# Patient Record
Sex: MALE | Race: WHITE | NOT HISPANIC OR LATINO
[De-identification: names, ages, dates, MRNs, and addresses within clinical notes are randomized per-mention and may not be internally consistent; named-entity substitution may affect disease eponyms.]

---

## 2017-01-23 ENCOUNTER — NON-APPOINTMENT (OUTPATIENT)
Age: 67
End: 2017-01-23

## 2017-01-23 ENCOUNTER — APPOINTMENT (OUTPATIENT)
Dept: CARDIOLOGY | Facility: CLINIC | Age: 67
End: 2017-01-23

## 2017-01-23 VITALS
SYSTOLIC BLOOD PRESSURE: 164 MMHG | WEIGHT: 225 LBS | BODY MASS INDEX: 31.5 KG/M2 | HEART RATE: 71 BPM | DIASTOLIC BLOOD PRESSURE: 93 MMHG | OXYGEN SATURATION: 97 % | HEIGHT: 71 IN

## 2017-01-24 ENCOUNTER — TRANSCRIPTION ENCOUNTER (OUTPATIENT)
Age: 67
End: 2017-01-24

## 2017-02-03 ENCOUNTER — APPOINTMENT (OUTPATIENT)
Dept: CARDIOLOGY | Facility: CLINIC | Age: 67
End: 2017-02-03

## 2017-02-03 VITALS
BODY MASS INDEX: 31.5 KG/M2 | DIASTOLIC BLOOD PRESSURE: 83 MMHG | OXYGEN SATURATION: 97 % | HEIGHT: 71 IN | HEART RATE: 77 BPM | WEIGHT: 225 LBS | SYSTOLIC BLOOD PRESSURE: 128 MMHG

## 2017-02-03 DIAGNOSIS — I35.1 NONRHEUMATIC AORTIC (VALVE) INSUFFICIENCY: ICD-10-CM

## 2017-02-03 RX ORDER — CALCIUM CARBONATE 260MG(650)
500 TABLET,CHEWABLE ORAL DAILY
Refills: 0 | Status: DISCONTINUED | COMMUNITY
End: 2017-02-03

## 2017-02-07 ENCOUNTER — FORM ENCOUNTER (OUTPATIENT)
Age: 67
End: 2017-02-07

## 2017-02-08 ENCOUNTER — OUTPATIENT (OUTPATIENT)
Dept: OUTPATIENT SERVICES | Facility: HOSPITAL | Age: 67
LOS: 1 days | End: 2017-02-08
Payer: MEDICARE

## 2017-02-08 ENCOUNTER — APPOINTMENT (OUTPATIENT)
Dept: CT IMAGING | Facility: CLINIC | Age: 67
End: 2017-02-08

## 2017-02-08 DIAGNOSIS — Z00.00 ENCOUNTER FOR GENERAL ADULT MEDICAL EXAMINATION WITHOUT ABNORMAL FINDINGS: ICD-10-CM

## 2017-02-08 DIAGNOSIS — Z98.89 OTHER SPECIFIED POSTPROCEDURAL STATES: Chronic | ICD-10-CM

## 2017-02-08 PROCEDURE — 82565 ASSAY OF CREATININE: CPT

## 2017-02-08 PROCEDURE — 71275 CT ANGIOGRAPHY CHEST: CPT

## 2017-02-24 ENCOUNTER — MEDICATION RENEWAL (OUTPATIENT)
Age: 67
End: 2017-02-24

## 2017-03-08 ENCOUNTER — APPOINTMENT (OUTPATIENT)
Dept: NEPHROLOGY | Facility: CLINIC | Age: 67
End: 2017-03-08

## 2017-03-08 VITALS
SYSTOLIC BLOOD PRESSURE: 150 MMHG | DIASTOLIC BLOOD PRESSURE: 86 MMHG | BODY MASS INDEX: 30.1 KG/M2 | HEIGHT: 71 IN | HEART RATE: 70 BPM | WEIGHT: 215 LBS

## 2017-03-08 RX ORDER — CEPHALEXIN 500 MG/1
500 CAPSULE ORAL
Qty: 12 | Refills: 0 | Status: DISCONTINUED | COMMUNITY
Start: 2017-01-25

## 2017-03-08 RX ORDER — OXYCODONE AND ACETAMINOPHEN 7.5; 325 MG/1; MG/1
7.5-325 TABLET ORAL
Qty: 25 | Refills: 0 | Status: DISCONTINUED | COMMUNITY
Start: 2017-01-25

## 2017-03-08 RX ORDER — HYDROCODONE BITARTRATE AND ACETAMINOPHEN 5; 325 MG/1; MG/1
5-325 TABLET ORAL
Qty: 20 | Refills: 0 | Status: DISCONTINUED | COMMUNITY
Start: 2017-01-16

## 2017-03-10 LAB
APPEARANCE: CLEAR
BACTERIA: NEGATIVE
BILIRUBIN URINE: NEGATIVE
BLOOD URINE: NEGATIVE
COLOR: YELLOW
CREAT SPEC-SCNC: 35 MG/DL
GLUCOSE QUALITATIVE U: 1000 MG/DL
KETONES URINE: NEGATIVE
LEUKOCYTE ESTERASE URINE: NEGATIVE
MICROALBUMIN 24H UR DL<=1MG/L-MCNC: 1.7 MG/DL
MICROALBUMIN/CREAT 24H UR-RTO: 49 UG/MG
MICROSCOPIC-UA: NORMAL
NITRITE URINE: NEGATIVE
PH URINE: 6.5
PROTEIN URINE: NEGATIVE MG/DL
RED BLOOD CELLS URINE: 1 /HPF
SPECIFIC GRAVITY URINE: 1.03
SQUAMOUS EPITHELIAL CELLS: 0 /HPF
UROBILINOGEN URINE: NORMAL MG/DL
WHITE BLOOD CELLS URINE: 1 /HPF

## 2017-03-15 DIAGNOSIS — N28.1 CYST OF KIDNEY, ACQUIRED: ICD-10-CM

## 2017-06-12 ENCOUNTER — NON-APPOINTMENT (OUTPATIENT)
Age: 67
End: 2017-06-12

## 2017-06-12 ENCOUNTER — APPOINTMENT (OUTPATIENT)
Dept: CARDIOLOGY | Facility: CLINIC | Age: 67
End: 2017-06-12

## 2017-06-12 VITALS
OXYGEN SATURATION: 94 % | HEART RATE: 75 BPM | HEIGHT: 71 IN | SYSTOLIC BLOOD PRESSURE: 142 MMHG | DIASTOLIC BLOOD PRESSURE: 82 MMHG | WEIGHT: 226 LBS | BODY MASS INDEX: 31.64 KG/M2

## 2017-06-16 LAB
24R-OH-CALCIDIOL SERPL-MCNC: 58.9 PG/ML
ALBUMIN SERPL ELPH-MCNC: 4.6 G/DL
ALP BLD-CCNC: 62 U/L
ALT SERPL-CCNC: 29 U/L
ANION GAP SERPL CALC-SCNC: 18 MMOL/L
AST SERPL-CCNC: 22 U/L
BASOPHILS # BLD AUTO: 0.05 K/UL
BASOPHILS NFR BLD AUTO: 0.8 %
BILIRUB SERPL-MCNC: 0.6 MG/DL
BUN SERPL-MCNC: 16 MG/DL
CALCIUM SERPL-MCNC: 9.7 MG/DL
CHLORIDE SERPL-SCNC: 102 MMOL/L
CHOLEST SERPL-MCNC: 184 MG/DL
CHOLEST/HDLC SERPL: 3.5 RATIO
CO2 SERPL-SCNC: 21 MMOL/L
CREAT SERPL-MCNC: 0.81 MG/DL
EOSINOPHIL # BLD AUTO: 0.2 K/UL
EOSINOPHIL NFR BLD AUTO: 3 %
GLUCOSE SERPL-MCNC: 133 MG/DL
HBA1C MFR BLD HPLC: 7.1 %
HCT VFR BLD CALC: 47.1 %
HDLC SERPL-MCNC: 52 MG/DL
HGB BLD-MCNC: 15.3 G/DL
IMM GRANULOCYTES NFR BLD AUTO: 0.3 %
LDLC SERPL CALC-MCNC: 115 MG/DL
LYMPHOCYTES # BLD AUTO: 2.36 K/UL
LYMPHOCYTES NFR BLD AUTO: 35.9 %
MAN DIFF?: NORMAL
MCHC RBC-ENTMCNC: 30.8 PG
MCHC RBC-ENTMCNC: 32.5 GM/DL
MCV RBC AUTO: 95 FL
MONOCYTES # BLD AUTO: 0.69 K/UL
MONOCYTES NFR BLD AUTO: 10.5 %
NEUTROPHILS # BLD AUTO: 3.25 K/UL
NEUTROPHILS NFR BLD AUTO: 49.5 %
PLATELET # BLD AUTO: 305 K/UL
POTASSIUM SERPL-SCNC: 4.6 MMOL/L
PROT SERPL-MCNC: 7.6 G/DL
RBC # BLD: 4.96 M/UL
RBC # FLD: 13.9 %
SODIUM SERPL-SCNC: 141 MMOL/L
TRIGL SERPL-MCNC: 85 MG/DL
TSH SERPL-ACNC: 1.55 UIU/ML
WBC # FLD AUTO: 6.57 K/UL

## 2017-06-19 ENCOUNTER — MEDICATION RENEWAL (OUTPATIENT)
Age: 67
End: 2017-06-19

## 2017-06-19 RX ORDER — CARVEDILOL 6.25 MG/1
6.25 TABLET, FILM COATED ORAL
Qty: 180 | Refills: 3 | Status: DISCONTINUED | COMMUNITY
End: 2017-06-19

## 2017-07-03 ENCOUNTER — MEDICATION RENEWAL (OUTPATIENT)
Age: 67
End: 2017-07-03

## 2017-07-03 DIAGNOSIS — R07.89 OTHER CHEST PAIN: ICD-10-CM

## 2017-07-07 ENCOUNTER — APPOINTMENT (OUTPATIENT)
Dept: CARDIOLOGY | Facility: CLINIC | Age: 67
End: 2017-07-07

## 2017-07-13 ENCOUNTER — APPOINTMENT (OUTPATIENT)
Dept: ELECTROPHYSIOLOGY | Facility: CLINIC | Age: 67
End: 2017-07-13

## 2017-07-13 VITALS — HEART RATE: 80 BPM | OXYGEN SATURATION: 96 % | DIASTOLIC BLOOD PRESSURE: 90 MMHG | SYSTOLIC BLOOD PRESSURE: 127 MMHG

## 2017-07-17 ENCOUNTER — NON-APPOINTMENT (OUTPATIENT)
Age: 67
End: 2017-07-17

## 2017-07-17 ENCOUNTER — APPOINTMENT (OUTPATIENT)
Dept: CARDIOLOGY | Facility: CLINIC | Age: 67
End: 2017-07-17

## 2017-07-17 VITALS
SYSTOLIC BLOOD PRESSURE: 131 MMHG | OXYGEN SATURATION: 95 % | DIASTOLIC BLOOD PRESSURE: 81 MMHG | BODY MASS INDEX: 43.05 KG/M2 | HEIGHT: 61 IN | HEART RATE: 81 BPM | WEIGHT: 228 LBS

## 2017-08-22 ENCOUNTER — OUTPATIENT (OUTPATIENT)
Dept: OUTPATIENT SERVICES | Facility: HOSPITAL | Age: 67
LOS: 1 days | Discharge: HOME | End: 2017-08-22
Payer: MEDICARE

## 2017-08-22 VITALS
DIASTOLIC BLOOD PRESSURE: 79 MMHG | OXYGEN SATURATION: 98 % | HEART RATE: 79 BPM | HEIGHT: 71 IN | TEMPERATURE: 98 F | SYSTOLIC BLOOD PRESSURE: 134 MMHG | WEIGHT: 237 LBS | RESPIRATION RATE: 20 BRPM

## 2017-08-22 DIAGNOSIS — Z98.890 OTHER SPECIFIED POSTPROCEDURAL STATES: Chronic | ICD-10-CM

## 2017-08-22 DIAGNOSIS — I47.1 SUPRAVENTRICULAR TACHYCARDIA: ICD-10-CM

## 2017-08-22 DIAGNOSIS — Z98.89 OTHER SPECIFIED POSTPROCEDURAL STATES: Chronic | ICD-10-CM

## 2017-08-22 DIAGNOSIS — Z01.818 ENCOUNTER FOR OTHER PREPROCEDURAL EXAMINATION: ICD-10-CM

## 2017-08-22 LAB
ANION GAP SERPL CALC-SCNC: 8 MMOL/L — SIGNIFICANT CHANGE UP (ref 5–17)
BASOPHILS # BLD AUTO: 0.1 K/UL — SIGNIFICANT CHANGE UP (ref 0–0.2)
BASOPHILS NFR BLD AUTO: 1.7 % — SIGNIFICANT CHANGE UP (ref 0–2)
BUN SERPL-MCNC: 13 MG/DL — SIGNIFICANT CHANGE UP (ref 7–23)
CALCIUM SERPL-MCNC: 9.4 MG/DL — SIGNIFICANT CHANGE UP (ref 8.5–10.1)
CHLORIDE SERPL-SCNC: 107 MMOL/L — SIGNIFICANT CHANGE UP (ref 96–108)
CO2 SERPL-SCNC: 27 MMOL/L — SIGNIFICANT CHANGE UP (ref 22–31)
CREAT SERPL-MCNC: 0.83 MG/DL — SIGNIFICANT CHANGE UP (ref 0.5–1.3)
EOSINOPHIL # BLD AUTO: 0.3 K/UL — SIGNIFICANT CHANGE UP (ref 0–0.5)
EOSINOPHIL NFR BLD AUTO: 4.7 % — SIGNIFICANT CHANGE UP (ref 0–6)
GLUCOSE SERPL-MCNC: 135 MG/DL — HIGH (ref 70–99)
HCT VFR BLD CALC: 46.9 % — SIGNIFICANT CHANGE UP (ref 39–50)
HGB BLD-MCNC: 16.3 G/DL — SIGNIFICANT CHANGE UP (ref 13–17)
LYMPHOCYTES # BLD AUTO: 2.3 K/UL — SIGNIFICANT CHANGE UP (ref 1–3.3)
LYMPHOCYTES # BLD AUTO: 31.8 % — SIGNIFICANT CHANGE UP (ref 13–44)
MCHC RBC-ENTMCNC: 32.5 PG — SIGNIFICANT CHANGE UP (ref 27–34)
MCHC RBC-ENTMCNC: 34.8 GM/DL — SIGNIFICANT CHANGE UP (ref 32–36)
MCV RBC AUTO: 93.4 FL — SIGNIFICANT CHANGE UP (ref 80–100)
MONOCYTES # BLD AUTO: 0.7 K/UL — SIGNIFICANT CHANGE UP (ref 0–0.9)
MONOCYTES NFR BLD AUTO: 9.5 % — SIGNIFICANT CHANGE UP (ref 2–14)
NEUTROPHILS # BLD AUTO: 3.8 K/UL — SIGNIFICANT CHANGE UP (ref 1.8–7.4)
NEUTROPHILS NFR BLD AUTO: 52.3 % — SIGNIFICANT CHANGE UP (ref 43–77)
PLATELET # BLD AUTO: 273 K/UL — SIGNIFICANT CHANGE UP (ref 150–400)
POTASSIUM SERPL-MCNC: 5.1 MMOL/L — SIGNIFICANT CHANGE UP (ref 3.5–5.3)
POTASSIUM SERPL-SCNC: 5.1 MMOL/L — SIGNIFICANT CHANGE UP (ref 3.5–5.3)
RBC # BLD: 5.02 M/UL — SIGNIFICANT CHANGE UP (ref 4.2–5.8)
RBC # FLD: 11.6 % — SIGNIFICANT CHANGE UP (ref 10.3–14.5)
SODIUM SERPL-SCNC: 142 MMOL/L — SIGNIFICANT CHANGE UP (ref 135–145)
WBC # BLD: 7.3 K/UL — SIGNIFICANT CHANGE UP (ref 3.8–10.5)
WBC # FLD AUTO: 7.3 K/UL — SIGNIFICANT CHANGE UP (ref 3.8–10.5)

## 2017-08-22 PROCEDURE — 93010 ELECTROCARDIOGRAM REPORT: CPT

## 2017-08-22 NOTE — H&P PST ADULT - ASSESSMENT
66 y/o male with SVT. Complain of lightheadedness and syncope. Scheduled for EPS with possible ablation with Dr. Lugo.    Plan  1. NPO at midnight.  2. Follow preop medication instructions from EP MD/NP  3. Use sponges as directed

## 2017-08-22 NOTE — H&P PST ADULT - PSH
H/O thyroidectomy  partial  History of knee replacement, total  2000, 2003  History of tonsillectomy  23 y/o  S/P wrist surgery  ORIF - 02/2017

## 2017-08-22 NOTE — H&P PST ADULT - PMH
BRBPR (bright red blood per rectum)    Colonic polyp    Diabetes mellitus    GI bleed    Hemorrhoids    HTN (hypertension)    Hypercholesteremia    OA (osteoarthritis)    Obesity    SVT (supraventricular tachycardia)

## 2017-08-22 NOTE — H&P PST ADULT - HISTORY OF PRESENT ILLNESS
66 y/o male with SVT. Complain of lightheadedness and syncope. Denies chest pain. Here today for PST for EPS with possible ablation.

## 2017-08-22 NOTE — H&P PST ADULT - TEMPERATURE IN CELSIUS (DEGREES C)
Appointments scheduled.  
Called pt left message to call office to schedule appointment.  
Called pt left message to call office to schedule appointments.  
Needs labs, mmg, dexa and physical scheduled for June.  
36.6

## 2017-08-22 NOTE — H&P PST ADULT - NSANTHTOTALSCORECAL_ENT_A_CORE
Spoke with Abdirizak Yin (cycle rep who rescheduled CT per patient request). Explained that ADALGISA Cedeño did not place new order for patient to have CT done. That his insurance did not find it medically necessary. An US was ordered instead and patient has since has this testing done. Cycle rep has spoken to patient and informed him of current situation. Understanding verbalized. CT canceled.    3

## 2017-08-22 NOTE — H&P PST ADULT - NSANTHOSAYNRD_GEN_A_CORE
No. SHELIA screening performed.  STOP BANG Legend: 0-2 = LOW Risk; 3-4 = INTERMEDIATE Risk; 5-8 = HIGH Risk

## 2017-08-24 ENCOUNTER — MEDICATION RENEWAL (OUTPATIENT)
Age: 67
End: 2017-08-24

## 2017-08-24 NOTE — ASU PATIENT PROFILE, ADULT - VISION (WITH CORRECTIVE LENSES IF THE PATIENT USUALLY WEARS THEM):
wears contacts/Partially impaired: cannot see medication labels or newsprint, but can see obstacles in path, and the surrounding layout; can count fingers at arm's length

## 2017-08-24 NOTE — ASU PATIENT PROFILE, ADULT - MEDICATIONS TO HOLD
Per NP instructions, pt to hold metoprolol 2 days prior to procedure; pt to hold diabetic medication in a.m. of procedure

## 2017-08-25 ENCOUNTER — MEDICATION RENEWAL (OUTPATIENT)
Age: 67
End: 2017-08-25

## 2017-08-28 ENCOUNTER — OUTPATIENT (OUTPATIENT)
Dept: OUTPATIENT SERVICES | Facility: HOSPITAL | Age: 67
LOS: 1 days | Discharge: HOME | End: 2017-08-28
Payer: MEDICARE

## 2017-08-28 VITALS
RESPIRATION RATE: 18 BRPM | SYSTOLIC BLOOD PRESSURE: 111 MMHG | HEART RATE: 70 BPM | DIASTOLIC BLOOD PRESSURE: 69 MMHG | WEIGHT: 229.94 LBS | HEIGHT: 71 IN | TEMPERATURE: 97 F | OXYGEN SATURATION: 98 %

## 2017-08-28 DIAGNOSIS — I47.1 SUPRAVENTRICULAR TACHYCARDIA: ICD-10-CM

## 2017-08-28 DIAGNOSIS — I77.819 AORTIC ECTASIA, UNSPECIFIED SITE: ICD-10-CM

## 2017-08-28 DIAGNOSIS — Z87.891 PERSONAL HISTORY OF NICOTINE DEPENDENCE: ICD-10-CM

## 2017-08-28 DIAGNOSIS — Z98.890 OTHER SPECIFIED POSTPROCEDURAL STATES: Chronic | ICD-10-CM

## 2017-08-28 DIAGNOSIS — N40.0 BENIGN PROSTATIC HYPERPLASIA WITHOUT LOWER URINARY TRACT SYMPTOMS: ICD-10-CM

## 2017-08-28 DIAGNOSIS — Z98.89 OTHER SPECIFIED POSTPROCEDURAL STATES: Chronic | ICD-10-CM

## 2017-08-28 DIAGNOSIS — E78.5 HYPERLIPIDEMIA, UNSPECIFIED: ICD-10-CM

## 2017-08-28 DIAGNOSIS — N28.1 CYST OF KIDNEY, ACQUIRED: ICD-10-CM

## 2017-08-28 DIAGNOSIS — I35.1 NONRHEUMATIC AORTIC (VALVE) INSUFFICIENCY: ICD-10-CM

## 2017-08-28 DIAGNOSIS — Z79.82 LONG TERM (CURRENT) USE OF ASPIRIN: ICD-10-CM

## 2017-08-28 DIAGNOSIS — E11.9 TYPE 2 DIABETES MELLITUS WITHOUT COMPLICATIONS: ICD-10-CM

## 2017-08-28 DIAGNOSIS — I10 ESSENTIAL (PRIMARY) HYPERTENSION: ICD-10-CM

## 2017-08-28 PROCEDURE — 93620 COMP EP EVL R AT VEN PAC&REC: CPT | Mod: 26

## 2017-08-28 PROCEDURE — 93613 INTRACARDIAC EPHYS 3D MAPG: CPT | Mod: 59

## 2017-08-28 RX ORDER — METOPROLOL TARTRATE 50 MG
50 TABLET ORAL DAILY
Qty: 0 | Refills: 0 | Status: DISCONTINUED | OUTPATIENT
Start: 2017-08-28 | End: 2017-08-28

## 2017-08-28 RX ORDER — ASPIRIN/CALCIUM CARB/MAGNESIUM 324 MG
81 TABLET ORAL DAILY
Qty: 0 | Refills: 0 | Status: DISCONTINUED | OUTPATIENT
Start: 2017-08-28 | End: 2017-08-29

## 2017-08-28 RX ORDER — ASPIRIN/CALCIUM CARB/MAGNESIUM 324 MG
81 TABLET ORAL DAILY
Qty: 0 | Refills: 0 | Status: DISCONTINUED | OUTPATIENT
Start: 2017-08-28 | End: 2017-08-28

## 2017-08-28 RX ORDER — METOPROLOL TARTRATE 50 MG
50 TABLET ORAL
Qty: 0 | Refills: 0 | Status: DISCONTINUED | OUTPATIENT
Start: 2017-08-28 | End: 2017-08-29

## 2017-08-28 RX ORDER — LISINOPRIL 2.5 MG/1
5 TABLET ORAL DAILY
Qty: 0 | Refills: 0 | Status: DISCONTINUED | OUTPATIENT
Start: 2017-08-28 | End: 2017-08-29

## 2017-08-28 RX ORDER — ISOPROTERENOL HYDROCHLORIDE 1 MG/5ML
1 INJECTION, SOLUTION INTRACARDIAC; INTRAMUSCULAR; INTRAVENOUS; SUBCUTANEOUS
Qty: 1 | Refills: 0 | Status: DISCONTINUED | OUTPATIENT
Start: 2017-08-28 | End: 2017-08-29

## 2017-08-28 RX ADMIN — Medication 50 MILLIGRAM(S): at 18:43

## 2017-08-28 RX ADMIN — LISINOPRIL 5 MILLIGRAM(S): 2.5 TABLET ORAL at 17:45

## 2017-08-28 RX ADMIN — Medication 81 MILLIGRAM(S): at 17:45

## 2017-08-29 ENCOUNTER — TRANSCRIPTION ENCOUNTER (OUTPATIENT)
Age: 67
End: 2017-08-29

## 2017-08-29 VITALS — SYSTOLIC BLOOD PRESSURE: 120 MMHG | DIASTOLIC BLOOD PRESSURE: 67 MMHG | HEART RATE: 71 BPM

## 2017-08-29 RX ORDER — RAMIPRIL 5 MG
1 CAPSULE ORAL
Qty: 0 | Refills: 0 | COMMUNITY

## 2017-08-29 RX ORDER — METOPROLOL TARTRATE 50 MG
1 TABLET ORAL
Qty: 0 | Refills: 0 | DISCHARGE
Start: 2017-08-29

## 2017-08-29 RX ORDER — LISINOPRIL 2.5 MG/1
1 TABLET ORAL
Qty: 0 | Refills: 0 | DISCHARGE
Start: 2017-08-29

## 2017-08-29 RX ORDER — METOPROLOL TARTRATE 50 MG
2 TABLET ORAL
Qty: 0 | Refills: 0 | DISCHARGE
Start: 2017-08-29

## 2017-08-29 RX ORDER — METOPROLOL TARTRATE 50 MG
1 TABLET ORAL
Qty: 0 | Refills: 0 | COMMUNITY

## 2017-08-29 RX ADMIN — LISINOPRIL 5 MILLIGRAM(S): 2.5 TABLET ORAL at 09:31

## 2017-08-29 RX ADMIN — Medication 81 MILLIGRAM(S): at 09:32

## 2017-08-29 RX ADMIN — Medication 50 MILLIGRAM(S): at 06:12

## 2017-08-29 NOTE — DISCHARGE NOTE ADULT - MEDICATION SUMMARY - MEDICATIONS TO STOP TAKING
I will STOP taking the medications listed below when I get home from the hospital:    ramipril 2.5 mg oral capsule  -- 1 cap(s) by mouth once a day (at bedtime)

## 2017-08-29 NOTE — DISCHARGE NOTE ADULT - HOSPITAL COURSE
Subjective: 67yMale with h/o SVT, who was non-inducible    EKG:  TELE: SR at 73 BPM    MEDICATIONS  (STANDING):  isoproterenol Infusion 1 MICROgram(s)/Min (15 mL/Hr) IV Continuous <Continuous>  lisinopril 5 milliGRAM(s) Oral daily  aspirin  chewable 81 milliGRAM(s) Oral daily  metoprolol succinate ER 50 milliGRAM(s) Oral two times a day    MEDICATIONS  (PRN):      Allergies    No Known Allergies    Intolerances        Vital Signs Last 24 Hrs  T(C): 36.1 (29 Aug 2017 06:20), Max: 36.6 (28 Aug 2017 17:45)  T(F): 96.9 (29 Aug 2017 06:20), Max: 97.8 (28 Aug 2017 17:45)  HR: 71 (29 Aug 2017 07:00) (62 - 87)  BP: 120/67 (29 Aug 2017 07:00) (94/46 - 144/88)  BP(mean): 100 (29 Aug 2017 06:11) (58 - 100)  RR: 18 (28 Aug 2017 16:30) (16 - 18)  SpO2: 94% (28 Aug 2017 20:30) (65% - 99%)    PHYSICAL EXAMINATION:    GENERAL APPEARANCE:  Pt. is not currently dyspneic, in no distress. Pt. is alert, oriented, and pleasant.  HEENT:  Pupils are normal and react normally. No icterus. Mucous membranes well colored.  NECK:  Supple. No lymphadenopathy. Jugular venous pressure not elevated. Carotids equal.   HEART:   The cardiac impulse has a normal quality. There are no murmurs, rubs or gallops noted  CHEST:  Chest is clear to auscultation. Normal respiratory effort.  ABDOMEN:  Soft and nontender.   EXTREMITIES:  There is no edema, pulses 2 plus.  No hematoma  SKIN:  No rash or significant lesions are noted.    LABS:                    RADIOLOGY & ADDITIONAL TESTS:    ASSESSMENT & PLAN: No Inducible arrhythmia  Medical therapy recommended  Pt to f/u with Dr. Lugo in 2 weeks

## 2017-08-29 NOTE — DISCHARGE NOTE ADULT - MEDICATION SUMMARY - MEDICATIONS TO TAKE
I will START or STAY ON the medications listed below when I get home from the hospital:    Low Dose ASA  -- 81 milligram(s) by mouth   -- Indication: For blood thinner    lisinopril 5 mg oral tablet  -- 1 tab(s) by mouth once a day  -- Indication: For blood pressure    Jentadueto 2.5 mg-1000 mg oral tablet  -- 1 tab(s) by mouth 2 times a day  -- Indication: For diabetic    Jardiance 25 mg oral tablet  -- 1 tab(s) by mouth once a day (in the morning)  -- Indication: For diabetic    metoprolol succinate 50 mg oral tablet, extended release  -- 1 tab(s) by mouth 2 times a day  -- Indication: For heart rate

## 2017-08-29 NOTE — DISCHARGE NOTE ADULT - CARE PLAN
Principal Discharge DX:	SVT (supraventricular tachycardia)  Goal:	Pt will be free from Tachycardia  Instructions for follow-up, activity and diet:	Diabtetic diet

## 2017-08-29 NOTE — DISCHARGE NOTE ADULT - PATIENT PORTAL LINK FT
“You can access the [facility] Patient Portal, offered by [facility], by registering with the following website: [email or web address]”

## 2017-08-30 ENCOUNTER — MEDICATION RENEWAL (OUTPATIENT)
Age: 67
End: 2017-08-30

## 2017-08-30 RX ORDER — METOPROLOL SUCCINATE 50 MG/1
50 TABLET, EXTENDED RELEASE ORAL
Qty: 90 | Refills: 1 | Status: COMPLETED | COMMUNITY
Start: 2017-06-19 | End: 2017-08-30

## 2017-09-21 ENCOUNTER — NON-APPOINTMENT (OUTPATIENT)
Age: 67
End: 2017-09-21

## 2017-09-21 ENCOUNTER — APPOINTMENT (OUTPATIENT)
Dept: ELECTROPHYSIOLOGY | Facility: CLINIC | Age: 67
End: 2017-09-21
Payer: MEDICARE

## 2017-09-21 VITALS
BODY MASS INDEX: 31.92 KG/M2 | WEIGHT: 228 LBS | HEIGHT: 71 IN | DIASTOLIC BLOOD PRESSURE: 94 MMHG | HEART RATE: 75 BPM | SYSTOLIC BLOOD PRESSURE: 137 MMHG

## 2017-09-21 PROCEDURE — 93000 ELECTROCARDIOGRAM COMPLETE: CPT

## 2017-09-21 PROCEDURE — 99215 OFFICE O/P EST HI 40 MIN: CPT

## 2017-09-21 RX ORDER — RAMIPRIL 2.5 MG/1
2.5 CAPSULE ORAL
Qty: 90 | Refills: 1 | Status: DISCONTINUED | COMMUNITY
End: 2017-09-21

## 2017-09-21 RX ORDER — METOPROLOL TARTRATE 50 MG/1
50 TABLET, FILM COATED ORAL TWICE DAILY
Qty: 60 | Refills: 5 | Status: COMPLETED | COMMUNITY
Start: 2017-08-30 | End: 2017-09-21

## 2017-09-21 RX ORDER — METOPROLOL TARTRATE 25 MG/1
25 TABLET, FILM COATED ORAL
Qty: 90 | Refills: 1 | Status: COMPLETED | COMMUNITY
End: 2017-09-21

## 2017-09-25 ENCOUNTER — APPOINTMENT (OUTPATIENT)
Dept: CARDIOLOGY | Facility: CLINIC | Age: 67
End: 2017-09-25
Payer: MEDICARE

## 2017-09-25 ENCOUNTER — NON-APPOINTMENT (OUTPATIENT)
Age: 67
End: 2017-09-25

## 2017-09-25 VITALS — HEART RATE: 66 BPM | DIASTOLIC BLOOD PRESSURE: 86 MMHG | OXYGEN SATURATION: 95 % | SYSTOLIC BLOOD PRESSURE: 126 MMHG

## 2017-09-25 VITALS — HEIGHT: 71 IN | BODY MASS INDEX: 32.76 KG/M2 | WEIGHT: 234 LBS

## 2017-09-25 PROCEDURE — 93000 ELECTROCARDIOGRAM COMPLETE: CPT

## 2017-09-25 PROCEDURE — 99214 OFFICE O/P EST MOD 30 MIN: CPT | Mod: 25

## 2017-12-06 ENCOUNTER — APPOINTMENT (OUTPATIENT)
Dept: ELECTROPHYSIOLOGY | Facility: CLINIC | Age: 67
End: 2017-12-06
Payer: MEDICARE

## 2017-12-06 VITALS — OXYGEN SATURATION: 98 % | SYSTOLIC BLOOD PRESSURE: 131 MMHG | HEART RATE: 64 BPM | DIASTOLIC BLOOD PRESSURE: 92 MMHG

## 2017-12-06 PROCEDURE — 93000 ELECTROCARDIOGRAM COMPLETE: CPT

## 2017-12-06 PROCEDURE — 99215 OFFICE O/P EST HI 40 MIN: CPT

## 2018-02-20 ENCOUNTER — MEDICATION RENEWAL (OUTPATIENT)
Age: 68
End: 2018-02-20

## 2018-07-30 ENCOUNTER — RX RENEWAL (OUTPATIENT)
Age: 68
End: 2018-07-30

## 2018-07-30 PROBLEM — E78.00 PURE HYPERCHOLESTEROLEMIA, UNSPECIFIED: Chronic | Status: ACTIVE | Noted: 2017-08-22

## 2018-07-30 PROBLEM — I10 ESSENTIAL (PRIMARY) HYPERTENSION: Chronic | Status: ACTIVE | Noted: 2017-08-22

## 2018-08-06 ENCOUNTER — APPOINTMENT (OUTPATIENT)
Dept: CARDIOLOGY | Facility: CLINIC | Age: 68
End: 2018-08-06
Payer: MEDICARE

## 2018-08-06 ENCOUNTER — NON-APPOINTMENT (OUTPATIENT)
Age: 68
End: 2018-08-06

## 2018-08-06 VITALS
DIASTOLIC BLOOD PRESSURE: 82 MMHG | HEIGHT: 71 IN | HEART RATE: 72 BPM | SYSTOLIC BLOOD PRESSURE: 130 MMHG | WEIGHT: 206 LBS | OXYGEN SATURATION: 97 % | BODY MASS INDEX: 28.84 KG/M2

## 2018-08-06 PROCEDURE — 93000 ELECTROCARDIOGRAM COMPLETE: CPT

## 2018-08-06 PROCEDURE — 99215 OFFICE O/P EST HI 40 MIN: CPT | Mod: 25

## 2018-08-06 RX ORDER — METHYLPREDNISOLONE 4 MG/1
4 TABLET ORAL
Refills: 0 | Status: DISCONTINUED | COMMUNITY
End: 2018-08-06

## 2018-08-06 RX ORDER — SILDENAFIL CITRATE 100 MG/1
100 TABLET, FILM COATED ORAL
Qty: 6 | Refills: 5 | Status: DISCONTINUED | COMMUNITY
Start: 2017-02-03 | End: 2018-08-06

## 2018-08-06 RX ORDER — METOPROLOL SUCCINATE 50 MG/1
50 TABLET, EXTENDED RELEASE ORAL TWICE DAILY
Refills: 0 | Status: DISCONTINUED | COMMUNITY
End: 2018-08-06

## 2018-08-07 ENCOUNTER — APPOINTMENT (OUTPATIENT)
Dept: CARDIOLOGY | Facility: CLINIC | Age: 68
End: 2018-08-07
Payer: MEDICARE

## 2018-08-07 PROCEDURE — 93306 TTE W/DOPPLER COMPLETE: CPT

## 2018-08-08 LAB
25(OH)D3 SERPL-MCNC: 75.6 NG/ML
ALBUMIN SERPL ELPH-MCNC: 5.2 G/DL
ALP BLD-CCNC: 50 U/L
ALT SERPL-CCNC: 18 U/L
ANION GAP SERPL CALC-SCNC: 14 MMOL/L
AST SERPL-CCNC: 16 U/L
BASOPHILS # BLD AUTO: 0.05 K/UL
BASOPHILS NFR BLD AUTO: 0.7 %
BILIRUB SERPL-MCNC: 0.5 MG/DL
BUN SERPL-MCNC: 19 MG/DL
CALCIUM SERPL-MCNC: 9.7 MG/DL
CHLORIDE SERPL-SCNC: 99 MMOL/L
CHOLEST SERPL-MCNC: 193 MG/DL
CHOLEST/HDLC SERPL: 3.4 RATIO
CO2 SERPL-SCNC: 27 MMOL/L
CREAT SERPL-MCNC: 0.72 MG/DL
EOSINOPHIL # BLD AUTO: 0.22 K/UL
EOSINOPHIL NFR BLD AUTO: 3.1 %
ERYTHROCYTE [SEDIMENTATION RATE] IN BLOOD BY WESTERGREN METHOD: 16 MM/HR
FOLATE SERPL-MCNC: 10.6 NG/ML
GLUCOSE SERPL-MCNC: 117 MG/DL
HBA1C MFR BLD HPLC: 6 %
HCT VFR BLD CALC: 45.3 %
HDLC SERPL-MCNC: 57 MG/DL
HGB BLD-MCNC: 15.1 G/DL
IMM GRANULOCYTES NFR BLD AUTO: 0.3 %
LDLC SERPL CALC-MCNC: 124 MG/DL
LYMPHOCYTES # BLD AUTO: 2.14 K/UL
LYMPHOCYTES NFR BLD AUTO: 30.3 %
MAN DIFF?: NORMAL
MCHC RBC-ENTMCNC: 32.8 PG
MCHC RBC-ENTMCNC: 33.3 GM/DL
MCV RBC AUTO: 98.3 FL
MONOCYTES # BLD AUTO: 0.64 K/UL
MONOCYTES NFR BLD AUTO: 9.1 %
NEUTROPHILS # BLD AUTO: 4 K/UL
NEUTROPHILS NFR BLD AUTO: 56.5 %
PLATELET # BLD AUTO: 324 K/UL
POTASSIUM SERPL-SCNC: 5.1 MMOL/L
PROT SERPL-MCNC: 7.4 G/DL
PSA SERPL-MCNC: 2.85 NG/ML
RBC # BLD: 4.61 M/UL
RBC # FLD: 13.6 %
SODIUM SERPL-SCNC: 140 MMOL/L
TRIGL SERPL-MCNC: 61 MG/DL
TSH SERPL-ACNC: 1.73 UIU/ML
VIT B12 SERPL-MCNC: 778 PG/ML
WBC # FLD AUTO: 7.07 K/UL

## 2018-08-10 ENCOUNTER — APPOINTMENT (OUTPATIENT)
Dept: CARDIOLOGY | Facility: CLINIC | Age: 68
End: 2018-08-10

## 2018-08-13 RX ORDER — SILDENAFIL 100 MG/1
100 TABLET, FILM COATED ORAL
Qty: 30 | Refills: 0 | Status: DISCONTINUED | COMMUNITY
Start: 2018-08-09 | End: 2018-08-13

## 2018-08-28 ENCOUNTER — RX RENEWAL (OUTPATIENT)
Age: 68
End: 2018-08-28

## 2018-12-17 ENCOUNTER — APPOINTMENT (OUTPATIENT)
Dept: ELECTROPHYSIOLOGY | Facility: CLINIC | Age: 68
End: 2018-12-17
Payer: MEDICARE

## 2018-12-17 VITALS
SYSTOLIC BLOOD PRESSURE: 137 MMHG | OXYGEN SATURATION: 100 % | HEART RATE: 64 BPM | TEMPERATURE: 98.1 F | DIASTOLIC BLOOD PRESSURE: 88 MMHG

## 2018-12-17 DIAGNOSIS — E11.9 TYPE 2 DIABETES MELLITUS W/OUT COMPLICATIONS: ICD-10-CM

## 2018-12-17 PROCEDURE — 93000 ELECTROCARDIOGRAM COMPLETE: CPT

## 2018-12-17 PROCEDURE — 99215 OFFICE O/P EST HI 40 MIN: CPT

## 2018-12-17 RX ORDER — LINAGLIPTIN AND METFORMIN HYDROCHLORIDE 2.5; 1 MG/1; MG/1
2.5-1 TABLET, FILM COATED ORAL TWICE DAILY
Refills: 0 | Status: COMPLETED | COMMUNITY
End: 2018-12-17

## 2018-12-17 RX ORDER — EMPAGLIFLOZIN 25 MG/1
25 TABLET, FILM COATED ORAL DAILY
Refills: 0 | Status: COMPLETED | COMMUNITY
End: 2018-12-17

## 2018-12-17 RX ORDER — ELECTROLYTES/DEXTROSE
200 SOLUTION, ORAL ORAL DAILY
Refills: 0 | Status: COMPLETED | COMMUNITY
End: 2018-12-17

## 2018-12-17 RX ORDER — OMEGA-3/DHA/EPA/FISH OIL 300-1000MG
CAPSULE ORAL
Refills: 0 | Status: COMPLETED | COMMUNITY
End: 2018-12-17

## 2018-12-17 NOTE — CARDIOLOGY SUMMARY
[___] : [unfilled] [LVEF ___%] : LVEF [unfilled]% [None] : no pulmonary hypertension [Enlarged] : enlarged LA size [Mild] : mild mitral regurgitation

## 2018-12-17 NOTE — ASSESSMENT
[FreeTextEntry1] : This is a 68 -year-old gentleman with episodes of tachycardia now asymptomatic.\par Will continue medical therapy.   Discussed option of left sided catheter ablation.

## 2018-12-17 NOTE — PHYSICAL EXAM
[General Appearance - Well Developed] : well developed [Normal Appearance] : normal appearance [Well Groomed] : well groomed [General Appearance - Well Nourished] : well nourished [No Deformities] : no deformities [General Appearance - In No Acute Distress] : no acute distress [Normal Conjunctiva] : the conjunctiva exhibited no abnormalities [Eyelids - No Xanthelasma] : the eyelids demonstrated no xanthelasmas [Normal Oral Mucosa] : normal oral mucosa [No Oral Pallor] : no oral pallor [No Oral Cyanosis] : no oral cyanosis [Normal Jugular Venous A Waves Present] : normal jugular venous A waves present [Normal Jugular Venous V Waves Present] : normal jugular venous V waves present [No Jugular Venous Fitzgerald A Waves] : no jugular venous fitzgerald A waves [Respiration, Rhythm And Depth] : normal respiratory rhythm and effort [Exaggerated Use Of Accessory Muscles For Inspiration] : no accessory muscle use [Auscultation Breath Sounds / Voice Sounds] : lungs were clear to auscultation bilaterally [Abdomen Soft] : soft [Abdomen Tenderness] : non-tender [Abdomen Mass (___ Cm)] : no abdominal mass palpated [Abnormal Walk] : normal gait [Gait - Sufficient For Exercise Testing] : the gait was sufficient for exercise testing [Nail Clubbing] : no clubbing of the fingernails [Cyanosis, Localized] : no localized cyanosis [Petechial Hemorrhages (___cm)] : no petechial hemorrhages [Skin Color & Pigmentation] : normal skin color and pigmentation [] : no rash [No Venous Stasis] : no venous stasis [Skin Lesions] : no skin lesions [No Skin Ulcers] : no skin ulcer [No Xanthoma] : no  xanthoma was observed [Oriented To Time, Place, And Person] : oriented to person, place, and time [Affect] : the affect was normal [Mood] : the mood was normal [No Anxiety] : not feeling anxious [Normal Rate] : normal [Normal S1] : normal S1 [Normal S2] : normal S2 [S3] : no S3 [S4] : no S4 [No Murmur] : no murmurs heard [Right Carotid Bruit] : no bruit heard over the right carotid [Left Carotid Bruit] : no bruit heard over the left carotid [Right Femoral Bruit] : no bruit heard over the right femoral artery [Left Femoral Bruit] : no bruit heard over the left femoral artery [2+] : left 2+ [No Abnormalities] : the abdominal aorta was not enlarged and no bruit was heard [No Pitting Edema] : no pitting edema present

## 2018-12-17 NOTE — HISTORY OF PRESENT ILLNESS
[FreeTextEntry1] : This is a 68-year-old man, who presents for evaluation after recurrent episodes of syncope. He underwent a telemetry monitoring that demonstrated episodes of supraventricular tachycardia. YOKASTA MCCLELLAND  denies chest pain, chest pressure, shortness of breath, lightheadedness, dizziness, palpitations, orthopnea, PND, or edema.  He underwent an EP study that demonstrated midline conduction without dual AV nodes or AP. He had atrial tachycardia induced that appears to be left sided by pwave morphology.  He was admitted earlier this year with episode of near syncope. He had left his metoprolol out of his medication box for nearly a week.

## 2019-04-19 ENCOUNTER — TRANSCRIPTION ENCOUNTER (OUTPATIENT)
Age: 69
End: 2019-04-19

## 2019-08-02 ENCOUNTER — RX RENEWAL (OUTPATIENT)
Age: 69
End: 2019-08-02

## 2019-08-26 ENCOUNTER — MED ADMIN CHARGE (OUTPATIENT)
Age: 69
End: 2019-08-26

## 2019-08-26 ENCOUNTER — RX RENEWAL (OUTPATIENT)
Age: 69
End: 2019-08-26

## 2019-09-05 ENCOUNTER — RX RENEWAL (OUTPATIENT)
Age: 69
End: 2019-09-05

## 2019-09-09 ENCOUNTER — APPOINTMENT (OUTPATIENT)
Dept: INTERNAL MEDICINE | Facility: CLINIC | Age: 69
End: 2019-09-09
Payer: MEDICARE

## 2019-09-09 VITALS
OXYGEN SATURATION: 97 % | SYSTOLIC BLOOD PRESSURE: 161 MMHG | BODY MASS INDEX: 30.94 KG/M2 | RESPIRATION RATE: 14 BRPM | HEART RATE: 66 BPM | TEMPERATURE: 98 F | HEIGHT: 71 IN | WEIGHT: 221 LBS | DIASTOLIC BLOOD PRESSURE: 90 MMHG

## 2019-09-09 VITALS — DIASTOLIC BLOOD PRESSURE: 82 MMHG | SYSTOLIC BLOOD PRESSURE: 148 MMHG

## 2019-09-09 PROCEDURE — 99214 OFFICE O/P EST MOD 30 MIN: CPT

## 2019-09-09 NOTE — REVIEW OF SYSTEMS
[Back Pain] : back pain [Negative] : Cardiovascular [Shortness Of Breath] : no shortness of breath [Wheezing] : no wheezing [Cough] : no cough [Abdominal Pain] : no abdominal pain [Nausea] : no nausea [FreeTextEntry5] : sees Dr Link cardio [FreeTextEntry9] : getting epidural steroid injections

## 2019-09-09 NOTE — PHYSICAL EXAM
[No Acute Distress] : no acute distress [Normal Sclera/Conjunctiva] : normal sclera/conjunctiva [No JVD] : no jugular venous distention [Normal Outer Ear/Nose] : the outer ears and nose were normal in appearance [No Lymphadenopathy] : no lymphadenopathy [No Respiratory Distress] : no respiratory distress  [Clear to Auscultation] : lungs were clear to auscultation bilaterally [No Accessory Muscle Use] : no accessory muscle use [Normal Rate] : normal rate  [Regular Rhythm] : with a regular rhythm [No Extremity Clubbing/Cyanosis] : no extremity clubbing/cyanosis [No Edema] : there was no peripheral edema [Soft] : abdomen soft [Non Tender] : non-tender [Non-distended] : non-distended [No Masses] : no abdominal mass palpated [Normal Posterior Cervical Nodes] : no posterior cervical lymphadenopathy [Normal Anterior Cervical Nodes] : no anterior cervical lymphadenopathy

## 2019-09-09 NOTE — HISTORY OF PRESENT ILLNESS
[FreeTextEntry8] : Pt comes in need of vascular surgeon as vascular calcifications were seen on aorto when he had scans for sciatica.  Pt had been off DM meds and chol meds due to having lost weight but he put weight back on and last hgba1c was 8.6

## 2019-09-30 ENCOUNTER — APPOINTMENT (OUTPATIENT)
Dept: CARDIOLOGY | Facility: CLINIC | Age: 69
End: 2019-09-30
Payer: MEDICARE

## 2019-09-30 ENCOUNTER — RX RENEWAL (OUTPATIENT)
Age: 69
End: 2019-09-30

## 2019-09-30 ENCOUNTER — MEDICATION RENEWAL (OUTPATIENT)
Age: 69
End: 2019-09-30

## 2019-09-30 PROCEDURE — 93000 ELECTROCARDIOGRAM COMPLETE: CPT

## 2019-09-30 PROCEDURE — 99214 OFFICE O/P EST MOD 30 MIN: CPT | Mod: 25

## 2019-09-30 NOTE — PHYSICAL EXAM
[General Appearance - Well Developed] : well developed [Normal Appearance] : normal appearance [Well Groomed] : well groomed [General Appearance - Well Nourished] : well nourished [No Deformities] : no deformities [General Appearance - In No Acute Distress] : no acute distress [Eyelids - No Xanthelasma] : the eyelids demonstrated no xanthelasmas [Normal Conjunctiva] : the conjunctiva exhibited no abnormalities [Normal Oral Mucosa] : normal oral mucosa [No Oral Pallor] : no oral pallor [No Oral Cyanosis] : no oral cyanosis [Normal Jugular Venous V Waves Present] : normal jugular venous V waves present [Normal Jugular Venous A Waves Present] : normal jugular venous A waves present [No Jugular Venous Fitzgerald A Waves] : no jugular venous fitzgerald A waves [Respiration, Rhythm And Depth] : normal respiratory rhythm and effort [Exaggerated Use Of Accessory Muscles For Inspiration] : no accessory muscle use [Auscultation Breath Sounds / Voice Sounds] : lungs were clear to auscultation bilaterally [Abdomen Tenderness] : non-tender [Abdomen Soft] : soft [Abdomen Mass (___ Cm)] : no abdominal mass palpated [Abnormal Walk] : normal gait [Nail Clubbing] : no clubbing of the fingernails [Gait - Sufficient For Exercise Testing] : the gait was sufficient for exercise testing [Cyanosis, Localized] : no localized cyanosis [Petechial Hemorrhages (___cm)] : no petechial hemorrhages [Skin Color & Pigmentation] : normal skin color and pigmentation [No Venous Stasis] : no venous stasis [] : no rash [No Skin Ulcers] : no skin ulcer [Skin Lesions] : no skin lesions [No Xanthoma] : no  xanthoma was observed [Affect] : the affect was normal [Oriented To Time, Place, And Person] : oriented to person, place, and time [Mood] : the mood was normal [No Anxiety] : not feeling anxious [Normal Rate] : normal [Normal S1] : normal S1 [Normal S2] : normal S2 [S3] : no S3 [S4] : no S4 [No Murmur] : no murmurs heard [Right Carotid Bruit] : no bruit heard over the right carotid [Left Carotid Bruit] : no bruit heard over the left carotid [Right Femoral Bruit] : no bruit heard over the right femoral artery [Left Femoral Bruit] : no bruit heard over the left femoral artery [2+] : left 2+ [No Abnormalities] : the abdominal aorta was not enlarged and no bruit was heard [No Pitting Edema] : no pitting edema present

## 2019-09-30 NOTE — HISTORY OF PRESENT ILLNESS
[FreeTextEntry1] : This is a 69-year-old man, who presents for evaluation after recurrent episodes of syncope. He underwent a telemetry monitoring that demonstrated episodes of supraventricular tachycardia. YOKASTA STAS  denies chest pain, chest pressure, shortness of breath. He however reports an episode of palpitaitons with lightheadedness. He reports compliance with his meds and reduction of alcohol intake.\par \par .  He underwent an EP study that demonstrated midline conduction without dual AV nodes or AP. He had atrial tachycardia induced that appears to be left sided by pwave morphology.

## 2019-09-30 NOTE — ASSESSMENT
[FreeTextEntry1] : This is a 69 -year-old gentleman with episodes of tachycardia now with episode of palpitations and near syncope. Will discuss with EP for possible ablation.

## 2019-11-15 ENCOUNTER — MEDICATION RENEWAL (OUTPATIENT)
Age: 69
End: 2019-11-15

## 2019-11-16 ENCOUNTER — RX CHANGE (OUTPATIENT)
Age: 69
End: 2019-11-16

## 2019-11-16 ENCOUNTER — TRANSCRIPTION ENCOUNTER (OUTPATIENT)
Age: 69
End: 2019-11-16

## 2019-12-18 ENCOUNTER — NON-APPOINTMENT (OUTPATIENT)
Age: 69
End: 2019-12-18

## 2019-12-18 ENCOUNTER — APPOINTMENT (OUTPATIENT)
Dept: ELECTROPHYSIOLOGY | Facility: CLINIC | Age: 69
End: 2019-12-18
Payer: MEDICARE

## 2019-12-18 VITALS
DIASTOLIC BLOOD PRESSURE: 80 MMHG | HEART RATE: 63 BPM | OXYGEN SATURATION: 99 % | SYSTOLIC BLOOD PRESSURE: 118 MMHG | HEIGHT: 71 IN | WEIGHT: 212 LBS | BODY MASS INDEX: 29.68 KG/M2

## 2019-12-18 PROCEDURE — 99215 OFFICE O/P EST HI 40 MIN: CPT

## 2019-12-18 PROCEDURE — 93000 ELECTROCARDIOGRAM COMPLETE: CPT

## 2019-12-18 RX ORDER — TAMSULOSIN HYDROCHLORIDE 0.4 MG/1
0.4 CAPSULE ORAL
Qty: 30 | Refills: 3 | Status: COMPLETED | COMMUNITY
Start: 2019-09-09 | End: 2019-12-18

## 2019-12-18 RX ORDER — OXYCODONE AND ACETAMINOPHEN 5; 325 MG/1; MG/1
5-325 TABLET ORAL
Qty: 20 | Refills: 0 | Status: COMPLETED | COMMUNITY
Start: 2019-09-09 | End: 2019-12-18

## 2019-12-18 RX ORDER — METOPROLOL SUCCINATE 100 MG/1
100 TABLET, EXTENDED RELEASE ORAL
Qty: 135 | Refills: 3 | Status: COMPLETED | COMMUNITY
Start: 2019-08-02 | End: 2019-12-18

## 2019-12-18 RX ORDER — PRAVASTATIN SODIUM 20 MG/1
20 TABLET ORAL DAILY
Refills: 0 | Status: COMPLETED | COMMUNITY
Start: 2019-09-09 | End: 2019-12-18

## 2019-12-18 RX ORDER — FINASTERIDE 5 MG/1
5 TABLET, FILM COATED ORAL DAILY
Qty: 90 | Refills: 0 | Status: COMPLETED | COMMUNITY
Start: 2019-09-09 | End: 2019-12-18

## 2019-12-26 ENCOUNTER — MEDICATION RENEWAL (OUTPATIENT)
Age: 69
End: 2019-12-26

## 2019-12-30 NOTE — HISTORY OF PRESENT ILLNESS
[FreeTextEntry1] : This is a 69-year-old man, who presents for evaluation after recurrent episodes of syncope. He underwent a telemetry monitoring that demonstrated episodes of supraventricular tachycardia. YOKASTA STAS  denies chest pain, chest pressure, shortness of breath, lightheadedness, dizziness, palpitations, orthopnea, PND, or edema.  He underwent an EP study that demonstrated midline conduction without dual AV nodes or AP. He had atrial tachycardia induced that appears to be left sided by p wave morphology.  \par \par He has had episodes of palpitations.

## 2019-12-30 NOTE — CARDIOLOGY SUMMARY
[___] : [unfilled] [LVEF ___%] : LVEF [unfilled]% [None] : no pulmonary hypertension [Mild] : mild mitral regurgitation [Enlarged] : enlarged LA size

## 2019-12-30 NOTE — ASSESSMENT
[FreeTextEntry1] : This is a 69 -year-old gentleman with episodes of tachycardia\par Will continue medical therapy.   Discussed option of left sided catheter ablation and Implantzble loop recorder to evaluate arrhythmia burden and determine etiology of prior syncope episodes.

## 2019-12-30 NOTE — PHYSICAL EXAM
[General Appearance - Well Developed] : well developed [Normal Appearance] : normal appearance [No Deformities] : no deformities [General Appearance - Well Nourished] : well nourished [Well Groomed] : well groomed [Normal Conjunctiva] : the conjunctiva exhibited no abnormalities [General Appearance - In No Acute Distress] : no acute distress [Eyelids - No Xanthelasma] : the eyelids demonstrated no xanthelasmas [Normal Oral Mucosa] : normal oral mucosa [No Oral Pallor] : no oral pallor [No Oral Cyanosis] : no oral cyanosis [Normal Jugular Venous A Waves Present] : normal jugular venous A waves present [Normal Jugular Venous V Waves Present] : normal jugular venous V waves present [No Jugular Venous Fitzgerald A Waves] : no jugular venous fitzgerald A waves [Respiration, Rhythm And Depth] : normal respiratory rhythm and effort [Exaggerated Use Of Accessory Muscles For Inspiration] : no accessory muscle use [Auscultation Breath Sounds / Voice Sounds] : lungs were clear to auscultation bilaterally [Abdomen Soft] : soft [Abdomen Tenderness] : non-tender [Abnormal Walk] : normal gait [Gait - Sufficient For Exercise Testing] : the gait was sufficient for exercise testing [Abdomen Mass (___ Cm)] : no abdominal mass palpated [Nail Clubbing] : no clubbing of the fingernails [Cyanosis, Localized] : no localized cyanosis [Petechial Hemorrhages (___cm)] : no petechial hemorrhages [Skin Color & Pigmentation] : normal skin color and pigmentation [] : no rash [No Venous Stasis] : no venous stasis [Skin Lesions] : no skin lesions [No Skin Ulcers] : no skin ulcer [No Xanthoma] : no  xanthoma was observed [Mood] : the mood was normal [Affect] : the affect was normal [Oriented To Time, Place, And Person] : oriented to person, place, and time [No Anxiety] : not feeling anxious [Normal Rate] : normal [Normal S1] : normal S1 [Normal S2] : normal S2 [S3] : no S3 [S4] : no S4 [No Murmur] : no murmurs heard [Right Carotid Bruit] : no bruit heard over the right carotid [Left Carotid Bruit] : no bruit heard over the left carotid [Left Femoral Bruit] : no bruit heard over the left femoral artery [Right Femoral Bruit] : no bruit heard over the right femoral artery [2+] : left 2+ [No Pitting Edema] : no pitting edema present [No Abnormalities] : the abdominal aorta was not enlarged and no bruit was heard

## 2019-12-31 ENCOUNTER — OUTPATIENT (OUTPATIENT)
Dept: OUTPATIENT SERVICES | Facility: HOSPITAL | Age: 69
LOS: 1 days | Discharge: HOME | End: 2019-12-31
Payer: MEDICARE

## 2019-12-31 VITALS
TEMPERATURE: 98 F | HEART RATE: 54 BPM | OXYGEN SATURATION: 95 % | DIASTOLIC BLOOD PRESSURE: 77 MMHG | RESPIRATION RATE: 18 BRPM | SYSTOLIC BLOOD PRESSURE: 146 MMHG

## 2019-12-31 VITALS
HEIGHT: 71 IN | OXYGEN SATURATION: 95 % | WEIGHT: 212.53 LBS | SYSTOLIC BLOOD PRESSURE: 132 MMHG | TEMPERATURE: 98 F | HEART RATE: 57 BPM | DIASTOLIC BLOOD PRESSURE: 84 MMHG | RESPIRATION RATE: 20 BRPM

## 2019-12-31 DIAGNOSIS — R55 SYNCOPE AND COLLAPSE: ICD-10-CM

## 2019-12-31 DIAGNOSIS — I47.1 SUPRAVENTRICULAR TACHYCARDIA: ICD-10-CM

## 2019-12-31 DIAGNOSIS — Z98.89 OTHER SPECIFIED POSTPROCEDURAL STATES: Chronic | ICD-10-CM

## 2019-12-31 DIAGNOSIS — Z98.890 OTHER SPECIFIED POSTPROCEDURAL STATES: Chronic | ICD-10-CM

## 2019-12-31 PROCEDURE — 33286 RMVL SUBQ CAR RHYTHM MNTR: CPT

## 2019-12-31 PROCEDURE — C1764: CPT

## 2019-12-31 PROCEDURE — 33285 INSJ SUBQ CAR RHYTHM MNTR: CPT

## 2019-12-31 RX ORDER — EMPAGLIFLOZIN 10 MG/1
1 TABLET, FILM COATED ORAL
Qty: 0 | Refills: 0 | DISCHARGE

## 2019-12-31 RX ORDER — LINAGLIPTIN AND METFORMIN HYDROCHLORIDE 2.5; 85 MG/1; MG/1
1 TABLET, FILM COATED ORAL
Qty: 0 | Refills: 0 | DISCHARGE

## 2019-12-31 NOTE — BEDSIDE PROCEDURE TIME OUT CHECKLIST - NSPOSTCOMMENTSFT_GEN_ALL_CORE
transferred to EP lAB 1 @ Prepped and draped per protocol. Brief and time- out accomplished with all team members present. 1024. Transferred to EP lAB 1 @1024.  Prepped and draped per protocol. Brief and time- out accomplished with all team members present. 7968. 1036: Procedure completed. Pt tolerates w/o complications. Pain free.

## 2019-12-31 NOTE — PACU DISCHARGE NOTE - COMMENTS
Discharged home with family. Pain free. Discharged with written and verbal instructions.  Dressing dry and intact.

## 2020-01-01 DIAGNOSIS — I47.1 SUPRAVENTRICULAR TACHYCARDIA: ICD-10-CM

## 2020-01-01 DIAGNOSIS — R55 SYNCOPE AND COLLAPSE: ICD-10-CM

## 2020-01-01 DIAGNOSIS — E11.9 TYPE 2 DIABETES MELLITUS WITHOUT COMPLICATIONS: ICD-10-CM

## 2020-01-01 DIAGNOSIS — I10 ESSENTIAL (PRIMARY) HYPERTENSION: ICD-10-CM

## 2020-01-01 DIAGNOSIS — Z82.49 FAMILY HISTORY OF ISCHEMIC HEART DISEASE AND OTHER DISEASES OF THE CIRCULATORY SYSTEM: ICD-10-CM

## 2020-01-06 ENCOUNTER — NON-APPOINTMENT (OUTPATIENT)
Age: 70
End: 2020-01-06

## 2020-01-06 ENCOUNTER — APPOINTMENT (OUTPATIENT)
Dept: CARDIOLOGY | Facility: CLINIC | Age: 70
End: 2020-01-06
Payer: MEDICARE

## 2020-01-06 VITALS
OXYGEN SATURATION: 95 % | DIASTOLIC BLOOD PRESSURE: 83 MMHG | SYSTOLIC BLOOD PRESSURE: 143 MMHG | HEART RATE: 56 BPM | HEIGHT: 71 IN | BODY MASS INDEX: 30.8 KG/M2 | WEIGHT: 220 LBS

## 2020-01-06 PROCEDURE — 93000 ELECTROCARDIOGRAM COMPLETE: CPT

## 2020-01-06 PROCEDURE — 99214 OFFICE O/P EST MOD 30 MIN: CPT

## 2020-01-06 NOTE — ASSESSMENT
[FreeTextEntry1] : Dilated AO : repeat echo cardiogram to assess for interval change. \par Discussed possible orthostatic hypotension due to Finasteride, advised to change position slowly, increase fluid intake. \par OV in 6 months.

## 2020-01-06 NOTE — PHYSICAL EXAM
[General Appearance - Well Developed] : well developed [Normal Appearance] : normal appearance [Well Groomed] : well groomed [General Appearance - Well Nourished] : well nourished [No Deformities] : no deformities [General Appearance - In No Acute Distress] : no acute distress [Normal Conjunctiva] : the conjunctiva exhibited no abnormalities [Eyelids - No Xanthelasma] : the eyelids demonstrated no xanthelasmas [Normal Oral Mucosa] : normal oral mucosa [No Oral Pallor] : no oral pallor [No Oral Cyanosis] : no oral cyanosis [Normal Jugular Venous A Waves Present] : normal jugular venous A waves present [Normal Jugular Venous V Waves Present] : normal jugular venous V waves present [No Jugular Venous Fitzgerald A Waves] : no jugular venous fitzgerald A waves [Respiration, Rhythm And Depth] : normal respiratory rhythm and effort [Exaggerated Use Of Accessory Muscles For Inspiration] : no accessory muscle use [Auscultation Breath Sounds / Voice Sounds] : lungs were clear to auscultation bilaterally [Abdomen Tenderness] : non-tender [Abdomen Soft] : soft [Abdomen Mass (___ Cm)] : no abdominal mass palpated [Abnormal Walk] : normal gait [Gait - Sufficient For Exercise Testing] : the gait was sufficient for exercise testing [Nail Clubbing] : no clubbing of the fingernails [Cyanosis, Localized] : no localized cyanosis [Petechial Hemorrhages (___cm)] : no petechial hemorrhages [Skin Color & Pigmentation] : normal skin color and pigmentation [] : no rash [No Venous Stasis] : no venous stasis [Skin Lesions] : no skin lesions [No Skin Ulcers] : no skin ulcer [No Xanthoma] : no  xanthoma was observed [Oriented To Time, Place, And Person] : oriented to person, place, and time [Affect] : the affect was normal [Mood] : the mood was normal [Normal Rate] : normal [No Anxiety] : not feeling anxious [Normal S1] : normal S1 [Normal S2] : normal S2 [No Murmur] : no murmurs heard [2+] : left 2+ [No Abnormalities] : the abdominal aorta was not enlarged and no bruit was heard [No Pitting Edema] : no pitting edema present [S3] : no S3 [S4] : no S4 [Right Carotid Bruit] : no bruit heard over the right carotid [Right Femoral Bruit] : no bruit heard over the right femoral artery [Left Carotid Bruit] : no bruit heard over the left carotid [Left Femoral Bruit] : no bruit heard over the left femoral artery

## 2020-01-15 RX ORDER — FINASTERIDE 5 MG/1
5 TABLET, FILM COATED ORAL DAILY
Refills: 1 | Status: ACTIVE | COMMUNITY

## 2020-01-16 ENCOUNTER — APPOINTMENT (OUTPATIENT)
Dept: ELECTROPHYSIOLOGY | Facility: CLINIC | Age: 70
End: 2020-01-16
Payer: MEDICARE

## 2020-01-16 VITALS
WEIGHT: 218 LBS | SYSTOLIC BLOOD PRESSURE: 148 MMHG | HEIGHT: 71 IN | BODY MASS INDEX: 30.52 KG/M2 | DIASTOLIC BLOOD PRESSURE: 91 MMHG | HEART RATE: 61 BPM | OXYGEN SATURATION: 97 %

## 2020-01-16 PROCEDURE — 93285 PRGRMG DEV EVAL SCRMS IP: CPT

## 2020-01-16 PROCEDURE — 99024 POSTOP FOLLOW-UP VISIT: CPT

## 2020-02-18 ENCOUNTER — APPOINTMENT (OUTPATIENT)
Dept: CARDIOLOGY | Facility: CLINIC | Age: 70
End: 2020-02-18

## 2020-02-18 ENCOUNTER — APPOINTMENT (OUTPATIENT)
Dept: ELECTROPHYSIOLOGY | Facility: CLINIC | Age: 70
End: 2020-02-18
Payer: MEDICARE

## 2020-02-18 PROCEDURE — 93298 REM INTERROG DEV EVAL SCRMS: CPT

## 2020-02-18 PROCEDURE — G2066: CPT

## 2020-03-04 ENCOUNTER — OUTPATIENT (OUTPATIENT)
Dept: OUTPATIENT SERVICES | Facility: HOSPITAL | Age: 70
LOS: 1 days | End: 2020-03-04
Payer: MEDICARE

## 2020-03-04 ENCOUNTER — APPOINTMENT (OUTPATIENT)
Dept: ELECTROPHYSIOLOGY | Facility: CLINIC | Age: 70
End: 2020-03-04
Payer: MEDICARE

## 2020-03-04 ENCOUNTER — NON-APPOINTMENT (OUTPATIENT)
Age: 70
End: 2020-03-04

## 2020-03-04 VITALS
SYSTOLIC BLOOD PRESSURE: 134 MMHG | WEIGHT: 212 LBS | TEMPERATURE: 98.1 F | HEART RATE: 67 BPM | DIASTOLIC BLOOD PRESSURE: 88 MMHG | OXYGEN SATURATION: 94 % | BODY MASS INDEX: 29.68 KG/M2 | HEIGHT: 71 IN

## 2020-03-04 DIAGNOSIS — Z98.89 OTHER SPECIFIED POSTPROCEDURAL STATES: Chronic | ICD-10-CM

## 2020-03-04 DIAGNOSIS — Z98.890 OTHER SPECIFIED POSTPROCEDURAL STATES: Chronic | ICD-10-CM

## 2020-03-04 DIAGNOSIS — R00.2 PALPITATIONS: ICD-10-CM

## 2020-03-04 PROCEDURE — 83735 ASSAY OF MAGNESIUM: CPT

## 2020-03-04 PROCEDURE — 93291 INTERROG DEV EVAL SCRMS IP: CPT

## 2020-03-04 PROCEDURE — 36415 COLL VENOUS BLD VENIPUNCTURE: CPT

## 2020-03-04 PROCEDURE — 99213 OFFICE O/P EST LOW 20 MIN: CPT

## 2020-03-04 PROCEDURE — 80048 BASIC METABOLIC PNL TOTAL CA: CPT

## 2020-03-04 PROCEDURE — 93000 ELECTROCARDIOGRAM COMPLETE: CPT

## 2020-03-04 NOTE — PHYSICAL EXAM
[General Appearance - Well Developed] : well developed [General Appearance - Well Nourished] : well nourished [Normal Appearance] : normal appearance [Well Groomed] : well groomed [No Deformities] : no deformities [General Appearance - In No Acute Distress] : no acute distress [Normal Conjunctiva] : the conjunctiva exhibited no abnormalities [Eyelids - No Xanthelasma] : the eyelids demonstrated no xanthelasmas [Normal Oral Mucosa] : normal oral mucosa [No Oral Pallor] : no oral pallor [No Oral Cyanosis] : no oral cyanosis [Normal Jugular Venous A Waves Present] : normal jugular venous A waves present [Normal Jugular Venous V Waves Present] : normal jugular venous V waves present [No Jugular Venous Fitzgerald A Waves] : no jugular venous fitzgerald A waves [Respiration, Rhythm And Depth] : normal respiratory rhythm and effort [Exaggerated Use Of Accessory Muscles For Inspiration] : no accessory muscle use [Auscultation Breath Sounds / Voice Sounds] : lungs were clear to auscultation bilaterally [Heart Rate And Rhythm] : heart rate and rhythm were normal [Heart Sounds] : normal S1 and S2 [Murmurs] : no murmurs present [Abdomen Soft] : soft [Abdomen Tenderness] : non-tender [Abdomen Mass (___ Cm)] : no abdominal mass palpated [Abnormal Walk] : normal gait [Gait - Sufficient For Exercise Testing] : the gait was sufficient for exercise testing [Nail Clubbing] : no clubbing of the fingernails [Cyanosis, Localized] : no localized cyanosis [Petechial Hemorrhages (___cm)] : no petechial hemorrhages [Skin Color & Pigmentation] : normal skin color and pigmentation [] : no rash [No Venous Stasis] : no venous stasis [Skin Lesions] : no skin lesions [No Skin Ulcers] : no skin ulcer [No Xanthoma] : no  xanthoma was observed [Oriented To Time, Place, And Person] : oriented to person, place, and time [Affect] : the affect was normal [Mood] : the mood was normal [No Anxiety] : not feeling anxious

## 2020-03-04 NOTE — REASON FOR VISIT
[FreeTextEntry1] : 70 y/o male with h/o syncope presents for further evaluation of palpations. He has a ILR that was reviewed and once again demonstrated episodes of SVT.  He has been continently taking his medications but stated that he had a finger infection in February and was given Bactrim. Following finishing the prescription he has been having episodes of diarrhea.  He denies any CP, lightheadedness or PND.  he states his palpations occur randomly

## 2020-03-04 NOTE — DISCUSSION/SUMMARY
[FreeTextEntry1] : Pt will continue to avoid or minimize caffeine and alcohol intake.  It was recommended that he stay hydrated.  BMP and Mg level was ordered.  Both WNR\par \par Pt Toprol  mgs bid was increased to 125 MG bid.\par Plan discussed with Dr. Lugo \par Pt will continue to monitor his symptoms and seek a f/u with Dr. Lugo if sxs continue

## 2020-03-04 NOTE — REASON FOR VISIT
[FreeTextEntry1] : 68 y/o male with h/o syncope presents for further evaluation of palpations. He has a ILR that was reviewed and once again demonstrated episodes of SVT.  He has been continently taking his medications but stated that he had a finger infection in February and was given Bactrim. Following finishing the prescription he has been having episodes of diarrhea.  He denies any CP, lightheadedness or PND.  he states his palpations occur randomly

## 2020-03-04 NOTE — PHYSICAL EXAM
[General Appearance - Well Developed] : well developed [Normal Appearance] : normal appearance [Well Groomed] : well groomed [General Appearance - Well Nourished] : well nourished [No Deformities] : no deformities [General Appearance - In No Acute Distress] : no acute distress [Normal Conjunctiva] : the conjunctiva exhibited no abnormalities [Eyelids - No Xanthelasma] : the eyelids demonstrated no xanthelasmas [Normal Oral Mucosa] : normal oral mucosa [No Oral Pallor] : no oral pallor [No Oral Cyanosis] : no oral cyanosis [Normal Jugular Venous A Waves Present] : normal jugular venous A waves present [Normal Jugular Venous V Waves Present] : normal jugular venous V waves present [No Jugular Venous Fitzgerald A Waves] : no jugular venous fitzgerald A waves [Respiration, Rhythm And Depth] : normal respiratory rhythm and effort [Exaggerated Use Of Accessory Muscles For Inspiration] : no accessory muscle use [Auscultation Breath Sounds / Voice Sounds] : lungs were clear to auscultation bilaterally [Heart Rate And Rhythm] : heart rate and rhythm were normal [Heart Sounds] : normal S1 and S2 [Murmurs] : no murmurs present [Abdomen Soft] : soft [Abdomen Tenderness] : non-tender [Abdomen Mass (___ Cm)] : no abdominal mass palpated [Abnormal Walk] : normal gait [Gait - Sufficient For Exercise Testing] : the gait was sufficient for exercise testing [Nail Clubbing] : no clubbing of the fingernails [Cyanosis, Localized] : no localized cyanosis [Petechial Hemorrhages (___cm)] : no petechial hemorrhages [Skin Color & Pigmentation] : normal skin color and pigmentation [] : no ischemic changes [No Venous Stasis] : no venous stasis [No Skin Ulcers] : no skin ulcer [Skin Lesions] : no skin lesions [No Xanthoma] : no  xanthoma was observed [Oriented To Time, Place, And Person] : oriented to person, place, and time [Affect] : the affect was normal [Mood] : the mood was normal [No Anxiety] : not feeling anxious

## 2020-03-05 DIAGNOSIS — R00.2 PALPITATIONS: ICD-10-CM

## 2020-03-20 ENCOUNTER — APPOINTMENT (OUTPATIENT)
Dept: ELECTROPHYSIOLOGY | Facility: CLINIC | Age: 70
End: 2020-03-20
Payer: MEDICARE

## 2020-03-20 PROCEDURE — 93298 REM INTERROG DEV EVAL SCRMS: CPT

## 2020-03-20 PROCEDURE — G2066: CPT

## 2020-04-06 ENCOUNTER — RX RENEWAL (OUTPATIENT)
Age: 70
End: 2020-04-06

## 2020-04-22 ENCOUNTER — APPOINTMENT (OUTPATIENT)
Dept: ELECTROPHYSIOLOGY | Facility: CLINIC | Age: 70
End: 2020-04-22
Payer: MEDICARE

## 2020-04-22 PROCEDURE — G2066: CPT

## 2020-04-22 PROCEDURE — 93298 REM INTERROG DEV EVAL SCRMS: CPT

## 2020-05-11 ENCOUNTER — RX RENEWAL (OUTPATIENT)
Age: 70
End: 2020-05-11

## 2020-05-13 ENCOUNTER — APPOINTMENT (OUTPATIENT)
Dept: INTERNAL MEDICINE | Facility: CLINIC | Age: 70
End: 2020-05-13
Payer: MEDICARE

## 2020-05-13 DIAGNOSIS — U07.1 COVID-19: ICD-10-CM

## 2020-05-13 PROCEDURE — 99213 OFFICE O/P EST LOW 20 MIN: CPT | Mod: 95

## 2020-05-13 NOTE — PHYSICAL EXAM
[Normal Sclera/Conjunctiva] : normal sclera/conjunctiva [No Acute Distress] : no acute distress [No JVD] : no jugular venous distention [Normal Outer Ear/Nose] : the outer ears and nose were normal in appearance [No Respiratory Distress] : no respiratory distress  [Normal Affect] : the affect was normal [Normal Insight/Judgement] : insight and judgment were intact

## 2020-05-13 NOTE — REVIEW OF SYSTEMS
[Fever] : no fever [Chills] : no chills [Chest Pain] : no chest pain [Palpitations] : no palpitations [Lower Ext Edema] : no lower extremity edema [Shortness Of Breath] : no shortness of breath [Cough] : no cough [Wheezing] : no wheezing [FreeTextEntry5] : recent cardio eval [Anxiety] : no anxiety [Depression] : no depression

## 2020-05-13 NOTE — HISTORY OF PRESENT ILLNESS
[Home] : at home, [unfilled] , at the time of the visit. [Medical Office: ([facility])___] : at the medical office located in  [Self] : self [Patient] : the patient [de-identified] : Pt asks for telehealth visit during pandemic  Pt needs med renewal  Feel well Has recovered from Covid 19 and has antibody

## 2020-05-26 ENCOUNTER — APPOINTMENT (OUTPATIENT)
Dept: ELECTROPHYSIOLOGY | Facility: CLINIC | Age: 70
End: 2020-05-26
Payer: MEDICARE

## 2020-05-26 PROCEDURE — G2066: CPT

## 2020-05-26 PROCEDURE — 93298 REM INTERROG DEV EVAL SCRMS: CPT

## 2020-06-09 ENCOUNTER — RX RENEWAL (OUTPATIENT)
Age: 70
End: 2020-06-09

## 2020-06-12 ENCOUNTER — RX RENEWAL (OUTPATIENT)
Age: 70
End: 2020-06-12

## 2020-06-18 PROBLEM — U07.1 COVID-19: Status: RESOLVED | Noted: 2020-05-13 | Resolved: 2020-06-18

## 2020-06-26 ENCOUNTER — APPOINTMENT (OUTPATIENT)
Dept: ELECTROPHYSIOLOGY | Facility: CLINIC | Age: 70
End: 2020-06-26
Payer: MEDICARE

## 2020-06-26 PROCEDURE — 93298 REM INTERROG DEV EVAL SCRMS: CPT

## 2020-06-26 PROCEDURE — G2066: CPT

## 2020-07-07 ENCOUNTER — APPOINTMENT (OUTPATIENT)
Dept: CARDIOLOGY | Facility: CLINIC | Age: 70
End: 2020-07-07
Payer: MEDICARE

## 2020-07-07 PROCEDURE — 93306 TTE W/DOPPLER COMPLETE: CPT

## 2020-07-08 ENCOUNTER — NON-APPOINTMENT (OUTPATIENT)
Age: 70
End: 2020-07-08

## 2020-07-08 ENCOUNTER — APPOINTMENT (OUTPATIENT)
Dept: ELECTROPHYSIOLOGY | Facility: CLINIC | Age: 70
End: 2020-07-08
Payer: MEDICARE

## 2020-07-08 VITALS
SYSTOLIC BLOOD PRESSURE: 167 MMHG | DIASTOLIC BLOOD PRESSURE: 99 MMHG | TEMPERATURE: 98.2 F | BODY MASS INDEX: 32.62 KG/M2 | WEIGHT: 233 LBS | OXYGEN SATURATION: 100 % | HEART RATE: 60 BPM | HEIGHT: 71 IN

## 2020-07-08 PROCEDURE — 93291 INTERROG DEV EVAL SCRMS IP: CPT

## 2020-07-08 PROCEDURE — 99214 OFFICE O/P EST MOD 30 MIN: CPT | Mod: 25

## 2020-07-08 RX ORDER — METOPROLOL SUCCINATE 25 MG/1
25 TABLET, EXTENDED RELEASE ORAL
Qty: 180 | Refills: 3 | Status: DISCONTINUED | COMMUNITY
Start: 1900-01-01 | End: 2020-07-08

## 2020-07-13 ENCOUNTER — APPOINTMENT (OUTPATIENT)
Dept: CARDIOLOGY | Facility: CLINIC | Age: 70
End: 2020-07-13

## 2020-07-14 ENCOUNTER — NON-APPOINTMENT (OUTPATIENT)
Age: 70
End: 2020-07-14

## 2020-07-14 NOTE — HISTORY OF PRESENT ILLNESS
[FreeTextEntry1] : This is a 70 year male with h/o syncope presents for further evaluation of palpations. He has a ILR that was reviewed and once again demonstrated episodes of SVT.  He is sometimes symptomatic with episodes and some episodes occur during sleep. He is compliant with medications. YOKASTA MCCLELLAND  denies chest pain, chest pressure, shortness of breath, lightheadedness, dizziness, syncope, presyncope, orthopnea, PND, or edema. \par \par EP study in 2017 demonstrated an atrial tachycardia with p wave morphology and CS consistent with left sided atrial tachycardia.

## 2020-07-14 NOTE — PHYSICAL EXAM
[General Appearance - Well Developed] : well developed [Normal Appearance] : normal appearance [Well Groomed] : well groomed [General Appearance - Well Nourished] : well nourished [General Appearance - In No Acute Distress] : no acute distress [No Deformities] : no deformities [Normal Conjunctiva] : the conjunctiva exhibited no abnormalities [Eyelids - No Xanthelasma] : the eyelids demonstrated no xanthelasmas [Normal Oral Mucosa] : normal oral mucosa [No Oral Pallor] : no oral pallor [No Oral Cyanosis] : no oral cyanosis [Normal Jugular Venous A Waves Present] : normal jugular venous A waves present [No Jugular Venous Fitzgerald A Waves] : no jugular venous fitzgerald A waves [Normal Jugular Venous V Waves Present] : normal jugular venous V waves present [Respiration, Rhythm And Depth] : normal respiratory rhythm and effort [Auscultation Breath Sounds / Voice Sounds] : lungs were clear to auscultation bilaterally [Exaggerated Use Of Accessory Muscles For Inspiration] : no accessory muscle use [Heart Sounds] : normal S1 and S2 [Heart Rate And Rhythm] : heart rate and rhythm were normal [Abdomen Soft] : soft [Murmurs] : no murmurs present [Abdomen Tenderness] : non-tender [Abdomen Mass (___ Cm)] : no abdominal mass palpated [Abnormal Walk] : normal gait [Nail Clubbing] : no clubbing of the fingernails [Gait - Sufficient For Exercise Testing] : the gait was sufficient for exercise testing [Cyanosis, Localized] : no localized cyanosis [Petechial Hemorrhages (___cm)] : no petechial hemorrhages [Skin Color & Pigmentation] : normal skin color and pigmentation [No Venous Stasis] : no venous stasis [] : no rash [Skin Lesions] : no skin lesions [No Skin Ulcers] : no skin ulcer [No Xanthoma] : no  xanthoma was observed [Oriented To Time, Place, And Person] : oriented to person, place, and time [Affect] : the affect was normal [Mood] : the mood was normal [No Anxiety] : not feeling anxious

## 2020-07-14 NOTE — ASSESSMENT
[FreeTextEntry1] : This is a 70 year old man with recurrent SVT.  We discussed treatment options of continuing on medical therapy, adding an antiarrhythmic drug, or catheter ablation. We discussed the procedures, outcomes and risks of catheter ablation at length. The risks discussed included but were not limited to bleeding, perforation, deep venous thrombosis, cardiac tamponade with a need for intervention, stroke, and complete heart block. After all questions were answered. \par  \par He wishes to continue medical therapy.

## 2020-07-23 ENCOUNTER — APPOINTMENT (OUTPATIENT)
Dept: ELECTROPHYSIOLOGY | Facility: CLINIC | Age: 70
End: 2020-07-23

## 2020-08-03 ENCOUNTER — NON-APPOINTMENT (OUTPATIENT)
Age: 70
End: 2020-08-03

## 2020-08-03 ENCOUNTER — APPOINTMENT (OUTPATIENT)
Dept: CARDIOLOGY | Facility: CLINIC | Age: 70
End: 2020-08-03
Payer: MEDICARE

## 2020-08-03 VITALS
HEART RATE: 61 BPM | OXYGEN SATURATION: 97 % | WEIGHT: 226 LBS | BODY MASS INDEX: 31.64 KG/M2 | SYSTOLIC BLOOD PRESSURE: 164 MMHG | DIASTOLIC BLOOD PRESSURE: 89 MMHG | HEIGHT: 71 IN

## 2020-08-03 PROCEDURE — 99214 OFFICE O/P EST MOD 30 MIN: CPT

## 2020-08-03 PROCEDURE — 93000 ELECTROCARDIOGRAM COMPLETE: CPT

## 2020-08-04 ENCOUNTER — TRANSCRIPTION ENCOUNTER (OUTPATIENT)
Age: 70
End: 2020-08-04

## 2020-08-04 NOTE — HISTORY OF PRESENT ILLNESS
[FreeTextEntry1] : This is a 70 year-old man, who presents for evaluation after recurrent episodes of syncope. He underwent a telemetry monitoring that demonstrated episodes of supraventricular tachycardia. YOKASTA MCCLELLAND  denies chest pain, chest pressure, shortness of breath. \par Pt reports shaq Covid 2 months earlier. He has recently followed up with Dr. Lugo for evaluation of SVT.

## 2020-08-04 NOTE — ASSESSMENT
[FreeTextEntry1] : This is a 70 -year-old gentleman with episodes of tachycardia. ECG was reviewed. Reports from EP were evaluated. Pt will follow up with Dr. Lugo. He will notify us if he experiences any symptoms post Covid. Ablation will be considered.

## 2020-08-04 NOTE — CARDIOLOGY SUMMARY
[___] : [unfilled] [LVEF ___%] : LVEF [unfilled]% [Enlarged] : enlarged LA size [None] : normal LV function [Mild] : mild mitral regurgitation

## 2020-08-04 NOTE — PHYSICAL EXAM
[General Appearance - Well Developed] : well developed [Normal Appearance] : normal appearance [General Appearance - In No Acute Distress] : no acute distress [Well Groomed] : well groomed [No Deformities] : no deformities [General Appearance - Well Nourished] : well nourished [Normal Conjunctiva] : the conjunctiva exhibited no abnormalities [Eyelids - No Xanthelasma] : the eyelids demonstrated no xanthelasmas [Normal Oral Mucosa] : normal oral mucosa [No Oral Cyanosis] : no oral cyanosis [No Oral Pallor] : no oral pallor [Normal Jugular Venous V Waves Present] : normal jugular venous V waves present [Normal Jugular Venous A Waves Present] : normal jugular venous A waves present [No Jugular Venous Fitzgerald A Waves] : no jugular venous fitzgerald A waves [Respiration, Rhythm And Depth] : normal respiratory rhythm and effort [Exaggerated Use Of Accessory Muscles For Inspiration] : no accessory muscle use [Auscultation Breath Sounds / Voice Sounds] : lungs were clear to auscultation bilaterally [Abdomen Soft] : soft [Abdomen Tenderness] : non-tender [Abdomen Mass (___ Cm)] : no abdominal mass palpated [Abnormal Walk] : normal gait [Gait - Sufficient For Exercise Testing] : the gait was sufficient for exercise testing [Nail Clubbing] : no clubbing of the fingernails [Cyanosis, Localized] : no localized cyanosis [Petechial Hemorrhages (___cm)] : no petechial hemorrhages [No Venous Stasis] : no venous stasis [Skin Color & Pigmentation] : normal skin color and pigmentation [] : no rash [No Skin Ulcers] : no skin ulcer [No Xanthoma] : no  xanthoma was observed [Skin Lesions] : no skin lesions [Affect] : the affect was normal [Oriented To Time, Place, And Person] : oriented to person, place, and time [Mood] : the mood was normal [No Anxiety] : not feeling anxious [Normal Rate] : normal [Normal S1] : normal S1 [S3] : no S3 [Normal S2] : normal S2 [S4] : no S4 [No Murmur] : no murmurs heard [Left Carotid Bruit] : no bruit heard over the left carotid [Right Carotid Bruit] : no bruit heard over the right carotid [Right Femoral Bruit] : no bruit heard over the right femoral artery [Left Femoral Bruit] : no bruit heard over the left femoral artery [2+] : left 2+ [No Pitting Edema] : no pitting edema present [No Abnormalities] : the abdominal aorta was not enlarged and no bruit was heard

## 2020-08-30 ENCOUNTER — APPOINTMENT (OUTPATIENT)
Dept: ELECTROPHYSIOLOGY | Facility: CLINIC | Age: 70
End: 2020-08-30

## 2020-09-04 ENCOUNTER — APPOINTMENT (OUTPATIENT)
Dept: ELECTROPHYSIOLOGY | Facility: CLINIC | Age: 70
End: 2020-09-04
Payer: MEDICARE

## 2020-09-04 PROCEDURE — 93298 REM INTERROG DEV EVAL SCRMS: CPT

## 2020-09-04 PROCEDURE — G2066: CPT

## 2020-09-10 ENCOUNTER — APPOINTMENT (OUTPATIENT)
Dept: ELECTROPHYSIOLOGY | Facility: CLINIC | Age: 70
End: 2020-09-10
Payer: MEDICARE

## 2020-09-10 VITALS
HEART RATE: 63 BPM | WEIGHT: 225 LBS | SYSTOLIC BLOOD PRESSURE: 137 MMHG | OXYGEN SATURATION: 99 % | HEIGHT: 71 IN | DIASTOLIC BLOOD PRESSURE: 86 MMHG | BODY MASS INDEX: 31.5 KG/M2

## 2020-09-10 LAB
ALBUMIN SERPL ELPH-MCNC: 4.6 G/DL
ALP BLD-CCNC: 64 U/L
ALT SERPL-CCNC: 35 U/L
ANION GAP SERPL CALC-SCNC: 12 MMOL/L
AST SERPL-CCNC: 20 U/L
BASOPHILS # BLD AUTO: 0.08 K/UL
BASOPHILS NFR BLD AUTO: 1.1 %
BILIRUB SERPL-MCNC: 0.3 MG/DL
BUN SERPL-MCNC: 17 MG/DL
CALCIUM SERPL-MCNC: 9.1 MG/DL
CHLORIDE SERPL-SCNC: 102 MMOL/L
CHOLEST SERPL-MCNC: 212 MG/DL
CHOLEST/HDLC SERPL: 4.4 RATIO
CO2 SERPL-SCNC: 28 MMOL/L
CREAT SERPL-MCNC: 0.83 MG/DL
EOSINOPHIL # BLD AUTO: 0.48 K/UL
EOSINOPHIL NFR BLD AUTO: 6.9 %
ESTIMATED AVERAGE GLUCOSE: 160 MG/DL
GLUCOSE SERPL-MCNC: 192 MG/DL
HBA1C MFR BLD HPLC: 7.2 %
HCT VFR BLD CALC: 44.2 %
HDLC SERPL-MCNC: 48 MG/DL
HGB BLD-MCNC: 13.8 G/DL
IMM GRANULOCYTES NFR BLD AUTO: 0.3 %
LDLC SERPL CALC-MCNC: 147 MG/DL
LYMPHOCYTES # BLD AUTO: 2.8 K/UL
LYMPHOCYTES NFR BLD AUTO: 40.2 %
MAN DIFF?: NORMAL
MCHC RBC-ENTMCNC: 30.9 PG
MCHC RBC-ENTMCNC: 31.2 GM/DL
MCV RBC AUTO: 99.1 FL
MONOCYTES # BLD AUTO: 0.79 K/UL
MONOCYTES NFR BLD AUTO: 11.4 %
NEUTROPHILS # BLD AUTO: 2.79 K/UL
NEUTROPHILS NFR BLD AUTO: 40.1 %
PLATELET # BLD AUTO: 260 K/UL
POTASSIUM SERPL-SCNC: 5.3 MMOL/L
PROT SERPL-MCNC: 6.8 G/DL
RBC # BLD: 4.46 M/UL
RBC # FLD: 13.1 %
SODIUM SERPL-SCNC: 141 MMOL/L
TRIGL SERPL-MCNC: 86 MG/DL
TSH SERPL-ACNC: 2.51 UIU/ML
WBC # FLD AUTO: 6.96 K/UL

## 2020-09-10 PROCEDURE — 99214 OFFICE O/P EST MOD 30 MIN: CPT

## 2020-09-10 NOTE — PHYSICAL EXAM
[General Appearance - Well Developed] : well developed [Normal Appearance] : normal appearance [Well Groomed] : well groomed [General Appearance - Well Nourished] : well nourished [No Deformities] : no deformities [General Appearance - In No Acute Distress] : no acute distress [Eyelids - No Xanthelasma] : the eyelids demonstrated no xanthelasmas [Normal Conjunctiva] : the conjunctiva exhibited no abnormalities [Normal Oral Mucosa] : normal oral mucosa [No Oral Pallor] : no oral pallor [Normal Jugular Venous A Waves Present] : normal jugular venous A waves present [No Oral Cyanosis] : no oral cyanosis [No Jugular Venous Fitzgerald A Waves] : no jugular venous fitzgerald A waves [Normal Jugular Venous V Waves Present] : normal jugular venous V waves present [Respiration, Rhythm And Depth] : normal respiratory rhythm and effort [Auscultation Breath Sounds / Voice Sounds] : lungs were clear to auscultation bilaterally [Exaggerated Use Of Accessory Muscles For Inspiration] : no accessory muscle use [Heart Rate And Rhythm] : heart rate and rhythm were normal [Heart Sounds] : normal S1 and S2 [Murmurs] : no murmurs present [Abdomen Tenderness] : non-tender [Abdomen Soft] : soft [Abnormal Walk] : normal gait [Abdomen Mass (___ Cm)] : no abdominal mass palpated [Nail Clubbing] : no clubbing of the fingernails [Gait - Sufficient For Exercise Testing] : the gait was sufficient for exercise testing [Cyanosis, Localized] : no localized cyanosis [Petechial Hemorrhages (___cm)] : no petechial hemorrhages [] : no ischemic changes [Skin Color & Pigmentation] : normal skin color and pigmentation [Skin Lesions] : no skin lesions [No Venous Stasis] : no venous stasis [No Xanthoma] : no  xanthoma was observed [No Skin Ulcers] : no skin ulcer [Oriented To Time, Place, And Person] : oriented to person, place, and time [No Anxiety] : not feeling anxious [Affect] : the affect was normal [Mood] : the mood was normal

## 2020-09-14 ENCOUNTER — RX RENEWAL (OUTPATIENT)
Age: 70
End: 2020-09-14

## 2020-10-11 ENCOUNTER — APPOINTMENT (OUTPATIENT)
Dept: ELECTROPHYSIOLOGY | Facility: CLINIC | Age: 70
End: 2020-10-11
Payer: MEDICARE

## 2020-10-12 PROCEDURE — G2066: CPT

## 2020-10-12 PROCEDURE — 93298 REM INTERROG DEV EVAL SCRMS: CPT

## 2020-11-05 ENCOUNTER — RX RENEWAL (OUTPATIENT)
Age: 70
End: 2020-11-05

## 2020-11-15 ENCOUNTER — APPOINTMENT (OUTPATIENT)
Dept: ELECTROPHYSIOLOGY | Facility: CLINIC | Age: 70
End: 2020-11-15
Payer: MEDICARE

## 2020-11-16 PROCEDURE — 93298 REM INTERROG DEV EVAL SCRMS: CPT

## 2020-11-16 PROCEDURE — G2066: CPT

## 2020-12-20 ENCOUNTER — APPOINTMENT (OUTPATIENT)
Dept: ELECTROPHYSIOLOGY | Facility: CLINIC | Age: 70
End: 2020-12-20
Payer: MEDICARE

## 2020-12-21 DIAGNOSIS — U07.1 COVID-19: ICD-10-CM

## 2020-12-21 PROCEDURE — G2066: CPT

## 2020-12-21 PROCEDURE — 93298 REM INTERROG DEV EVAL SCRMS: CPT

## 2020-12-30 ENCOUNTER — RX RENEWAL (OUTPATIENT)
Age: 70
End: 2020-12-30

## 2021-01-06 LAB
SARS-COV-2 IGG SERPL IA-ACNC: 0.66 INDEX
SARS-COV-2 IGG SERPL QL IA: NEGATIVE

## 2021-01-25 ENCOUNTER — APPOINTMENT (OUTPATIENT)
Dept: ELECTROPHYSIOLOGY | Facility: CLINIC | Age: 71
End: 2021-01-25
Payer: MEDICARE

## 2021-01-25 PROCEDURE — 93298 REM INTERROG DEV EVAL SCRMS: CPT

## 2021-01-25 PROCEDURE — G2066: CPT

## 2021-02-01 RX ORDER — ALFUZOSIN HYDROCHLORIDE 10 MG/1
10 TABLET, EXTENDED RELEASE ORAL
Qty: 90 | Refills: 0 | Status: ACTIVE | COMMUNITY
Start: 1900-01-01 | End: 1900-01-01

## 2021-02-10 ENCOUNTER — RX RENEWAL (OUTPATIENT)
Age: 71
End: 2021-02-10

## 2021-02-22 ENCOUNTER — NON-APPOINTMENT (OUTPATIENT)
Age: 71
End: 2021-02-22

## 2021-02-22 ENCOUNTER — APPOINTMENT (OUTPATIENT)
Dept: CARDIOLOGY | Facility: CLINIC | Age: 71
End: 2021-02-22
Payer: MEDICARE

## 2021-02-22 VITALS
HEIGHT: 71 IN | DIASTOLIC BLOOD PRESSURE: 72 MMHG | HEART RATE: 74 BPM | BODY MASS INDEX: 32.34 KG/M2 | SYSTOLIC BLOOD PRESSURE: 126 MMHG | OXYGEN SATURATION: 94 % | WEIGHT: 231 LBS

## 2021-02-22 DIAGNOSIS — Z86.39 PERSONAL HISTORY OF OTHER ENDOCRINE, NUTRITIONAL AND METABOLIC DISEASE: ICD-10-CM

## 2021-02-22 PROCEDURE — 99072 ADDL SUPL MATRL&STAF TM PHE: CPT

## 2021-02-22 PROCEDURE — 99214 OFFICE O/P EST MOD 30 MIN: CPT

## 2021-02-22 PROCEDURE — 93000 ELECTROCARDIOGRAM COMPLETE: CPT

## 2021-02-22 NOTE — HISTORY OF PRESENT ILLNESS
[FreeTextEntry1] : This is a 71 year-old man, who presents for evaluation after recurrent episodes of syncope. He underwent a telemetry monitoring that demonstrated episodes of supraventricular tachycardia. He  denies chest pain, chest pressure, shortness of breath. He does report occasional lightheadedness which he associates with his arrhythmia on days which he took his meds late in the day. \par

## 2021-02-22 NOTE — ASSESSMENT
[FreeTextEntry1] : This is a 71 -year-old gentleman with episodes of tachycardia. ECG was reviewed. Reports from EP were evaluated. Pt will follow up with Dr. Lugo. He will notify us if he experiences any symptoms

## 2021-02-25 ENCOUNTER — APPOINTMENT (OUTPATIENT)
Dept: ELECTROPHYSIOLOGY | Facility: CLINIC | Age: 71
End: 2021-02-25
Payer: MEDICARE

## 2021-02-26 ENCOUNTER — NON-APPOINTMENT (OUTPATIENT)
Age: 71
End: 2021-02-26

## 2021-02-26 PROCEDURE — G2066: CPT

## 2021-02-26 PROCEDURE — 93298 REM INTERROG DEV EVAL SCRMS: CPT

## 2021-03-04 ENCOUNTER — APPOINTMENT (OUTPATIENT)
Dept: ELECTROPHYSIOLOGY | Facility: CLINIC | Age: 71
End: 2021-03-04
Payer: MEDICARE

## 2021-03-04 VITALS
RESPIRATION RATE: 16 BRPM | DIASTOLIC BLOOD PRESSURE: 85 MMHG | WEIGHT: 231 LBS | HEIGHT: 71 IN | OXYGEN SATURATION: 96 % | SYSTOLIC BLOOD PRESSURE: 147 MMHG | HEART RATE: 68 BPM | BODY MASS INDEX: 32.34 KG/M2

## 2021-03-04 PROCEDURE — 99214 OFFICE O/P EST MOD 30 MIN: CPT | Mod: 25

## 2021-03-04 PROCEDURE — 99072 ADDL SUPL MATRL&STAF TM PHE: CPT

## 2021-03-04 PROCEDURE — 93285 PRGRMG DEV EVAL SCRMS IP: CPT

## 2021-03-20 NOTE — PHYSICAL EXAM
[General Appearance - Well Developed] : well developed [Normal Appearance] : normal appearance [Well Groomed] : well groomed [General Appearance - Well Nourished] : well nourished [No Deformities] : no deformities [General Appearance - In No Acute Distress] : no acute distress [Normal Conjunctiva] : the conjunctiva exhibited no abnormalities [Eyelids - No Xanthelasma] : the eyelids demonstrated no xanthelasmas [Normal Oral Mucosa] : normal oral mucosa [No Oral Pallor] : no oral pallor [No Oral Cyanosis] : no oral cyanosis [Normal Jugular Venous A Waves Present] : normal jugular venous A waves present [Normal Jugular Venous V Waves Present] : normal jugular venous V waves present [No Jugular Venous Fitzgerald A Waves] : no jugular venous fitzgerald A waves [Respiration, Rhythm And Depth] : normal respiratory rhythm and effort [Exaggerated Use Of Accessory Muscles For Inspiration] : no accessory muscle use [Auscultation Breath Sounds / Voice Sounds] : lungs were clear to auscultation bilaterally [Heart Rate And Rhythm] : heart rate and rhythm were normal [Heart Sounds] : normal S1 and S2 [Murmurs] : no murmurs present [Abdomen Soft] : soft [Abdomen Tenderness] : non-tender [Abdomen Mass (___ Cm)] : no abdominal mass palpated [Abnormal Walk] : normal gait [Gait - Sufficient For Exercise Testing] : the gait was sufficient for exercise testing [Nail Clubbing] : no clubbing of the fingernails [Cyanosis, Localized] : no localized cyanosis [Petechial Hemorrhages (___cm)] : no petechial hemorrhages [Skin Color & Pigmentation] : normal skin color and pigmentation [] : no rash [No Venous Stasis] : no venous stasis [Skin Lesions] : no skin lesions [No Skin Ulcers] : no skin ulcer [No Xanthoma] : no  xanthoma was observed [Oriented To Time, Place, And Person] : oriented to person, place, and time [Affect] : the affect was normal [Mood] : the mood was normal [No Anxiety] : not feeling anxious

## 2021-03-20 NOTE — ASSESSMENT
[FreeTextEntry1] : This is a 71 year old man with recurrent SVT.  We discussed treatment options of continuing on medical therapy, adding an antiarrhythmic drug, or catheter ablation. We discussed the procedures, outcomes and risks of catheter ablation at length. The risks discussed included but were not limited to bleeding, perforation, deep venous thrombosis, cardiac tamponade with a need for intervention, stroke, and complete heart block. After all questions were answered will proceed with ablation.

## 2021-04-01 ENCOUNTER — APPOINTMENT (OUTPATIENT)
Dept: ELECTROPHYSIOLOGY | Facility: CLINIC | Age: 71
End: 2021-04-01
Payer: MEDICARE

## 2021-04-02 ENCOUNTER — NON-APPOINTMENT (OUTPATIENT)
Age: 71
End: 2021-04-02

## 2021-04-02 PROCEDURE — G2066: CPT

## 2021-04-02 PROCEDURE — 93298 REM INTERROG DEV EVAL SCRMS: CPT

## 2021-04-08 ENCOUNTER — NON-APPOINTMENT (OUTPATIENT)
Age: 71
End: 2021-04-08

## 2021-04-08 DIAGNOSIS — Z01.818 ENCOUNTER FOR OTHER PREPROCEDURAL EXAMINATION: ICD-10-CM

## 2021-04-09 ENCOUNTER — APPOINTMENT (OUTPATIENT)
Dept: INTERNAL MEDICINE | Facility: CLINIC | Age: 71
End: 2021-04-09
Payer: MEDICARE

## 2021-04-09 ENCOUNTER — APPOINTMENT (OUTPATIENT)
Dept: DISASTER EMERGENCY | Facility: CLINIC | Age: 71
End: 2021-04-09

## 2021-04-09 ENCOUNTER — OUTPATIENT (OUTPATIENT)
Dept: OUTPATIENT SERVICES | Facility: HOSPITAL | Age: 71
LOS: 1 days | End: 2021-04-09
Payer: MEDICARE

## 2021-04-09 ENCOUNTER — RESULT REVIEW (OUTPATIENT)
Age: 71
End: 2021-04-09

## 2021-04-09 ENCOUNTER — RX RENEWAL (OUTPATIENT)
Age: 71
End: 2021-04-09

## 2021-04-09 VITALS
TEMPERATURE: 97.9 F | WEIGHT: 232 LBS | RESPIRATION RATE: 16 BRPM | OXYGEN SATURATION: 96 % | HEART RATE: 74 BPM | HEIGHT: 71 IN | BODY MASS INDEX: 32.48 KG/M2

## 2021-04-09 VITALS — DIASTOLIC BLOOD PRESSURE: 78 MMHG | SYSTOLIC BLOOD PRESSURE: 140 MMHG

## 2021-04-09 DIAGNOSIS — Z98.89 OTHER SPECIFIED POSTPROCEDURAL STATES: Chronic | ICD-10-CM

## 2021-04-09 DIAGNOSIS — I47.0 RE-ENTRY VENTRICULAR ARRHYTHMIA: ICD-10-CM

## 2021-04-09 DIAGNOSIS — Z98.890 OTHER SPECIFIED POSTPROCEDURAL STATES: Chronic | ICD-10-CM

## 2021-04-09 DIAGNOSIS — Z23 ENCOUNTER FOR IMMUNIZATION: ICD-10-CM

## 2021-04-09 PROCEDURE — 99072 ADDL SUPL MATRL&STAF TM PHE: CPT

## 2021-04-09 PROCEDURE — 90732 PPSV23 VACC 2 YRS+ SUBQ/IM: CPT

## 2021-04-09 PROCEDURE — 99214 OFFICE O/P EST MOD 30 MIN: CPT | Mod: 25

## 2021-04-09 PROCEDURE — 75572 CT HRT W/3D IMAGE: CPT

## 2021-04-09 PROCEDURE — G0009: CPT

## 2021-04-09 PROCEDURE — 75572 CT HRT W/3D IMAGE: CPT | Mod: 26

## 2021-04-09 NOTE — PHYSICAL EXAM
[No Acute Distress] : no acute distress [Normal Sclera/Conjunctiva] : normal sclera/conjunctiva [Normal Outer Ear/Nose] : the outer ears and nose were normal in appearance [No JVD] : no jugular venous distention [No Respiratory Distress] : no respiratory distress  [Soft] : abdomen soft [Non Tender] : non-tender [Non-distended] : non-distended [de-identified] : left inguinal reduceable hernia

## 2021-04-09 NOTE — REVIEW OF SYSTEMS
[Palpitations] : palpitations [Chills] : no chills [Chest Pain] : no chest pain [Shortness Of Breath] : no shortness of breath [Wheezing] : no wheezing [Cough] : no cough [Abdominal Pain] : no abdominal pain [Nausea] : no nausea [Vomiting] : no vomiting [Heartburn] : no heartburn

## 2021-04-09 NOTE — HISTORY OF PRESENT ILLNESS
[de-identified] : Pt comes as urgent appt for bulge in LLQ   Has appt for ablation by Dr Lugo on Monday and concerned about this issue  NO sig pain and no change in BM or vomit

## 2021-04-10 LAB — SARS-COV-2 N GENE NPH QL NAA+PROBE: NOT DETECTED

## 2021-04-12 ENCOUNTER — RX RENEWAL (OUTPATIENT)
Age: 71
End: 2021-04-12

## 2021-04-12 NOTE — H&P PST ADULT - HISTORY OF PRESENT ILLNESS
71 year-old man with recurrent episodes of syncope. He underwent a telemetry monitoring that demonstrated episodes of supraventricular tachycardia. He denies chest pain, chest pressure, shortness of breath. He does report occasional lightheadedness which he associates with his arrhythmia on days which he took his meds late in the day.  EP study in 2017 demonstrated an atrial tachycardia with p wave morphology and CS consistent with left sided atrial tachycardia. Spoke to patient explained that this AT/SVT is left sided and at times may appears as AF (wobbling cl from 360-190ms, no clear P wave) and was started on xarelto 20mg. Pt presents electively today for atrial tachycardia ablations.        Cardiology Summary  Cardiac CT:   VISUALIZED LUNGS: clear:  MEDIASTINUM:  no significant mediastinal adenopathy.  BONES:  grossly unremarkable  AORTA: No thoracic aortic dissection is noted in the visualized thoracic aorta.  PULMONARY ARTERIES: No pulmonary embolus is noted within the visualized central pulmonary arteries.  PERICARDIUM: normal  HEART: Normal in size.  No ASA or VSD.    CORONARY: This study was not tailored to evaluate the coronary arteries.    LEFT ATRIAL APPENDAGE:  Morphology: windsock  Size of ostium: 2.0 x 1.7cm.  Shape of ostium: oval  Complete opacification : Yes, no clot.    LEFT  Superior pulmonary vein: 2.4 x 1.6 cm  Inferior pulmonary vein measures 1.7 x 1.3 cm.      RIGHT  Superior pulmonary vein: 1.8 x 1.5 cm  Inferior pulmonary vein measures 1.8 x 1.4 cm.      Esophagus:  directly posterior to the left atrium.    IMPRESSION:    Pulmonary veins anatomy as described.  EK2020, SR 60 BPM with PRWP prolonged MA   EK18, sinus rhythm 1st degree AV block otherwise normal.   Echo: 18, moderate Ao dilitation, mild concentric LVH, normal LV function, no pulmonary hypertension, enlarged LA size, mild mitral regurgitation LVEF 60%.      71 year-old man with PMH BPH, DM, recurrent episodes of syncope. He underwent a telemetry monitoring that demonstrated episodes of supraventricular tachycardia. He denies chest pain, chest pressure, shortness of breath. He does report occasional lightheadedness which he associates with his arrhythmia on days which he took his meds late in the day.  EP study in 2017 demonstrated an atrial tachycardia with p wave morphology and CS consistent with left sided atrial tachycardia. Spoke to patient explained that this AT/SVT is left sided and at times may appears as AF (wobbling cl from 360-190ms, no clear P wave) and was started on xarelto 20mg. Pt presents electively today for atrial tachycardia ablations.        Cardiology Summary  Cardiac CT:   VISUALIZED LUNGS: clear:  MEDIASTINUM:  no significant mediastinal adenopathy.  BONES:  grossly unremarkable  AORTA: No thoracic aortic dissection is noted in the visualized thoracic aorta.  PULMONARY ARTERIES: No pulmonary embolus is noted within the visualized central pulmonary arteries.  PERICARDIUM: normal  HEART: Normal in size.  No ASA or VSD.    CORONARY: This study was not tailored to evaluate the coronary arteries.    LEFT ATRIAL APPENDAGE:  Morphology: windsock  Size of ostium: 2.0 x 1.7cm.  Shape of ostium: oval  Complete opacification : Yes, no clot.    LEFT  Superior pulmonary vein: 2.4 x 1.6 cm  Inferior pulmonary vein measures 1.7 x 1.3 cm.      RIGHT  Superior pulmonary vein: 1.8 x 1.5 cm  Inferior pulmonary vein measures 1.8 x 1.4 cm.      Esophagus:  directly posterior to the left atrium.    IMPRESSION:    Pulmonary veins anatomy as described.  EK2020, SR 60 BPM with PRWP prolonged CT   EK18, sinus rhythm 1st degree AV block otherwise normal.   Echo: 18, moderate Ao dilitation, mild concentric LVH, normal LV function, no pulmonary hypertension, enlarged LA size, mild mitral regurgitation LVEF 60%.

## 2021-04-26 ENCOUNTER — APPOINTMENT (OUTPATIENT)
Dept: DISASTER EMERGENCY | Facility: CLINIC | Age: 71
End: 2021-04-26

## 2021-04-27 LAB — SARS-COV-2 N GENE NPH QL NAA+PROBE: NOT DETECTED

## 2021-04-28 ENCOUNTER — APPOINTMENT (OUTPATIENT)
Dept: SURGERY | Facility: CLINIC | Age: 71
End: 2021-04-28
Payer: MEDICARE

## 2021-04-28 VITALS
DIASTOLIC BLOOD PRESSURE: 80 MMHG | HEART RATE: 66 BPM | BODY MASS INDEX: 33.36 KG/M2 | HEIGHT: 71 IN | TEMPERATURE: 97.2 F | WEIGHT: 238.32 LBS | OXYGEN SATURATION: 96 % | SYSTOLIC BLOOD PRESSURE: 130 MMHG

## 2021-04-28 DIAGNOSIS — Z92.29 PERSONAL HISTORY OF OTHER DRUG THERAPY: ICD-10-CM

## 2021-04-28 PROCEDURE — 99205 OFFICE O/P NEW HI 60 MIN: CPT

## 2021-04-28 PROCEDURE — 99072 ADDL SUPL MATRL&STAF TM PHE: CPT

## 2021-04-28 RX ORDER — ASPIRIN 81 MG
81 TABLET, DELAYED RELEASE (ENTERIC COATED) ORAL DAILY
Qty: 30 | Refills: 2 | Status: COMPLETED | COMMUNITY
End: 2021-04-28

## 2021-04-29 ENCOUNTER — INPATIENT (INPATIENT)
Facility: HOSPITAL | Age: 71
LOS: 0 days | Discharge: HOME | DRG: 274 | End: 2021-04-30
Attending: INTERNAL MEDICINE | Admitting: INTERNAL MEDICINE
Payer: MEDICARE

## 2021-04-29 ENCOUNTER — TRANSCRIPTION ENCOUNTER (OUTPATIENT)
Age: 71
End: 2021-04-29

## 2021-04-29 VITALS
RESPIRATION RATE: 16 BRPM | HEART RATE: 70 BPM | SYSTOLIC BLOOD PRESSURE: 138 MMHG | WEIGHT: 231.93 LBS | HEIGHT: 71 IN | DIASTOLIC BLOOD PRESSURE: 74 MMHG | OXYGEN SATURATION: 96 %

## 2021-04-29 DIAGNOSIS — Z95.818 PRESENCE OF OTHER CARDIAC IMPLANTS AND GRAFTS: Chronic | ICD-10-CM

## 2021-04-29 DIAGNOSIS — Z98.89 OTHER SPECIFIED POSTPROCEDURAL STATES: Chronic | ICD-10-CM

## 2021-04-29 DIAGNOSIS — R00.2 PALPITATIONS: ICD-10-CM

## 2021-04-29 DIAGNOSIS — Z98.890 OTHER SPECIFIED POSTPROCEDURAL STATES: Chronic | ICD-10-CM

## 2021-04-29 LAB
ABO RH CONFIRMATION: SIGNIFICANT CHANGE UP
ANION GAP SERPL CALC-SCNC: 14 MMOL/L — SIGNIFICANT CHANGE UP (ref 5–17)
APTT BLD: 35.9 SEC — HIGH (ref 27.5–35.5)
BLD GP AB SCN SERPL QL: SIGNIFICANT CHANGE UP
BUN SERPL-MCNC: 10 MG/DL — SIGNIFICANT CHANGE UP (ref 8–20)
CALCIUM SERPL-MCNC: 9 MG/DL — SIGNIFICANT CHANGE UP (ref 8.6–10.2)
CHLORIDE SERPL-SCNC: 100 MMOL/L — SIGNIFICANT CHANGE UP (ref 98–107)
CO2 SERPL-SCNC: 25 MMOL/L — SIGNIFICANT CHANGE UP (ref 22–29)
CREAT SERPL-MCNC: 0.59 MG/DL — SIGNIFICANT CHANGE UP (ref 0.5–1.3)
GLUCOSE BLDC GLUCOMTR-MCNC: 123 MG/DL — HIGH (ref 70–99)
GLUCOSE SERPL-MCNC: 124 MG/DL — HIGH (ref 70–99)
HCT VFR BLD CALC: 41.9 % — SIGNIFICANT CHANGE UP (ref 39–50)
HGB BLD-MCNC: 14.2 G/DL — SIGNIFICANT CHANGE UP (ref 13–17)
INR BLD: 1.24 RATIO — HIGH (ref 0.88–1.16)
MAGNESIUM SERPL-MCNC: 1.7 MG/DL — SIGNIFICANT CHANGE UP (ref 1.6–2.6)
MCHC RBC-ENTMCNC: 32.4 PG — SIGNIFICANT CHANGE UP (ref 27–34)
MCHC RBC-ENTMCNC: 33.9 GM/DL — SIGNIFICANT CHANGE UP (ref 32–36)
MCV RBC AUTO: 95.7 FL — SIGNIFICANT CHANGE UP (ref 80–100)
PLATELET # BLD AUTO: 270 K/UL — SIGNIFICANT CHANGE UP (ref 150–400)
POTASSIUM SERPL-MCNC: 4.5 MMOL/L — SIGNIFICANT CHANGE UP (ref 3.5–5.3)
POTASSIUM SERPL-SCNC: 4.5 MMOL/L — SIGNIFICANT CHANGE UP (ref 3.5–5.3)
PROTHROM AB SERPL-ACNC: 14.2 SEC — HIGH (ref 10.6–13.6)
RBC # BLD: 4.38 M/UL — SIGNIFICANT CHANGE UP (ref 4.2–5.8)
RBC # FLD: 13.1 % — SIGNIFICANT CHANGE UP (ref 10.3–14.5)
SODIUM SERPL-SCNC: 139 MMOL/L — SIGNIFICANT CHANGE UP (ref 135–145)
WBC # BLD: 7.66 K/UL — SIGNIFICANT CHANGE UP (ref 3.8–10.5)
WBC # FLD AUTO: 7.66 K/UL — SIGNIFICANT CHANGE UP (ref 3.8–10.5)

## 2021-04-29 PROCEDURE — 93010 ELECTROCARDIOGRAM REPORT: CPT | Mod: 77

## 2021-04-29 PROCEDURE — 93010 ELECTROCARDIOGRAM REPORT: CPT

## 2021-04-29 RX ORDER — DEXTROSE 50 % IN WATER 50 %
25 SYRINGE (ML) INTRAVENOUS ONCE
Refills: 0 | Status: DISCONTINUED | OUTPATIENT
Start: 2021-04-29 | End: 2021-04-30

## 2021-04-29 RX ORDER — DULOXETINE HYDROCHLORIDE 30 MG/1
20 CAPSULE, DELAYED RELEASE ORAL
Qty: 0 | Refills: 0 | DISCHARGE

## 2021-04-29 RX ORDER — METOPROLOL TARTRATE 50 MG
150 TABLET ORAL
Refills: 0 | Status: DISCONTINUED | OUTPATIENT
Start: 2021-04-29 | End: 2021-04-30

## 2021-04-29 RX ORDER — DULOXETINE HYDROCHLORIDE 30 MG/1
0 CAPSULE, DELAYED RELEASE ORAL
Qty: 0 | Refills: 0 | DISCHARGE

## 2021-04-29 RX ORDER — ASPIRIN/CALCIUM CARB/MAGNESIUM 324 MG
81 TABLET ORAL
Qty: 0 | Refills: 0 | DISCHARGE

## 2021-04-29 RX ORDER — TAMSULOSIN HYDROCHLORIDE 0.4 MG/1
0.8 CAPSULE ORAL AT BEDTIME
Refills: 0 | Status: DISCONTINUED | OUTPATIENT
Start: 2021-04-29 | End: 2021-04-30

## 2021-04-29 RX ORDER — GLUCAGON INJECTION, SOLUTION 0.5 MG/.1ML
1 INJECTION, SOLUTION SUBCUTANEOUS ONCE
Refills: 0 | Status: DISCONTINUED | OUTPATIENT
Start: 2021-04-29 | End: 2021-04-30

## 2021-04-29 RX ORDER — DEXTROSE 50 % IN WATER 50 %
15 SYRINGE (ML) INTRAVENOUS ONCE
Refills: 0 | Status: DISCONTINUED | OUTPATIENT
Start: 2021-04-29 | End: 2021-04-30

## 2021-04-29 RX ORDER — SODIUM CHLORIDE 9 MG/ML
1000 INJECTION, SOLUTION INTRAVENOUS
Refills: 0 | Status: DISCONTINUED | OUTPATIENT
Start: 2021-04-29 | End: 2021-04-30

## 2021-04-29 RX ORDER — ASPIRIN/CALCIUM CARB/MAGNESIUM 324 MG
81 TABLET ORAL DAILY
Refills: 0 | Status: DISCONTINUED | OUTPATIENT
Start: 2021-04-29 | End: 2021-04-30

## 2021-04-29 RX ORDER — INSULIN LISPRO 100/ML
VIAL (ML) SUBCUTANEOUS
Refills: 0 | Status: DISCONTINUED | OUTPATIENT
Start: 2021-04-29 | End: 2021-04-30

## 2021-04-29 RX ORDER — RIVAROXABAN 15 MG-20MG
20 KIT ORAL DAILY
Refills: 0 | Status: DISCONTINUED | OUTPATIENT
Start: 2021-04-29 | End: 2021-04-29

## 2021-04-29 RX ORDER — DEXTROSE 50 % IN WATER 50 %
12.5 SYRINGE (ML) INTRAVENOUS ONCE
Refills: 0 | Status: DISCONTINUED | OUTPATIENT
Start: 2021-04-29 | End: 2021-04-30

## 2021-04-29 RX ORDER — RIVAROXABAN 15 MG-20MG
1 KIT ORAL
Qty: 0 | Refills: 0 | DISCHARGE

## 2021-04-29 RX ORDER — ALPRAZOLAM 0.25 MG
0.25 TABLET ORAL EVERY 6 HOURS
Refills: 0 | Status: DISCONTINUED | OUTPATIENT
Start: 2021-04-29 | End: 2021-04-30

## 2021-04-29 RX ORDER — METFORMIN HYDROCHLORIDE 850 MG/1
500 TABLET ORAL
Refills: 0 | Status: DISCONTINUED | OUTPATIENT
Start: 2021-04-30 | End: 2021-04-30

## 2021-04-29 RX ORDER — ONDANSETRON 8 MG/1
4 TABLET, FILM COATED ORAL EVERY 8 HOURS
Refills: 0 | Status: DISCONTINUED | OUTPATIENT
Start: 2021-04-29 | End: 2021-04-30

## 2021-04-29 RX ORDER — DULOXETINE HYDROCHLORIDE 30 MG/1
20 CAPSULE, DELAYED RELEASE ORAL DAILY
Refills: 0 | Status: DISCONTINUED | OUTPATIENT
Start: 2021-04-29 | End: 2021-04-30

## 2021-04-29 RX ORDER — LISINOPRIL 2.5 MG/1
5 TABLET ORAL DAILY
Refills: 0 | Status: DISCONTINUED | OUTPATIENT
Start: 2021-04-29 | End: 2021-04-30

## 2021-04-29 RX ORDER — ACETAMINOPHEN 500 MG
650 TABLET ORAL EVERY 6 HOURS
Refills: 0 | Status: DISCONTINUED | OUTPATIENT
Start: 2021-04-29 | End: 2021-04-30

## 2021-04-29 RX ORDER — BENZOCAINE AND MENTHOL 5; 1 G/100ML; G/100ML
1 LIQUID ORAL
Refills: 0 | Status: DISCONTINUED | OUTPATIENT
Start: 2021-04-29 | End: 2021-04-30

## 2021-04-29 RX ORDER — MAGNESIUM SULFATE 500 MG/ML
2 VIAL (ML) INJECTION ONCE
Refills: 0 | Status: COMPLETED | OUTPATIENT
Start: 2021-04-29 | End: 2021-04-29

## 2021-04-29 RX ADMIN — Medication 81 MILLIGRAM(S): at 21:52

## 2021-04-29 RX ADMIN — DULOXETINE HYDROCHLORIDE 20 MILLIGRAM(S): 30 CAPSULE, DELAYED RELEASE ORAL at 21:52

## 2021-04-29 RX ADMIN — TAMSULOSIN HYDROCHLORIDE 0.8 MILLIGRAM(S): 0.4 CAPSULE ORAL at 21:52

## 2021-04-29 RX ADMIN — Medication 150 MILLIGRAM(S): at 23:27

## 2021-04-29 RX ADMIN — Medication 50 GRAM(S): at 20:09

## 2021-04-29 NOTE — H&P PST ADULT - NSICDXPASTSURGICALHX_GEN_ALL_CORE_FT
PAST SURGICAL HISTORY:  H/O thyroidectomy partial    History of knee replacement, total 2000, 2003    History of tonsillectomy 23 y/o    S/P wrist surgery ORIF - 02/2017     PAST SURGICAL HISTORY:  H/O thyroidectomy partial    History of knee replacement, total 2000, 2003    History of tonsillectomy 25 y/o    S/P wrist surgery ORIF - 02/2017    Status post placement of implantable loop recorder

## 2021-04-29 NOTE — DISCHARGE NOTE PROVIDER - NSDCFUSCHEDAPPT_GEN_ALL_CORE_FT
YOKASTA MCCLELLAND ; 05/04/2021 ; [facility] [address] YOKASTA Crow ; 05/13/2021 ; [facility] [address]

## 2021-04-29 NOTE — H&P PST ADULT - NSICDXFAMILYHX_GEN_ALL_CORE_FT
FAMILY HISTORY:  Father  Still living? No  Family history of heart disease, Age at diagnosis: Age Unknown    Mother  Still living? No  Family history of lung cancer, Age at diagnosis: Age Unknown    Sibling  Still living? No  Family history of cerebrovascular accident (CVA), Age at diagnosis: Age Unknown  Family history of heart disease, Age at diagnosis: Age Unknown

## 2021-04-29 NOTE — PROGRESS NOTE ADULT - SUBJECTIVE AND OBJECTIVE BOX
PROCEDURE(S): Radiofrequency Ablation of Supraventricular Tachycardia    ELECTRPHYSIOLOGIST(S): Jeri Priest MD         COMPLICATIONS:  none        DISPOSITION:  observation     CONDITION: stable      Pt doing well s/p SVT ablation via b/l FV. Hemostasis achieved via b/l figure 8 groin sutures. Denies complaint.       MEDICATIONS  (STANDING):  dextrose 40% Gel 15 Gram(s) Oral once  dextrose 5%. 1000 milliLiter(s) (50 mL/Hr) IV Continuous <Continuous>  dextrose 5%. 1000 milliLiter(s) (100 mL/Hr) IV Continuous <Continuous>  dextrose 50% Injectable 25 Gram(s) IV Push once  dextrose 50% Injectable 12.5 Gram(s) IV Push once  dextrose 50% Injectable 25 Gram(s) IV Push once  DULoxetine 20 milliGRAM(s) Oral daily  glucagon  Injectable 1 milliGRAM(s) IntraMuscular once  insulin lispro (ADMELOG) corrective regimen sliding scale   SubCutaneous three times a day before meals  lisinopril 5 milliGRAM(s) Oral daily  magnesium sulfate  IVPB 2 Gram(s) IV Intermittent once  rivaroxaban 20 milliGRAM(s) Oral daily  tamsulosin 0.8 milliGRAM(s) Oral at bedtime    MEDICATIONS  (PRN):  acetaminophen   Tablet .. 650 milliGRAM(s) Oral every 6 hours PRN Mild Pain (1 - 3)  ALPRAZolam 0.25 milliGRAM(s) Oral every 6 hours PRN insomnia/ anxiety  aluminum hydroxide/magnesium hydroxide/simethicone Suspension 30 milliLiter(s) Oral every 4 hours PRN Dyspepsia  benzocaine 15 mG/menthol 3.6 mG (Sugar-Free) Lozenge 1 Lozenge Oral every 2 hours PRN Sore Throat  ondansetron Injectable 4 milliGRAM(s) IV Push every 8 hours PRN Nausea and/or Vomiting      Allergies  No Known Allergies    Exam:   T(C): 36.6 (04-29-21 @ 11:53), Max: 36.6 (04-29-21 @ 11:53)  HR: 70 (04-29-21 @ 11:53) (70 - 70)  BP: 138/74 (04-29-21 @ 11:53) (138/74 - 138/74)  RR: 18 (04-29-21 @ 11:53) (16 - 18)  SpO2: 96% (04-29-21 @ 11:53) (96% - 96%)    Postprocedure vitals:  HR: 73  BP: 140/60  RR: 16  SpO2: !00% NRB    VSS, NAD, A&O x 3  Card: S1/S2, RRR, no m/g/r  Resp: lungs CTA b/l  Abd: S/NT/ND  Groins: hemostatic sutures in place; sites C/D/I; no bleeding, hematoma, erythema, exudate or edema  Ext: trace edema b/l; distal pulses intact    I/Os: net + 1960    ECG:    Assessment:   71 year-old man with PMH BPH, DM, HTN, HLD, SVT with recurrent episodes of syncope s/p ILR implant. Telemetry monitoring demonstrated episodes of supraventricular tachycardia. He denies chest pain, chest pressure, shortness of breath. He does report occasional lightheadedness which he associates with his arrhythmia on days which he took his meds late in the day.  EP study in 2017 demonstrated an atrial tachycardia with p wave morphology and CS consistent with left sided atrial tachycardia. AT/SVT is left sided and at times may appears as AF (wobbling cl from 360-190ms, no clear P wave) and was started on xarelto 20mg. Pt presented electively on 4/29/2021 for atrial tachycardia ablation. Cardiac CT performed on 4/9/2021 and negative for BRUCE thrombus.  Now status post uncomplicated radiofrequency ablation of SVT.      Plan:   Bedrest x 4 hours, then OOB with assistance and progress as tolerated.   Groin sutures to be removed by EP service in AM.   Radial art line to be removed once pt fully awake with stable vitals >1 hour post op.   Pending groin status: 20 mg PO Xarelto to resume at 23:00 tonight.   Lasix 10mg IV x 1 dose once ambulating, then Lasix 20mg PO daily x 3 days.   Continue ***.    Discontinue ***.   Continue other home medications.   Start Protonix 40mg twice daily x 2 weeks, then once daily x 6 weeks.   Carafate 1gm BID x 2 weeks.   Strict I/Os.  Please encourage incentive spirometry and ambulation once able.  Observation and monitoring on telemetry overnight with anticipated discharge in the AM and outpt follow up in 2-4 weeks.  PROCEDURE(S): Radiofrequency Ablation of Atrial Tachycardia    ELECTRPHYSIOLOGIST(S): Jeri Priest MD         COMPLICATIONS:  none        DISPOSITION:  observation     CONDITION: stable      Pt doing well s/p AT ablation (mid cristae terminalis and dominant APC at cranial border of appendage mouth) via b/l FV. Hemostasis achieved via b/l figure 8 groin sutures. Denies complaint.       MEDICATIONS  (STANDING):  dextrose 40% Gel 15 Gram(s) Oral once  dextrose 5%. 1000 milliLiter(s) (50 mL/Hr) IV Continuous <Continuous>  dextrose 5%. 1000 milliLiter(s) (100 mL/Hr) IV Continuous <Continuous>  dextrose 50% Injectable 25 Gram(s) IV Push once  dextrose 50% Injectable 12.5 Gram(s) IV Push once  dextrose 50% Injectable 25 Gram(s) IV Push once  DULoxetine 20 milliGRAM(s) Oral daily  glucagon  Injectable 1 milliGRAM(s) IntraMuscular once  insulin lispro (ADMELOG) corrective regimen sliding scale   SubCutaneous three times a day before meals  lisinopril 5 milliGRAM(s) Oral daily  magnesium sulfate  IVPB 2 Gram(s) IV Intermittent once  rivaroxaban 20 milliGRAM(s) Oral daily  tamsulosin 0.8 milliGRAM(s) Oral at bedtime    MEDICATIONS  (PRN):  acetaminophen   Tablet .. 650 milliGRAM(s) Oral every 6 hours PRN Mild Pain (1 - 3)  ALPRAZolam 0.25 milliGRAM(s) Oral every 6 hours PRN insomnia/ anxiety  aluminum hydroxide/magnesium hydroxide/simethicone Suspension 30 milliLiter(s) Oral every 4 hours PRN Dyspepsia  benzocaine 15 mG/menthol 3.6 mG (Sugar-Free) Lozenge 1 Lozenge Oral every 2 hours PRN Sore Throat  ondansetron Injectable 4 milliGRAM(s) IV Push every 8 hours PRN Nausea and/or Vomiting      Allergies  No Known Allergies    Exam:   T(C): 36.6 (04-29-21 @ 11:53), Max: 36.6 (04-29-21 @ 11:53)  HR: 70 (04-29-21 @ 11:53) (70 - 70)  BP: 138/74 (04-29-21 @ 11:53) (138/74 - 138/74)  RR: 18 (04-29-21 @ 11:53) (16 - 18)  SpO2: 96% (04-29-21 @ 11:53) (96% - 96%)    Postprocedure vitals:  HR: 73  BP: 140/60  RR: 16  SpO2: !00% NRB    VSS, NAD, A&O x 3  Card: S1/S2, RRR, no m/g/r  Resp: lungs CTA b/l  Abd: S/NT/ND  Groins: hemostatic sutures in place; sites C/D/I; no bleeding, hematoma, erythema, exudate or edema  Ext: trace edema b/l; distal pulses intact    I/Os: net + 1960    ECG:    Assessment:   71 year-old man with PMH BPH, DM, HTN, HLD, SVT with recurrent episodes of syncope s/p ILR implant. Telemetry monitoring demonstrated episodes of supraventricular tachycardia. He denies chest pain, chest pressure, shortness of breath. He does report occasional lightheadedness which he associates with his arrhythmia on days which he took his meds late in the day.  EP study in 2017 demonstrated an atrial tachycardia with p wave morphology and CS consistent with left sided atrial tachycardia. AT/SVT is left sided and at times may appears as AF (wobbling cl from 360-190ms, no clear P wave) and was started on xarelto 20mg. Pt presented electively on 4/29/2021 for atrial tachycardia ablation. Cardiac CT performed on 4/9/2021 and negative for BRUCE thrombus.  Now status post uncomplicated radiofrequency ablation of  AT  (mid cristae terminalis and dominant APC at cranial border of appendage mouth) via b/l FV. Hemostasis achieved via b/l figure 8 groin sutures.      Plan:   Bedrest x 4 hours, then OOB with assistance and progress as tolerated.   Groin sutures to be removed by EP service in AM.   Radial art line to be removed once pt fully awake with stable vitals >1 hour post op.   Discontinue Xarelto   Continue other home medications.   Strict I/Os.  Please encourage incentive spirometry and ambulation once able.  Observation and monitoring on telemetry overnight with anticipated discharge in the AM and outpt follow up in 2-4 weeks.  PROCEDURE(S): Radiofrequency Ablation of Atrial Tachycardia    ELECTRPHYSIOLOGIST(S): Jeri Priest MD         COMPLICATIONS:  none        DISPOSITION:  observation     CONDITION: stable      Pt doing well s/p AT ablation (mid cristae terminalis and dominant APC at cranial border of appendage mouth) via b/l FV. Hemostasis achieved via b/l figure 8 groin sutures. Denies complaint.       MEDICATIONS  (STANDING):  dextrose 40% Gel 15 Gram(s) Oral once  dextrose 5%. 1000 milliLiter(s) (50 mL/Hr) IV Continuous <Continuous>  dextrose 5%. 1000 milliLiter(s) (100 mL/Hr) IV Continuous <Continuous>  dextrose 50% Injectable 25 Gram(s) IV Push once  dextrose 50% Injectable 12.5 Gram(s) IV Push once  dextrose 50% Injectable 25 Gram(s) IV Push once  DULoxetine 20 milliGRAM(s) Oral daily  glucagon  Injectable 1 milliGRAM(s) IntraMuscular once  insulin lispro (ADMELOG) corrective regimen sliding scale   SubCutaneous three times a day before meals  lisinopril 5 milliGRAM(s) Oral daily  magnesium sulfate  IVPB 2 Gram(s) IV Intermittent once  rivaroxaban 20 milliGRAM(s) Oral daily  tamsulosin 0.8 milliGRAM(s) Oral at bedtime    MEDICATIONS  (PRN):  acetaminophen   Tablet .. 650 milliGRAM(s) Oral every 6 hours PRN Mild Pain (1 - 3)  ALPRAZolam 0.25 milliGRAM(s) Oral every 6 hours PRN insomnia/ anxiety  aluminum hydroxide/magnesium hydroxide/simethicone Suspension 30 milliLiter(s) Oral every 4 hours PRN Dyspepsia  benzocaine 15 mG/menthol 3.6 mG (Sugar-Free) Lozenge 1 Lozenge Oral every 2 hours PRN Sore Throat  ondansetron Injectable 4 milliGRAM(s) IV Push every 8 hours PRN Nausea and/or Vomiting      Allergies  No Known Allergies    Exam:   T(C): 36.6 (04-29-21 @ 11:53), Max: 36.6 (04-29-21 @ 11:53)  HR: 70 (04-29-21 @ 11:53) (70 - 70)  BP: 138/74 (04-29-21 @ 11:53) (138/74 - 138/74)  RR: 18 (04-29-21 @ 11:53) (16 - 18)  SpO2: 96% (04-29-21 @ 11:53) (96% - 96%)    Postprocedure vitals:  HR: 73  BP: 140/60  RR: 16  SpO2: !00% NRB    VSS, NAD, A&O x 3  Card: S1/S2, RRR, no m/g/r  Resp: lungs CTA b/l  Abd: S/NT/ND  Groins: hemostatic sutures in place; sites C/D/I; no bleeding, hematoma, erythema, exudate or edema  Ext: trace edema b/l; distal pulses intact    I/Os: net + 1960    ECG: SR with prolonged AV delay (VA 232ms)    Assessment:   71 year-old man with PMH BPH, DM, HTN, HLD, SVT with recurrent episodes of syncope s/p ILR implant. Telemetry monitoring demonstrated episodes of supraventricular tachycardia. He denies chest pain, chest pressure, shortness of breath. He does report occasional lightheadedness which he associates with his arrhythmia on days which he took his meds late in the day.  EP study in 2017 demonstrated an atrial tachycardia with p wave morphology and CS consistent with left sided atrial tachycardia. AT/SVT is left sided and at times may appears as AF (wobbling cl from 360-190ms, no clear P wave) and was started on xarelto 20mg. Pt presented electively on 4/29/2021 for atrial tachycardia ablation. Cardiac CT performed on 4/9/2021 and negative for BRUCE thrombus.  Now status post uncomplicated radiofrequency ablation of  AT  (mid cristae terminalis and dominant APC at cranial border of appendage mouth) via b/l FV. Hemostasis achieved via b/l figure 8 groin sutures.      Plan:   Bedrest x 4 hours, then OOB with assistance and progress as tolerated.   Groin sutures to be removed by EP service in AM.   Radial art line to be removed once pt fully awake with stable vitals >1 hour post op.   Discontinue Xarelto   Continue other home medications.   Strict I/Os.  Please encourage incentive spirometry and ambulation once able.  Observation and monitoring on telemetry overnight with anticipated discharge in the AM and outpt follow up in 2-4 weeks.  PROCEDURE(S): Radiofrequency Ablation of Atrial Tachycardia    ELECTRPHYSIOLOGIST(S): Jeri Priest MD         COMPLICATIONS:  none        DISPOSITION:  observation     CONDITION: stable      Pt doing well s/p AT ablation (mid cristae terminalis and dominant APC at cranial border of appendage mouth) via b/l FV. Hemostasis achieved via b/l figure 8 groin sutures. Denies complaint.       MEDICATIONS  (STANDING):  dextrose 40% Gel 15 Gram(s) Oral once  dextrose 5%. 1000 milliLiter(s) (50 mL/Hr) IV Continuous <Continuous>  dextrose 5%. 1000 milliLiter(s) (100 mL/Hr) IV Continuous <Continuous>  dextrose 50% Injectable 25 Gram(s) IV Push once  dextrose 50% Injectable 12.5 Gram(s) IV Push once  dextrose 50% Injectable 25 Gram(s) IV Push once  DULoxetine 20 milliGRAM(s) Oral daily  glucagon  Injectable 1 milliGRAM(s) IntraMuscular once  insulin lispro (ADMELOG) corrective regimen sliding scale   SubCutaneous three times a day before meals  lisinopril 5 milliGRAM(s) Oral daily  magnesium sulfate  IVPB 2 Gram(s) IV Intermittent once  rivaroxaban 20 milliGRAM(s) Oral daily  tamsulosin 0.8 milliGRAM(s) Oral at bedtime    MEDICATIONS  (PRN):  acetaminophen   Tablet .. 650 milliGRAM(s) Oral every 6 hours PRN Mild Pain (1 - 3)  ALPRAZolam 0.25 milliGRAM(s) Oral every 6 hours PRN insomnia/ anxiety  aluminum hydroxide/magnesium hydroxide/simethicone Suspension 30 milliLiter(s) Oral every 4 hours PRN Dyspepsia  benzocaine 15 mG/menthol 3.6 mG (Sugar-Free) Lozenge 1 Lozenge Oral every 2 hours PRN Sore Throat  ondansetron Injectable 4 milliGRAM(s) IV Push every 8 hours PRN Nausea and/or Vomiting      Allergies  No Known Allergies    Exam:   T(C): 36.6 (04-29-21 @ 11:53), Max: 36.6 (04-29-21 @ 11:53)  HR: 70 (04-29-21 @ 11:53) (70 - 70)  BP: 138/74 (04-29-21 @ 11:53) (138/74 - 138/74)  RR: 18 (04-29-21 @ 11:53) (16 - 18)  SpO2: 96% (04-29-21 @ 11:53) (96% - 96%)    Postprocedure vitals:  HR: 73  BP: 140/60  RR: 16  SpO2: !00% NRB    VSS, NAD, A&O x 3  Card: S1/S2, RRR, no m/g/r  Resp: lungs CTA b/l  Abd: S/NT/ND  Groins: hemostatic sutures in place; sites C/D/I; no bleeding, hematoma, erythema, exudate or edema  Ext: trace edema b/l; distal pulses intact    I/Os: net + 1960    ECG: SR with prolonged AV delay (AR 232ms)    Assessment:   71 year-old man with PMH BPH, DM, HTN, HLD, SVT with recurrent episodes of syncope s/p ILR implant. Telemetry monitoring demonstrated episodes of supraventricular tachycardia. He denies chest pain, chest pressure, shortness of breath. He does report occasional lightheadedness which he associates with his arrhythmia on days which he took his meds late in the day.  EP study in 2017 demonstrated an atrial tachycardia with p wave morphology and CS consistent with left sided atrial tachycardia. AT/SVT is left sided and at times may appears as AF (wobbling cl from 360-190ms, no clear P wave) and was started on xarelto 20mg. Pt presented electively on 4/29/2021 for atrial tachycardia ablation. Cardiac CT performed on 4/9/2021 and negative for BRUCE thrombus.  Now status post uncomplicated radiofrequency ablation of  AT  (mid cristae terminalis and dominant APC at cranial border of appendage mouth) via b/l FV. Hemostasis achieved via b/l figure 8 groin sutures.      Plan:   Bedrest x 4 hours, then OOB with assistance and progress as tolerated.   Groin sutures to be removed by EP service in AM.   Radial art line to be removed once pt fully awake with stable vitals >1 hour post op.   Discontinue Xarelto and start aspirin 81mg daily  Continue other home medications.   Strict I/Os.  Please encourage incentive spirometry and ambulation once able.  Observation and monitoring on telemetry overnight with anticipated discharge in the AM and outpt follow up in 2-4 weeks.

## 2021-04-29 NOTE — H&P PST ADULT - HISTORY OF PRESENT ILLNESS
71 year-old man with PMH BPH, DM, HTN, HLD, SVT with recurrent episodes of syncope. He underwent a telemetry monitoring that demonstrated episodes of supraventricular tachycardia. He denies chest pain, chest pressure, shortness of breath. He does report occasional lightheadedness which he associates with his arrhythmia on days which he took his meds late in the day.  EP study in 2017 demonstrated an atrial tachycardia with p wave morphology and CS consistent with left sided atrial tachycardia. AT/SVT is left sided and at times may appears as AF (wobbling cl from 360-190ms, no clear P wave) and was started on xarelto 20mg. Pt presents electively today for atrial tachycardia ablation. Cardiac CT performed on 2021 and negative for BRUCE thrombus.     Cardiology Summary:  Cardiac CT: 2021  VISUALIZED LUNGS: clear:  MEDIASTINUM:  no significant mediastinal adenopathy.  BONES:  grossly unremarkable  AORTA: No thoracic aortic dissection is noted in the visualized thoracic aorta.  PULMONARY ARTERIES: No pulmonary embolus is noted within the visualized central pulmonary arteries.  PERICARDIUM: normal  HEART: Normal in size.  No ASA or VSD.    CORONARY: This study was not tailored to evaluate the coronary arteries.    LEFT ATRIAL APPENDAGE:  Morphology: windsock  Size of ostium: 2.0 x 1.7cm.  Shape of ostium: oval  Complete opacification : Yes, no clot.    LEFT  Superior pulmonary vein: 2.4 x 1.6 cm  Inferior pulmonary vein measures 1.7 x 1.3 cm.    RIGHT  Superior pulmonary vein: 1.8 x 1.5 cm  Inferior pulmonary vein measures 1.8 x 1.4 cm.  Esophagus:  directly posterior to the left atrium.      EK2020, SR 60 BPM with PRWP prolonged TN   EK18, sinus rhythm 1st degree AV block otherwise normal.   Echo: 18, moderate Ao dilitation, mild concentric LVH, normal LV function, no pulmonary hypertension, enlarged LA size, mild mitral regurgitation LVEF 60%.  71 year-old man with PMH BPH, DM, HTN, HLD, SVT with recurrent episodes of syncope s/p ILR implant. Telemetry monitoring demonstrated episodes of supraventricular tachycardia. He denies chest pain, chest pressure, shortness of breath. He does report occasional lightheadedness which he associates with his arrhythmia on days which he took his meds late in the day.  EP study in 2017 demonstrated an atrial tachycardia with p wave morphology and CS consistent with left sided atrial tachycardia. AT/SVT is left sided and at times may appears as AF (wobbling cl from 360-190ms, no clear P wave) and was started on xarelto 20mg. Pt presents electively today for atrial tachycardia ablation. Cardiac CT performed on 2021 and negative for BRUCE thrombus.     Cardiology Summary:  Cardiac CT: 2021  VISUALIZED LUNGS: clear:  MEDIASTINUM:  no significant mediastinal adenopathy.  BONES:  grossly unremarkable  AORTA: No thoracic aortic dissection is noted in the visualized thoracic aorta.  PULMONARY ARTERIES: No pulmonary embolus is noted within the visualized central pulmonary arteries.  PERICARDIUM: normal  HEART: Normal in size.  No ASA or VSD.    CORONARY: This study was not tailored to evaluate the coronary arteries.    LEFT ATRIAL APPENDAGE:  Morphology: windsock  Size of ostium: 2.0 x 1.7cm.  Shape of ostium: oval  Complete opacification : Yes, no clot.    LEFT  Superior pulmonary vein: 2.4 x 1.6 cm  Inferior pulmonary vein measures 1.7 x 1.3 cm.    RIGHT  Superior pulmonary vein: 1.8 x 1.5 cm  Inferior pulmonary vein measures 1.8 x 1.4 cm.  Esophagus:  directly posterior to the left atrium.      EK2020, SR 60 BPM with PRWP prolonged FL   EK18, sinus rhythm 1st degree AV block otherwise normal.   Echo: 2020, moderate Ao dilatation, mild AR, asymmetric septal hypertrophy, normal LV systolic fcn, mild diastolic dysfunction, mild mitral regurgitation. 71 year-old man with PMH BPH, DM, HTN, HLD, SVT with recurrent episodes of syncope s/p ILR implant. Telemetry monitoring demonstrated episodes of supraventricular tachycardia. He denies chest pain, chest pressure, shortness of breath. He does report occasional lightheadedness which he associates with his arrhythmia on days which he took his meds late in the day.  EP study and in 2017 demonstrated an atrial tachycardia with p wave morphology and CS consistent with left sided atrial tachycardia. AT/SVT is left sided and at times may appears as AF (wobbling cl from 360-190ms, no clear P wave) and was started on xarelto 20mg. Pt presents electively today for atrial tachycardia ablation. Cardiac CT performed on 2021 and negative for BRUCE thrombus.     Cardiology Summary:  Cardiac CT: 2021  VISUALIZED LUNGS: clear:  MEDIASTINUM:  no significant mediastinal adenopathy.  BONES:  grossly unremarkable  AORTA: No thoracic aortic dissection is noted in the visualized thoracic aorta.  PULMONARY ARTERIES: No pulmonary embolus is noted within the visualized central pulmonary arteries.  PERICARDIUM: normal  HEART: Normal in size.  No ASA or VSD.    CORONARY: This study was not tailored to evaluate the coronary arteries.    LEFT ATRIAL APPENDAGE:  Morphology: windsock  Size of ostium: 2.0 x 1.7cm.  Shape of ostium: oval  Complete opacification : Yes, no clot.    LEFT  Superior pulmonary vein: 2.4 x 1.6 cm  Inferior pulmonary vein measures 1.7 x 1.3 cm.    RIGHT  Superior pulmonary vein: 1.8 x 1.5 cm  Inferior pulmonary vein measures 1.8 x 1.4 cm.  Esophagus:  directly posterior to the left atrium.      EK2020, SR 60 BPM with PRWP prolonged SC   EK18, sinus rhythm 1st degree AV block otherwise normal.   Echo: 2020, moderate Ao dilatation, mild AR, asymmetric septal hypertrophy, normal LV systolic fcn, mild diastolic dysfunction, mild mitral regurgitation.

## 2021-04-29 NOTE — H&P PST ADULT - COMMENTS
Denies fevers, chills, CP, palpitation, abdominal pain, N/V/D, hematuria, bloody or dark colored stools    + lightheadedness with episodes

## 2021-04-29 NOTE — H&P PST ADULT - ASSESSMENT
71 year-old man with PMH BPH, DM, HTN, HLD, SVT with recurrent episodes of syncope. He underwent a telemetry monitoring that demonstrated episodes of supraventricular tachycardia. He denies chest pain, chest pressure, shortness of breath.  EP study in 2017 demonstrated an atrial tachycardia with p wave morphology and CS consistent with left sided atrial tachycardia. AT/SVT is left sided and at times may appears as AF (wobbling cl from 360-190ms, no clear P wave) and was started on xarelto 20mg. Pt presents electively today for atrial tachycardia ablation. Cardiac CT performed on 4/9/2021 and negative for BRUCE thrombus.     Plan:  Covid negative 4/26/2021  Consent w/ Attending  Stat labs & ecg  2u PRBC on hold  NPO ***  Last xarelto **** 71 year-old man with PMH BPH, DM, HTN, HLD, SVT with recurrent episodes of syncope s/p ILR implant. Telemetry monitoring demonstrated episodes of supraventricular tachycardia. He denies chest pain, chest pressure, shortness of breath. He does report occasional lightheadedness which he associates with his arrhythmia on days which he took his meds late in the day.  EP study in 2017 demonstrated an atrial tachycardia with p wave morphology and CS consistent with left sided atrial tachycardia. AT/SVT is left sided and at times may appears as AF (wobbling cl from 360-190ms, no clear P wave) and was started on xarelto 20mg. Pt presents electively today for atrial tachycardia ablation. Cardiac CT performed on 4/9/2021 and negative for BRUCE thrombus.     Plan:  Covid negative 4/26/2021  Consent w/ Attending  Stat labs & ecg  2u PRBC on hold  NPO ***  Last xarelto **** 71 year-old man with PMH BPH, DM, HTN, HLD, SVT with recurrent episodes of syncope s/p ILR implant. Telemetry monitoring demonstrated episodes of supraventricular tachycardia. He denies chest pain, chest pressure, shortness of breath. He does report occasional lightheadedness which he associates with his arrhythmia on days which he took his meds late in the day.  EP study in 2017 demonstrated an atrial tachycardia with p wave morphology and CS consistent with left sided atrial tachycardia. AT/SVT is left sided and at times may appears as AF (wobbling cl from 360-190ms, no clear P wave) and was started on xarelto 20mg. Pt presents electively today for atrial tachycardia ablation. Cardiac CT performed on 4/9/2021 and negative for BRUCE thrombus.     Plan:  Covid negative 4/26/2021  Consent w/ Attending  Stat labs & ecg  2u PRBC on hold  NPO > 10 hours  Last xarelto yesterday morning  Last Metformin/Glipizide yesterday evening

## 2021-04-29 NOTE — H&P PST ADULT - NSICDXPASTMEDICALHX_GEN_ALL_CORE_FT
PAST MEDICAL HISTORY:  BRBPR (bright red blood per rectum)     Colonic polyp     Diabetes mellitus     GI bleed     Hemorrhoids     HTN (hypertension)     Hypercholesteremia     OA (osteoarthritis)     Obesity     SVT (supraventricular tachycardia)

## 2021-04-29 NOTE — DISCHARGE NOTE PROVIDER - NSDCCPTREATMENT_GEN_ALL_CORE_FT
PRINCIPAL PROCEDURE  Procedure: Catheter ablation, cardiac, for SVT  Findings and Treatment: - Bruising at the groin, sometimes extending down the leg, and/or a small lump under the skin at the groin access site is normal and will resolve within 2 – 3 weeks.   - Occasional skipped beats or palpitations that last for a few beats are common and generally resolve within 1-2 months.   - You may walk and take stairs at a regular pace.   - Do not perform any exercise more strenuous than walking for 1 week.   - Do not strain or lift heavy objects for 1 week.  - You may shower the day after the procedure.  - Do not soak in water (such as tub baths, hot tubs, swimming, etc.) for 1 week.   - You may resume all other activities the day after the procedure.  Call your doctor if:   - you notice bleeding, redness, drainage, swelling, increased tenderness or a hot sensation around the catheter insertion site.   - your temperature is greater than 100 degrees F for more than 24 hours.  - your rapid heart rhythm returns.  - you have any questions or concerns regarding the procedure.  If significant bleeding and/or a large lump (the size of a golf ball or bigger) occurs:  - Lie flat and apply continuous direct pressure just above the puncture site for at least 10 minutes  - If the issue resolves, notify your physician immediately.    - If the bleeding cannot be controlled, please seek immediate medical attention.  If you experience increased difficulty breathing or chest pain, or if you faint or have dizzy spells, please seek immediate medical attention.

## 2021-04-29 NOTE — DISCHARGE NOTE PROVIDER - CARE PROVIDER_API CALL
Jeri Mendieta)  Cardiac Electrophysiology; Cardiovascular Disease; Internal Medicine  [address] [place] [address]  Phone: [phone]  Fax: [phone]  Follow Up Time:

## 2021-04-29 NOTE — DISCHARGE NOTE PROVIDER - HOSPITAL COURSE
71 year-old man with PMH BPH, DM, HTN, HLD, SVT with recurrent episodes of syncope s/p ILR implant.  Telemetry monitoring demonstrated episodes of supraventricular tachycardia.  He presented electively on 4/29/21 and is now POD#1 s/p radiofrequency ablation of AT (right atrium mid cristae terminalis).  The patient was observed overnight without event and was discharged home the following morning with a plan for outpatient follow up.

## 2021-04-29 NOTE — PROGRESS NOTE ADULT - SUBJECTIVE AND OBJECTIVE BOX
Admission Criteria  Please admit the patient to the following service: CARDIOLOGY    Major Criteria:    - Continuous EKG monitoring is required for condition causing arrhythmia (hyperkalemia, etc)    - Significant volume load > 200 ml  -    Admit to: 3GUL     Patient is being admitted to the inpatient service due to high risk characteristics and need for further management/monitoring and is considered to be at a significantly increased risk of major adverse cardiac and vascular events if discharged.

## 2021-04-29 NOTE — DISCHARGE NOTE PROVIDER - NSDCMRMEDTOKEN_GEN_ALL_CORE_FT
alfuzosin 10 mg oral tablet, extended release: 1 tab(s) orally once a day  DULoxetine: 20 milligram(s) orally once a day  glipizide-metformin 2.5 mg-500 mg oral tablet: 1 tab(s) orally 2 times a day  lisinopril 5 mg oral tablet: 1 tab(s) orally once a day  metoprolol: 150 milligram(s) orally 2 times a day  Myrbetriq 25 mg oral tablet, extended release: 1 tab(s) orally once a day  sildenafil 50 mg oral tablet:   Xarelto 20 mg oral tablet: 1 tab(s) orally once a day (in the evening)   alfuzosin 10 mg oral tablet, extended release: 1 tab(s) orally once a day  aspirin 81 mg oral delayed release tablet: 1 tab(s) orally once a day  DULoxetine: 20 milligram(s) orally once a day  glipizide-metformin 2.5 mg-500 mg oral tablet: 1 tab(s) orally 2 times a day  lisinopril 5 mg oral tablet: 1 tab(s) orally once a day  metoprolol tartrate 50 mg oral tablet: 1 tab(s) orally 2 times a day  Myrbetriq 25 mg oral tablet, extended release: 1 tab(s) orally once a day  sildenafil 50 mg oral tablet:    Health Care Proxy (HCP)

## 2021-04-30 ENCOUNTER — TRANSCRIPTION ENCOUNTER (OUTPATIENT)
Age: 71
End: 2021-04-30

## 2021-04-30 VITALS — HEART RATE: 70 BPM | DIASTOLIC BLOOD PRESSURE: 79 MMHG | SYSTOLIC BLOOD PRESSURE: 126 MMHG | RESPIRATION RATE: 17 BRPM

## 2021-04-30 LAB
ANION GAP SERPL CALC-SCNC: 11 MMOL/L — SIGNIFICANT CHANGE UP (ref 5–17)
BUN SERPL-MCNC: 17 MG/DL — SIGNIFICANT CHANGE UP (ref 8–20)
CALCIUM SERPL-MCNC: 7.8 MG/DL — LOW (ref 8.6–10.2)
CHLORIDE SERPL-SCNC: 105 MMOL/L — SIGNIFICANT CHANGE UP (ref 98–107)
CO2 SERPL-SCNC: 22 MMOL/L — SIGNIFICANT CHANGE UP (ref 22–29)
COVID-19 SPIKE DOMAIN AB INTERP: POSITIVE
COVID-19 SPIKE DOMAIN ANTIBODY RESULT: >250 U/ML — HIGH
CREAT SERPL-MCNC: 0.81 MG/DL — SIGNIFICANT CHANGE UP (ref 0.5–1.3)
GLUCOSE BLDC GLUCOMTR-MCNC: 164 MG/DL — HIGH (ref 70–99)
GLUCOSE SERPL-MCNC: 192 MG/DL — HIGH (ref 70–99)
HCT VFR BLD CALC: 39.1 % — SIGNIFICANT CHANGE UP (ref 39–50)
HGB BLD-MCNC: 13.1 G/DL — SIGNIFICANT CHANGE UP (ref 13–17)
MAGNESIUM SERPL-MCNC: 2.2 MG/DL — SIGNIFICANT CHANGE UP (ref 1.6–2.6)
MCHC RBC-ENTMCNC: 32.8 PG — SIGNIFICANT CHANGE UP (ref 27–34)
MCHC RBC-ENTMCNC: 33.5 GM/DL — SIGNIFICANT CHANGE UP (ref 32–36)
MCV RBC AUTO: 98 FL — SIGNIFICANT CHANGE UP (ref 80–100)
PLATELET # BLD AUTO: 249 K/UL — SIGNIFICANT CHANGE UP (ref 150–400)
POTASSIUM SERPL-MCNC: 4.6 MMOL/L — SIGNIFICANT CHANGE UP (ref 3.5–5.3)
POTASSIUM SERPL-SCNC: 4.6 MMOL/L — SIGNIFICANT CHANGE UP (ref 3.5–5.3)
RBC # BLD: 3.99 M/UL — LOW (ref 4.2–5.8)
RBC # FLD: 13.1 % — SIGNIFICANT CHANGE UP (ref 10.3–14.5)
SARS-COV-2 IGG+IGM SERPL QL IA: >250 U/ML — HIGH
SARS-COV-2 IGG+IGM SERPL QL IA: POSITIVE
SODIUM SERPL-SCNC: 138 MMOL/L — SIGNIFICANT CHANGE UP (ref 135–145)
WBC # BLD: 6.79 K/UL — SIGNIFICANT CHANGE UP (ref 3.8–10.5)
WBC # FLD AUTO: 6.79 K/UL — SIGNIFICANT CHANGE UP (ref 3.8–10.5)

## 2021-04-30 PROCEDURE — C1889: CPT

## 2021-04-30 PROCEDURE — C1759: CPT

## 2021-04-30 PROCEDURE — 83735 ASSAY OF MAGNESIUM: CPT

## 2021-04-30 PROCEDURE — C1732: CPT

## 2021-04-30 PROCEDURE — 80048 BASIC METABOLIC PNL TOTAL CA: CPT

## 2021-04-30 PROCEDURE — 99236 HOSP IP/OBS SAME DATE HI 85: CPT

## 2021-04-30 PROCEDURE — 93623 PRGRMD STIMJ&PACG IV RX NFS: CPT

## 2021-04-30 PROCEDURE — C1894: CPT

## 2021-04-30 PROCEDURE — 82962 GLUCOSE BLOOD TEST: CPT

## 2021-04-30 PROCEDURE — C1766: CPT

## 2021-04-30 PROCEDURE — 86900 BLOOD TYPING SEROLOGIC ABO: CPT

## 2021-04-30 PROCEDURE — 93653 COMPRE EP EVAL TX SVT: CPT

## 2021-04-30 PROCEDURE — 85027 COMPLETE CBC AUTOMATED: CPT

## 2021-04-30 PROCEDURE — 36415 COLL VENOUS BLD VENIPUNCTURE: CPT

## 2021-04-30 PROCEDURE — 93662 INTRACARDIAC ECG (ICE): CPT

## 2021-04-30 PROCEDURE — 93613 INTRACARDIAC EPHYS 3D MAPG: CPT

## 2021-04-30 PROCEDURE — 93010 ELECTROCARDIOGRAM REPORT: CPT

## 2021-04-30 PROCEDURE — 85610 PROTHROMBIN TIME: CPT

## 2021-04-30 PROCEDURE — 86850 RBC ANTIBODY SCREEN: CPT

## 2021-04-30 PROCEDURE — 86901 BLOOD TYPING SEROLOGIC RH(D): CPT

## 2021-04-30 PROCEDURE — 85730 THROMBOPLASTIN TIME PARTIAL: CPT

## 2021-04-30 PROCEDURE — C1730: CPT

## 2021-04-30 PROCEDURE — 93005 ELECTROCARDIOGRAM TRACING: CPT

## 2021-04-30 PROCEDURE — 86769 SARS-COV-2 COVID-19 ANTIBODY: CPT

## 2021-04-30 RX ORDER — METOPROLOL TARTRATE 50 MG
1 TABLET ORAL
Qty: 0 | Refills: 0 | DISCHARGE
Start: 2021-04-30

## 2021-04-30 RX ORDER — METOPROLOL TARTRATE 50 MG
150 TABLET ORAL
Qty: 0 | Refills: 0 | DISCHARGE

## 2021-04-30 RX ORDER — ASPIRIN/CALCIUM CARB/MAGNESIUM 324 MG
1 TABLET ORAL
Qty: 0 | Refills: 0 | DISCHARGE
Start: 2021-04-30

## 2021-04-30 RX ADMIN — LISINOPRIL 5 MILLIGRAM(S): 2.5 TABLET ORAL at 06:07

## 2021-04-30 RX ADMIN — Medication 1: at 07:45

## 2021-04-30 NOTE — PROGRESS NOTE ADULT - ATTENDING COMMENTS
All clinical relevant information was reviewed and patient was examined. Agree with A/P as above. s/p successful AT and APC ablation

## 2021-04-30 NOTE — DISCHARGE NOTE NURSING/CASE MANAGEMENT/SOCIAL WORK - PATIENT PORTAL LINK FT
You can access the FollowMyHealth Patient Portal offered by [facility] by registering at the following website: [email or web address]. By joining [facility]’s FollowMyHealth portal, you will also be able to view your health information using other applications (apps) compatible with our system.

## 2021-04-30 NOTE — PROGRESS NOTE ADULT - SUBJECTIVE AND OBJECTIVE BOX
Pt doing well POD #1 s/p AT ablation (right atrium mid cristae terminalis).  No overnight events noted, pt denies complaint today.   Bilateral groin sutures removed without difficulty, pt tolerated well.      EKG: sinus rhythm 66 bpm, 1st degree AV block  TELE: sinus rhythm, no events noted     MEDICATIONS  (STANDING):  aspirin enteric coated 81 milliGRAM(s) Oral daily  dextrose 40% Gel 15 Gram(s) Oral once  dextrose 5%. 1000 milliLiter(s) (50 mL/Hr) IV Continuous <Continuous>  dextrose 5%. 1000 milliLiter(s) (100 mL/Hr) IV Continuous <Continuous>  dextrose 50% Injectable 25 Gram(s) IV Push once  dextrose 50% Injectable 12.5 Gram(s) IV Push once  dextrose 50% Injectable 25 Gram(s) IV Push once  DULoxetine 20 milliGRAM(s) Oral daily  glucagon  Injectable 1 milliGRAM(s) IntraMuscular once  insulin lispro (ADMELOG) corrective regimen sliding scale   SubCutaneous three times a day before meals  lisinopril 5 milliGRAM(s) Oral daily  metFORMIN 500 milliGRAM(s) Oral two times a day  metoprolol tartrate 150 milliGRAM(s) Oral two times a day  tamsulosin 0.8 milliGRAM(s) Oral at bedtime    MEDICATIONS  (PRN):  acetaminophen   Tablet .. 650 milliGRAM(s) Oral every 6 hours PRN Mild Pain (1 - 3)  ALPRAZolam 0.25 milliGRAM(s) Oral every 6 hours PRN insomnia/ anxiety  aluminum hydroxide/magnesium hydroxide/simethicone Suspension 30 milliLiter(s) Oral every 4 hours PRN Dyspepsia  benzocaine 15 mG/menthol 3.6 mG (Sugar-Free) Lozenge 1 Lozenge Oral every 2 hours PRN Sore Throat  ondansetron Injectable 4 milliGRAM(s) IV Push every 8 hours PRN Nausea and/or Vomiting    Allergies:  No Known Allergies    PAST MEDICAL & SURGICAL HISTORY:  Diabetes mellitus  OA (osteoarthritis)  Obesity  GI bleed  Hemorrhoids  Colonic polyp  BRBPR (bright red blood per rectum)  SVT (supraventricular tachycardia)  HTN (hypertension)  Hypercholesteremia  History of knee replacement, total 2000, 2003  History of tonsillectomy 25 y/o  H/O thyroidectomy partial  S/P wrist surgery ORIF - 02/2017  Status post placement of implantable loop recorder    Vital Signs Last 24 Hrs  T(C): 36.5 (30 Apr 2021 06:10), Max: 36.6 (29 Apr 2021 11:53)  T(F): 97.7 (30 Apr 2021 06:10), Max: 97.9 (29 Apr 2021 11:53)  HR: 74 (30 Apr 2021 06:10) (65 - 75)  BP: 134/76 (30 Apr 2021 06:10) (117/58 - 154/74)  BP(mean): 109 (30 Apr 2021 00:00) (84 - 109)  RR: 18 (30 Apr 2021 06:10) (16 - 19)  SpO2: 97% (29 Apr 2021 20:55) (96% - 100%)    Physical Exam:  Constitutional: NAD, AAOx3  Cardiovascular: +S1S2 RRR  Pulmonary: CTA b/l, unlabored  Abd: soft NTND +BS  Groins: C/D/I bilaterally; no bleeding, hematoma, edema  Extremities: no pedal edema, +distal pulses b/l  Neuro: non focal, ENNIS x4    LABS:                        13.1   6.79  )-----------( 249      ( 30 Apr 2021 06:20 )             39.1     04-30    138  |  105  |  17.0  ----------------------------<  192<H>  4.6   |  22.0  |  0.81    Ca    7.8<L>      30 Apr 2021 06:20  Mg     2.2     04-30    PT/INR - ( 29 Apr 2021 11:50 )   PT: 14.2 sec;   INR: 1.24 ratio       PTT - ( 29 Apr 2021 11:50 )  PTT:35.9 sec    Assessment:   71 year-old man with PMH BPH, DM, HTN, HLD, SVT with recurrent episodes of syncope s/p ILR implant.  Telemetry monitoring demonstrated episodes of supraventricular tachycardia.  He presented electively on 4/29/21 and is now POD#1 s/p radiofrequency ablation of AT (right atrium mid cristae terminalis).  Resting comfortably.      Plan:   Discontinue Xarelto  Resume aspirin  Decrease lopressor to 50mg BID   Access site care and activity limitations reviewed w/ pt.   Outpt f/up in 2-4 weeks.

## 2021-05-04 ENCOUNTER — APPOINTMENT (OUTPATIENT)
Dept: ELECTROPHYSIOLOGY | Facility: CLINIC | Age: 71
End: 2021-05-04
Payer: MEDICARE

## 2021-05-04 ENCOUNTER — NON-APPOINTMENT (OUTPATIENT)
Age: 71
End: 2021-05-04

## 2021-05-04 PROBLEM — Z92.29 COVID-19 VACCINE SERIES COMPLETED: Status: ACTIVE | Noted: 2021-04-28

## 2021-05-04 PROCEDURE — G2066: CPT

## 2021-05-04 PROCEDURE — 93298 REM INTERROG DEV EVAL SCRMS: CPT

## 2021-05-04 NOTE — PHYSICAL EXAM
[Respiratory Effort] : normal respiratory effort [No HSM] : no hepatosplenomegaly [Tender] : was nontender [Enlarged] : not enlarged [No Rash or Lesion] : No rash or lesion [Alert] : alert [Oriented to Person] : oriented to person [Oriented to Place] : oriented to place [Oriented to Time] : oriented to time [Anxious] : anxious [de-identified] : Appears well, no acute distress, ambulates easily into office and assumes examination table without need of assistance.  [de-identified] : Normocephalic and atraumatic.  [de-identified] : Supple with full range of motion.  [de-identified] : Atrial fibrillation (regular rate but irregular rhythm) [FreeTextEntry1] : No cervical, supraclavicular, axillary or inguinal adenopathy.  [de-identified] : No visible masses or palpable lesions. [de-identified] : Slightly full or obese, but soft and non tense and non tender.\par Epigastrium WNL.\par Periumbilical fascia intact.\par Right groin intact throughout inguinal, femoral and more lateral Spigelian space.\par Left groin with moderately sized inguinal hernia, soft and fully reducible.  Adjacent femoral and Spigelian spaces intact on further cough and Valsalva type maneuvers. [de-identified] : Penis free of lesions.  Cords free of cysts.  Left testicle slightly smaller and more high riding than right.  Both testicles free of masses. [de-identified] : Deferred.  [de-identified] : Grossly symmetric and within normal limits without any obvious motor or sensory deficits.  [de-identified] : though appropriately so...

## 2021-05-04 NOTE — HISTORY OF PRESENT ILLNESS
[de-identified] : Very pleasant gentleman referred by PMD with ~ 3 week history of intermittent, but persistent left groin discomfort.\par Fortunately, he is in no unique pain today.\par Unfortunately, he appreciates a sensation of awareness, "pressure" or difficulty with Valsalva type maneuvers.\par He has no obviously associated systemic or GI complaints.\par However, he is scheduled for a cardiac ablation within the next 24 hours and is therefore anticoagulated...

## 2021-05-04 NOTE — REVIEW OF SYSTEMS
[Feeling Poorly] : not feeling poorly [Feeling Tired] : not feeling tired [Palpitations] : palpitations [Arthralgias] : arthralgias [Joint Pain] : joint pain [Joint Swelling] : joint swelling [Joint Stiffness] : joint stiffness [Anxiety] : anxiety [Depression] : no depression [Negative] : Heme/Lymph [FreeTextEntry9] : "arthritis..." [de-identified] : regarding this issue and visit...

## 2021-05-04 NOTE — PLAN
[FreeTextEntry1] : Moderately sized, but symptomatic left inguinal hernia.  \par \par Given the associated discomfort, limitation in activity, persistence of symptoms and his understandable anxiety, at least consideration of operative intervention does seem indicated and appropriate.\par \par The risks, benefits and nature of the operative intervention have been reviewed at length, including but not limited to the approximate size and location of the operative incision, the planned utilization of permanent mesh, the possibility of urinary retention during the perioperative period, the possibility of infection, the possibility of chronic groin pain or perineural scarring or paresthesias or loss of sensation, the possibility of future recurrence and the likelihood of a visually noticeable “scar”/ incision with residual abdominal wall asymmetry.  He is has been offered the use of the On-Q Pain Pump, if he can be off his anticoagulant medication perioperatively.\par \par Mr. Perez understands that a period of dedicated recovery and cessation of vigorous activity will be required.  He demonstrates overall good insight and remains eager to proceed.  While he has no further specific concerns presently, I have directed him to follow-up with me and my office at any time in the interim to discuss any future questions.  He understands that any operative intervention can follow standard pre-surgical testing and medical clearance once his cardiac issues have been addressed.\par \par Please note that more than 50% of face to face time was spent in counseling and coordination of care.

## 2021-05-05 ENCOUNTER — RX RENEWAL (OUTPATIENT)
Age: 71
End: 2021-05-05

## 2021-05-13 ENCOUNTER — APPOINTMENT (OUTPATIENT)
Dept: ELECTROPHYSIOLOGY | Facility: CLINIC | Age: 71
End: 2021-05-13
Payer: MEDICARE

## 2021-05-13 VITALS
RESPIRATION RATE: 16 BRPM | SYSTOLIC BLOOD PRESSURE: 130 MMHG | HEIGHT: 71 IN | OXYGEN SATURATION: 97 % | BODY MASS INDEX: 32.62 KG/M2 | HEART RATE: 72 BPM | WEIGHT: 233 LBS | DIASTOLIC BLOOD PRESSURE: 88 MMHG

## 2021-05-13 DIAGNOSIS — R55 SYNCOPE AND COLLAPSE: ICD-10-CM

## 2021-05-13 PROCEDURE — 99215 OFFICE O/P EST HI 40 MIN: CPT

## 2021-05-13 PROCEDURE — 93285 PRGRMG DEV EVAL SCRMS IP: CPT

## 2021-05-13 PROCEDURE — 99072 ADDL SUPL MATRL&STAF TM PHE: CPT

## 2021-05-13 RX ORDER — METOPROLOL SUCCINATE 100 MG/1
100 TABLET, EXTENDED RELEASE ORAL
Qty: 270 | Refills: 1 | Status: DISCONTINUED | COMMUNITY
Start: 2021-02-10 | End: 2021-05-13

## 2021-05-13 RX ORDER — LISINOPRIL 5 MG/1
5 TABLET ORAL
Qty: 90 | Refills: 3 | Status: DISCONTINUED | COMMUNITY
Start: 2019-08-26 | End: 2021-05-13

## 2021-05-13 RX ORDER — RIVAROXABAN 20 MG/1
20 TABLET, FILM COATED ORAL
Qty: 30 | Refills: 3 | Status: DISCONTINUED | COMMUNITY
Start: 2021-03-10 | End: 2021-05-13

## 2021-06-01 PROBLEM — R55 PRE-SYNCOPE: Status: ACTIVE | Noted: 2017-06-12

## 2021-06-01 NOTE — HISTORY OF PRESENT ILLNESS
[FreeTextEntry1] : This is a 71 year old man with recurrent episodes of SVT. He is s/p ablation of a alejandro terminalis atrial tachycardia on April 29, 2021.  YOKASTA MCCLELLAND  denies chest pain, chest pressure, shortness of breath, lightheadedness, dizziness, palpitations, syncope, presyncope, orthopnea, PND, or edema.

## 2021-06-01 NOTE — ASSESSMENT
[FreeTextEntry1] : This is a 71 year old man with paroxysmal atrial tachycardia s/p ablation. ILR demonstrates no tachycardia since procedure. \par \par 1) SVT : s/p successful ablation. Will adjust beta blocker dose given fatigue. \par 2) Hypertension: BP elevated moderately. Since decreasing metoprolol will add ACEI. Will also have benefit to DM type II. \par 3) pre-sncope: no recurrent symptoms since ablation.

## 2021-06-01 NOTE — CARDIOLOGY SUMMARY
[de-identified] : 7/7/2020 : mild MR, mild , AI, mod Ao root dilatation, DHARA, Normal LV systolic function, mild diastolic dysfunction, Normal RV size and function.

## 2021-06-08 ENCOUNTER — NON-APPOINTMENT (OUTPATIENT)
Age: 71
End: 2021-06-08

## 2021-06-08 ENCOUNTER — APPOINTMENT (OUTPATIENT)
Dept: ELECTROPHYSIOLOGY | Facility: CLINIC | Age: 71
End: 2021-06-08
Payer: MEDICARE

## 2021-06-08 PROCEDURE — 93298 REM INTERROG DEV EVAL SCRMS: CPT

## 2021-06-08 PROCEDURE — G2066: CPT

## 2021-06-10 ENCOUNTER — OUTPATIENT (OUTPATIENT)
Dept: OUTPATIENT SERVICES | Facility: HOSPITAL | Age: 71
LOS: 1 days | End: 2021-06-10
Payer: MEDICARE

## 2021-06-10 VITALS
TEMPERATURE: 97 F | HEIGHT: 71 IN | HEART RATE: 68 BPM | SYSTOLIC BLOOD PRESSURE: 136 MMHG | RESPIRATION RATE: 16 BRPM | OXYGEN SATURATION: 96 % | WEIGHT: 222.67 LBS | DIASTOLIC BLOOD PRESSURE: 82 MMHG

## 2021-06-10 DIAGNOSIS — Z98.890 OTHER SPECIFIED POSTPROCEDURAL STATES: Chronic | ICD-10-CM

## 2021-06-10 DIAGNOSIS — Z95.818 PRESENCE OF OTHER CARDIAC IMPLANTS AND GRAFTS: Chronic | ICD-10-CM

## 2021-06-10 DIAGNOSIS — K40.90 UNILATERAL INGUINAL HERNIA, WITHOUT OBSTRUCTION OR GANGRENE, NOT SPECIFIED AS RECURRENT: ICD-10-CM

## 2021-06-10 DIAGNOSIS — Z01.818 ENCOUNTER FOR OTHER PREPROCEDURAL EXAMINATION: ICD-10-CM

## 2021-06-10 DIAGNOSIS — Z98.89 OTHER SPECIFIED POSTPROCEDURAL STATES: Chronic | ICD-10-CM

## 2021-06-10 DIAGNOSIS — K40.91 UNILATERAL INGUINAL HERNIA, WITHOUT OBSTRUCTION OR GANGRENE, RECURRENT: ICD-10-CM

## 2021-06-10 LAB
A1C WITH ESTIMATED AVERAGE GLUCOSE RESULT: 6 % — HIGH (ref 4–5.6)
ANION GAP SERPL CALC-SCNC: 7 MMOL/L — SIGNIFICANT CHANGE UP (ref 5–17)
BUN SERPL-MCNC: 14 MG/DL — SIGNIFICANT CHANGE UP (ref 7–23)
CALCIUM SERPL-MCNC: 9.1 MG/DL — SIGNIFICANT CHANGE UP (ref 8.4–10.5)
CHLORIDE SERPL-SCNC: 99 MMOL/L — SIGNIFICANT CHANGE UP (ref 96–108)
CO2 SERPL-SCNC: 28 MMOL/L — SIGNIFICANT CHANGE UP (ref 22–31)
CREAT SERPL-MCNC: 0.89 MG/DL — SIGNIFICANT CHANGE UP (ref 0.5–1.3)
ESTIMATED AVERAGE GLUCOSE: 126 MG/DL — HIGH (ref 68–114)
GLUCOSE SERPL-MCNC: 176 MG/DL — HIGH (ref 70–99)
HCT VFR BLD CALC: 42 % — SIGNIFICANT CHANGE UP (ref 39–50)
HGB BLD-MCNC: 14.3 G/DL — SIGNIFICANT CHANGE UP (ref 13–17)
MCHC RBC-ENTMCNC: 32.8 PG — SIGNIFICANT CHANGE UP (ref 27–34)
MCHC RBC-ENTMCNC: 34 GM/DL — SIGNIFICANT CHANGE UP (ref 32–36)
MCV RBC AUTO: 96.3 FL — SIGNIFICANT CHANGE UP (ref 80–100)
NRBC # BLD: 0 /100 WBCS — SIGNIFICANT CHANGE UP (ref 0–0)
PLATELET # BLD AUTO: 256 K/UL — SIGNIFICANT CHANGE UP (ref 150–400)
POTASSIUM SERPL-MCNC: 4.6 MMOL/L — SIGNIFICANT CHANGE UP (ref 3.5–5.3)
POTASSIUM SERPL-SCNC: 4.6 MMOL/L — SIGNIFICANT CHANGE UP (ref 3.5–5.3)
RBC # BLD: 4.36 M/UL — SIGNIFICANT CHANGE UP (ref 4.2–5.8)
RBC # FLD: 12.2 % — SIGNIFICANT CHANGE UP (ref 10.3–14.5)
SODIUM SERPL-SCNC: 134 MMOL/L — LOW (ref 135–145)
WBC # BLD: 6.82 K/UL — SIGNIFICANT CHANGE UP (ref 3.8–10.5)
WBC # FLD AUTO: 6.82 K/UL — SIGNIFICANT CHANGE UP (ref 3.8–10.5)

## 2021-06-10 PROCEDURE — 80048 BASIC METABOLIC PNL TOTAL CA: CPT

## 2021-06-10 PROCEDURE — 83036 HEMOGLOBIN GLYCOSYLATED A1C: CPT

## 2021-06-10 PROCEDURE — G0463: CPT

## 2021-06-10 PROCEDURE — 36415 COLL VENOUS BLD VENIPUNCTURE: CPT

## 2021-06-10 PROCEDURE — 85027 COMPLETE CBC AUTOMATED: CPT

## 2021-06-10 RX ORDER — DEXTROSE 50 % IN WATER 50 %
15 SYRINGE (ML) INTRAVENOUS ONCE
Refills: 0 | Status: DISCONTINUED | OUTPATIENT
Start: 2021-06-24 | End: 2021-07-08

## 2021-06-10 RX ORDER — DEXTROSE 50 % IN WATER 50 %
25 SYRINGE (ML) INTRAVENOUS ONCE
Refills: 0 | Status: DISCONTINUED | OUTPATIENT
Start: 2021-06-24 | End: 2021-07-08

## 2021-06-10 RX ORDER — GLUCAGON INJECTION, SOLUTION 0.5 MG/.1ML
1 INJECTION, SOLUTION SUBCUTANEOUS ONCE
Refills: 0 | Status: DISCONTINUED | OUTPATIENT
Start: 2021-06-24 | End: 2021-07-08

## 2021-06-10 RX ORDER — DEXTROSE 50 % IN WATER 50 %
12.5 SYRINGE (ML) INTRAVENOUS ONCE
Refills: 0 | Status: DISCONTINUED | OUTPATIENT
Start: 2021-06-24 | End: 2021-07-08

## 2021-06-10 NOTE — H&P PST ADULT - NSICDXPROBLEM_GEN_ALL_CORE_FT
PROBLEM DIAGNOSES  Problem: Hernia, inguinal, left  Assessment and Plan: OPEN REPAIR LEFT INGUINAL HERNIA  MEDICAL CLEARANCE  NOTE FROM CARDIOLOGIST

## 2021-06-10 NOTE — H&P PST ADULT - NSICDXPASTMEDICALHX_GEN_ALL_CORE_FT
PAST MEDICAL HISTORY:  BRBPR (bright red blood per rectum)     Colonic polyp     Diabetes mellitus     GI bleed     Hemorrhoids     HTN (hypertension)     Hypercholesteremia     OA (osteoarthritis)     Obesity     SVT (supraventricular tachycardia)      PAST MEDICAL HISTORY:  Diabetes mellitus does not check FBS    GI bleed treated with medication, unknown etiology    Hemorrhoids     History of BPH     HTN (hypertension)     Hypercholesteremia     OA (osteoarthritis)     SVT (supraventricular tachycardia) 2009- 4/21       PAST MEDICAL HISTORY:  2019 novel coronavirus disease (COVID-19) spring 2020, no pneumonia    COVID-19 vaccine series completed     Diabetes mellitus does not check FBS    GI bleed treated with medication, unknown etiology    Hemorrhoids     History of BPH     HTN (hypertension)     Hypercholesteremia     OA (osteoarthritis)     SVT (supraventricular tachycardia) 2009- 4/21

## 2021-06-10 NOTE — H&P PST ADULT - HISTORY OF PRESENT ILLNESS
72 yo male reports 2 month history of left inguinal hernia.   72 yo male reports 2 month history of left inguinal hernia, no precipitating event.  No c/o N/V.

## 2021-06-10 NOTE — H&P PST ADULT - NSICDXPASTSURGICALHX_GEN_ALL_CORE_FT
PAST SURGICAL HISTORY:  H/O prior ablation treatment 4/21    H/O thyroidectomy partial- remote history    History of knee replacement, total 2000, 2003    History of tonsillectomy 25 y/o    S/P wrist surgery ORIF - 02/2017    Status post placement of implantable loop recorder      PAST SURGICAL HISTORY:  H/O prior ablation treatment 4/21 for SVT    H/O thyroidectomy partial- remote history    History of knee replacement, total 2000 Right, 2003 LEFT    History of tonsillectomy 23 y/o    S/P wrist surgery ORIF - 02/2017    Status post placement of implantable loop recorder

## 2021-06-11 ENCOUNTER — RX RENEWAL (OUTPATIENT)
Age: 71
End: 2021-06-11

## 2021-06-11 ENCOUNTER — APPOINTMENT (OUTPATIENT)
Dept: INTERNAL MEDICINE | Facility: CLINIC | Age: 71
End: 2021-06-11
Payer: MEDICARE

## 2021-06-11 VITALS
WEIGHT: 226.9 LBS | HEART RATE: 79 BPM | TEMPERATURE: 98 F | HEIGHT: 71 IN | BODY MASS INDEX: 31.77 KG/M2 | OXYGEN SATURATION: 96 % | RESPIRATION RATE: 16 BRPM

## 2021-06-11 VITALS — SYSTOLIC BLOOD PRESSURE: 130 MMHG | DIASTOLIC BLOOD PRESSURE: 82 MMHG

## 2021-06-11 DIAGNOSIS — Z00.00 ENCOUNTER FOR GENERAL ADULT MEDICAL EXAMINATION W/OUT ABNORMAL FINDINGS: ICD-10-CM

## 2021-06-11 PROCEDURE — 36415 COLL VENOUS BLD VENIPUNCTURE: CPT

## 2021-06-11 PROCEDURE — 99072 ADDL SUPL MATRL&STAF TM PHE: CPT

## 2021-06-11 PROCEDURE — 99214 OFFICE O/P EST MOD 30 MIN: CPT | Mod: 25

## 2021-06-11 PROCEDURE — G0439: CPT

## 2021-06-11 NOTE — HEALTH RISK ASSESSMENT
[Yes] : Yes [3 or 4 (1 pt)] : 3 or 4  (1 point) [Never (0 pts)] : Never (0 points) [No] : In the past 12 months have you used drugs other than those required for medical reasons? No [No falls in past year] : Patient reported no falls in the past year [0] : 2) Feeling down, depressed, or hopeless: Not at all (0) [With Significant Other] : lives with significant other [Feels Safe at Home] : Feels safe at home [Fully functional (bathing, dressing, toileting, transferring, walking, feeding)] : Fully functional (bathing, dressing, toileting, transferring, walking, feeding) [Fully functional (using the telephone, shopping, preparing meals, housekeeping, doing laundry, using] : Fully functional and needs no help or supervision to perform IADLs (using the telephone, shopping, preparing meals, housekeeping, doing laundry, using transportation, managing medications and managing finances) [Reports normal functional visual acuity (ie: able to read med bottle)] : Reports normal functional visual acuity [Designated Healthcare Proxy] : Designated healthcare proxy [Name: ___] : Health Care Proxy's Name: [unfilled]  [Relationship: ___] : Relationship: [unfilled] [I will adhere to the patient's wishes as expressed in the advance directive except as noted below.] : I will adhere to the patient's wishes as expressed in the advance directive except as noted below [] : No [Audit-CScore] : 5 [[id number]] : 0 [[id number]] : 0 [Change in mental status noted] : No change in mental status noted [Language] : denies difficulty with language [Behavior] : denies difficulty with behavior [Learning/Retaining New Information] : denies difficulty learning/retaining new information [Handling Complex Tasks] : denies difficulty handling complex tasks [Reasoning] : denies difficulty with reasoning [Spatial Ability and Orientation] : denies difficulty with spatial ability and orientation [Reports changes in hearing] : Reports no changes in hearing [Reports changes in vision] : Reports no changes in vision [Reports changes in dental health] : Reports no changes in dental health [AdvancecareDate] : 06/21 [FreeTextEntry4] : Pt has proxy at home

## 2021-06-11 NOTE — HISTORY OF PRESENT ILLNESS
[de-identified] : pt comes for preop clearance and annual medicare well visit.  Pt going for hernia repair Dr Clay  Also sees Dr Lugo EPS and Dr Link cardio and Dr Pinzon urology and Dr Vanegas [facility]

## 2021-06-11 NOTE — PHYSICAL EXAM
[No Acute Distress] : no acute distress [Normal Sclera/Conjunctiva] : normal sclera/conjunctiva [Normal Outer Ear/Nose] : the outer ears and nose were normal in appearance [No JVD] : no jugular venous distention [No Respiratory Distress] : no respiratory distress  [No Accessory Muscle Use] : no accessory muscle use [Clear to Auscultation] : lungs were clear to auscultation bilaterally [Normal Rate] : normal rate  [Regular Rhythm] : with a regular rhythm [No Edema] : there was no peripheral edema [No Extremity Clubbing/Cyanosis] : no extremity clubbing/cyanosis [Comprehensive Foot Exam Normal] : Right and left foot were examined and both feet are normal. No ulcers in either foot. Toes are normal and with full ROM.  Normal tactile sensation with monofilament testing throughout both feet

## 2021-06-14 PROBLEM — U07.1 COVID-19: Chronic | Status: ACTIVE | Noted: 2021-06-10

## 2021-06-14 PROBLEM — Z92.29 PERSONAL HISTORY OF OTHER DRUG THERAPY: Chronic | Status: ACTIVE | Noted: 2021-06-10

## 2021-06-14 PROBLEM — I47.1 SUPRAVENTRICULAR TACHYCARDIA: Chronic | Status: ACTIVE | Noted: 2017-08-22

## 2021-06-14 PROBLEM — Z87.438 PERSONAL HISTORY OF OTHER DISEASES OF MALE GENITAL ORGANS: Chronic | Status: ACTIVE | Noted: 2021-06-10

## 2021-06-14 LAB
ESTIMATED AVERAGE GLUCOSE: 131 MG/DL
HBA1C MFR BLD HPLC: 6.2 %

## 2021-06-14 RX ORDER — BUPIVACAINE HCL/PF 7.5 MG/ML
100 VIAL (ML) INJECTION
Refills: 0 | Status: DISCONTINUED | OUTPATIENT
Start: 2021-06-24 | End: 2021-07-08

## 2021-06-14 NOTE — ASU DISCHARGE PLAN (ADULT/PEDIATRIC) - ACTIVITY LEVEL
No heavy lifting/No sports/gym/No tub baths No excercise/No heavy lifting/No sports/gym/No tub baths

## 2021-06-14 NOTE — ASU DISCHARGE PLAN (ADULT/PEDIATRIC) - BATHING
May shower in 24-48 hours/Shower only/Do not submerge in water May in 48 hours/Shower only/Do not submerge in water

## 2021-06-14 NOTE — ASU DISCHARGE PLAN (ADULT/PEDIATRIC) - CARE PROVIDER_API CALL
Romero Clay)  Surgery  [address], [place] [id number]  Phone: [phone]  Fax: [phone]  Follow Up Time:    Romero Clay)  Surgery  [address], [place] [id number]  Phone: [phone]  Fax: [phone]  Follow Up Time: 2 weeks

## 2021-06-14 NOTE — ASU DISCHARGE PLAN (ADULT/PEDIATRIC) - ASU DC SPECIAL INSTRUCTIONSFT
REST and relax!    Apply ice packs for 30 minutes on/30 minutes off every hour while awake for next 48 hours.  May take Tylenol for minor pain.  Please minimize any Aspirin, Advil or Motrin for the next 48 hours.  A prescription for pain medication has been forwarded to your pharmacy.  Take an over-the-counter stool softener. like COLACE. twice daily to prevent constipation.  You may shower and remove any outer dressings after 48 hours, though the Steri-Strips should remain in place.    Call for any questions or concerns!    Please remove pain pump catheter in 48 hours (when empty) and cover insertion site with bandaid. REST and relax!    Apply ice packs for 30 minutes on/ 30 minutes off every hour while awake for next 48 hours.  May take Tylenol for minor pain.    Please minimize any Aspirin, Advil or Motrin for the next 48 hours.    YOU MAY TAKE YOUR USUAL ASPIRIN.  A script for pain medication has been forwarded to your pharmacy.  Take an over the counter stool softener like COLACE twice daily until to prevent constipation.  You need to remove the outer dressing with the On-Q Pump in 48 hours.  The Steri Strips at the incision should remain in place.   Please call for any questions or concerns!

## 2021-06-15 ENCOUNTER — APPOINTMENT (OUTPATIENT)
Dept: SURGERY | Facility: CLINIC | Age: 71
End: 2021-06-15
Payer: MEDICARE

## 2021-06-15 VITALS
SYSTOLIC BLOOD PRESSURE: 130 MMHG | DIASTOLIC BLOOD PRESSURE: 80 MMHG | WEIGHT: 229.94 LBS | TEMPERATURE: 97.4 F | OXYGEN SATURATION: 98 % | HEART RATE: 74 BPM | HEIGHT: 71 IN | BODY MASS INDEX: 32.19 KG/M2

## 2021-06-15 DIAGNOSIS — Z86.03 PERSONAL HISTORY OF NEOPLASM OF UNCERTAIN BEHAVIOR: ICD-10-CM

## 2021-06-15 PROCEDURE — 10061 I&D ABSCESS COMP/MULTIPLE: CPT

## 2021-06-15 PROCEDURE — 99072 ADDL SUPL MATRL&STAF TM PHE: CPT

## 2021-06-15 NOTE — PLAN
[FreeTextEntry1] : Infected/ recurrent sebaceous cyst.  Given fluctuance, I would not wait for OR, rather plan is for drainage today.\par 1% local used for anesthetic with standard sterile prep and drape.\par Single stab incision performed with immediate return of gross seropurulent fluid and semi-solid sebum.\par Cavity explored with forceps and loculations disrupted.  Irrigated with saline and packed with iodoform gauze.\par DSD applied with all tolerated well.  Instructions reviewed in detail and supplies given:\par -leave site alone tomorrow, change outer dressing Thursday, remove packing Friday, then DSD moving forward.\par Office follow-up next Tuesday to check site.\par If all is well, then no contraindication to pending hernia repair next Thursday.\par He is pleased and agrees.  I remain available.\par Please note that more than 50% of face to face time was spent in counseling and coordination of care.

## 2021-06-15 NOTE — REVIEW OF SYSTEMS
[Feeling Poorly] : not feeling poorly [Feeling Tired] : not feeling tired [Palpitations] : palpitations [Arthralgias] : arthralgias [Joint Pain] : joint pain [Joint Swelling] : joint swelling [Joint Stiffness] : joint stiffness [As Noted in HPI] : as noted in HPI [Anxiety] : anxiety [Depression] : no depression [Negative] : Heme/Lymph [FreeTextEntry9] : "arthritis..." [de-identified] : regarding this new issue and visit...

## 2021-06-15 NOTE — HISTORY OF PRESENT ILLNESS
[de-identified] : Very pleasant gentleman referred by PMD with ~ 3 week history of intermittent, but persistent left groin discomfort.\par Fortunately, he is in no unique pain today.\par Unfortunately, he appreciates a sensation of awareness, "pressure" or difficulty with Valsalva type maneuvers.\par He has no obviously associated systemic or GI complaints.\par However, he is scheduled for a cardiac ablation within the next 24 hours and is therefore anticoagulated... [de-identified] : Very pleasant gentleman awaiting hernia surgery next week.\par Now with new issue of recurrent "cyst of upper mid back.\par He reports a prior distant office drainage.\par While the recollection has been present for some time, he now reports at least several days of progressive swelling and discomfort and today unique pain.\par Referred to office by PMD for further evaluation...

## 2021-06-15 NOTE — PHYSICAL EXAM
[Respiratory Effort] : normal respiratory effort [No HSM] : no hepatosplenomegaly [Tender] : was nontender [Enlarged] : not enlarged [Alert] : alert [Oriented to Person] : oriented to person [Oriented to Place] : oriented to place [Oriented to Time] : oriented to time [Anxious] : anxious [de-identified] : Appears well and in no acute distress, ambulates easily into office.  [de-identified] : Remains normocephalic and atraumatic.  [de-identified] : Still supple with full range of motion.  [de-identified] : Atrial fibrillation (regular rate but irregular rhythm) [FreeTextEntry1] : No cervical, supraclavicular, axillary or inguinal adenopathy today.  [de-identified] : No visible masses or palpable lesions. [de-identified] : Slightly full or obese, but soft and non tense and non tender- all stable.\par Epigastrium WNL- stable.\par Periumbilical fascia intact- stable.\par Right groin intact throughout inguinal, femoral and more lateral Spigelian space- all still stable.\par Left groin with moderately sized inguinal hernia, soft and fully reducible- stable.  \par Adjacent femoral and Spigelian spaces intact on further cough and Valsalva type maneuvers- all still stable. [de-identified] : Penis free of lesions.  Cords free of cysts.  Left testicle slightly smaller and more high riding than right.  Both testicles free of masses. [de-identified] : Deferred.  [de-identified] : Deferred.  [de-identified] : Soft, rubbery ~ 3 cm soft tissue lesion of upper middle back with gross central fluctuance and overlying erythema and induration.  Visible punctum.  All consistent with infected sebaceous cyst. [de-identified] : though less so...

## 2021-06-17 ENCOUNTER — APPOINTMENT (OUTPATIENT)
Dept: ELECTROPHYSIOLOGY | Facility: CLINIC | Age: 71
End: 2021-06-17
Payer: MEDICARE

## 2021-06-17 VITALS
WEIGHT: 222 LBS | HEIGHT: 71 IN | BODY MASS INDEX: 31.08 KG/M2 | SYSTOLIC BLOOD PRESSURE: 132 MMHG | OXYGEN SATURATION: 96 % | HEART RATE: 78 BPM | DIASTOLIC BLOOD PRESSURE: 86 MMHG

## 2021-06-17 PROCEDURE — 93285 PRGRMG DEV EVAL SCRMS IP: CPT

## 2021-06-17 PROCEDURE — 99072 ADDL SUPL MATRL&STAF TM PHE: CPT

## 2021-06-22 ENCOUNTER — APPOINTMENT (OUTPATIENT)
Dept: SURGERY | Facility: CLINIC | Age: 71
End: 2021-06-22
Payer: MEDICARE

## 2021-06-22 VITALS
HEIGHT: 71 IN | BODY MASS INDEX: 32.07 KG/M2 | OXYGEN SATURATION: 98 % | SYSTOLIC BLOOD PRESSURE: 120 MMHG | DIASTOLIC BLOOD PRESSURE: 64 MMHG | HEART RATE: 69 BPM | WEIGHT: 229.06 LBS | TEMPERATURE: 97.5 F

## 2021-06-22 PROCEDURE — 99024 POSTOP FOLLOW-UP VISIT: CPT

## 2021-06-22 NOTE — HISTORY OF PRESENT ILLNESS
[de-identified] : Very pleasant gentleman referred by PMD with ~ 3 week history of intermittent, but persistent left groin discomfort.\par Fortunately, he is in no unique pain today.\par Unfortunately, he appreciates a sensation of awareness, "pressure" or difficulty with Valsalva type maneuvers.\par He has no obviously associated systemic or GI complaints.\par However, he is scheduled for a cardiac ablation within the next 24 hours and is therefore anticoagulated... [de-identified] : Very pleasant gentleman awaiting hernia surgery later this week.\par Now s/p Incision and Drainage of infected sebaceous cyst last week.\par He is pleased with his progress and believes that he has done well.\par He denies lingering discomfort or pain.\par Mr. Perez reports removing his packing without difficulty.\par There has been no ongoing odor or drainage as per his report today...

## 2021-06-22 NOTE — REVIEW OF SYSTEMS
[Feeling Poorly] : not feeling poorly [Feeling Tired] : not feeling tired [Palpitations] : palpitations [Arthralgias] : arthralgias [Joint Pain] : joint pain [Joint Swelling] : joint swelling [Joint Stiffness] : joint stiffness [As Noted in HPI] : as noted in HPI [Anxiety] : anxiety [Depression] : no depression [Negative] : Heme/Lymph [FreeTextEntry9] : "arthritis..." [de-identified] : regarding this new issue and visit, though much less so...

## 2021-06-22 NOTE — PLAN
[FreeTextEntry1] : Infected/ recurrent sebaceous cyst.    Now s/p local drainage.\par He is progressing quite well by his history.\par Exam reassuring today.\par No systemic or local signs of sepsis/ infection.\par As such, wound gently repacked and DSD applied.\par Encouraged to call with questions or concerns or changes.\par Otherwise, packing to remain in place.\par Plan for OR later this week.\par He has no questions or concerns regarding his pending hernia repair.\par In fact, he is quite eager to proceed.  I remain available.

## 2021-06-22 NOTE — PHYSICAL EXAM
[Respiratory Effort] : normal respiratory effort [No HSM] : no hepatosplenomegaly [Tender] : was nontender [Enlarged] : not enlarged [Alert] : alert [Oriented to Person] : oriented to person [Oriented to Place] : oriented to place [Oriented to Time] : oriented to time [Anxious] : anxious [de-identified] : Appears well and in no acute distress, ambulates once again easily into office.  [de-identified] : Remains normocephalic and atraumatic today.  [de-identified] : Still supple with full range of motion.  [de-identified] : Atrial fibrillation (regular rate but irregular rhythm) [FreeTextEntry1] : No cervical, supraclavicular, axillary or inguinal adenopathy now.  [de-identified] : No visible masses or palpable lesions. [de-identified] : Slightly full or obese, but soft and non tense and non tender- all stable.\par Epigastrium WNL- stable.\par Periumbilical fascia intact- stable.\par Right groin intact throughout inguinal, femoral and more lateral Spigelian space- all still stable.\par Left groin with moderately sized inguinal hernia, soft and fully reducible- stable.  \par Adjacent femoral and Spigelian spaces intact on further cough and Valsalva type maneuvers- all still stable. [de-identified] : Deferred.  [de-identified] : Deferred.  [de-identified] : Deferred.  [de-identified] : Incision and drainage site clean and dry.  Slight superficial skin separation, but no lingering cellulitis or expressible drainage or appreciable odor, though slightest serous fluid and fibrinous exudate present within deeper SQ plain- NO RETAINED SEBUM. [de-identified] : though much less so...

## 2021-06-23 ENCOUNTER — TRANSCRIPTION ENCOUNTER (OUTPATIENT)
Age: 71
End: 2021-06-23

## 2021-06-23 NOTE — ASU PATIENT PROFILE, ADULT - PMH
2019 novel coronavirus disease (COVID-19)  spring 2020, no pneumonia  COVID-19 vaccine series completed    Diabetes mellitus  does not check FBS  GI bleed  treated with medication, unknown etiology  Hemorrhoids    History of BPH    HTN (hypertension)    Hypercholesteremia    OA (osteoarthritis)    SVT (supraventricular tachycardia)  2009- 4/21

## 2021-06-23 NOTE — ASU PATIENT PROFILE, ADULT - PSH
H/O prior ablation treatment  4/21 for SVT  H/O thyroidectomy  partial- remote history  History of knee replacement, total  2000 Right, 2003 LEFT  History of tonsillectomy  23 y/o  S/P wrist surgery  ORIF - 02/2017  Status post placement of implantable loop recorder

## 2021-06-24 ENCOUNTER — RESULT REVIEW (OUTPATIENT)
Age: 71
End: 2021-06-24

## 2021-06-24 ENCOUNTER — OUTPATIENT (OUTPATIENT)
Dept: OUTPATIENT SERVICES | Facility: HOSPITAL | Age: 71
LOS: 1 days | End: 2021-06-24
Payer: MEDICARE

## 2021-06-24 ENCOUNTER — APPOINTMENT (OUTPATIENT)
Dept: SURGERY | Facility: HOSPITAL | Age: 71
End: 2021-06-24

## 2021-06-24 VITALS
HEART RATE: 65 BPM | OXYGEN SATURATION: 95 % | RESPIRATION RATE: 18 BRPM | DIASTOLIC BLOOD PRESSURE: 76 MMHG | SYSTOLIC BLOOD PRESSURE: 122 MMHG

## 2021-06-24 VITALS
RESPIRATION RATE: 14 BRPM | DIASTOLIC BLOOD PRESSURE: 75 MMHG | SYSTOLIC BLOOD PRESSURE: 133 MMHG | HEIGHT: 71 IN | HEART RATE: 72 BPM | TEMPERATURE: 98 F | WEIGHT: 227.3 LBS | OXYGEN SATURATION: 97 %

## 2021-06-24 DIAGNOSIS — Z98.890 OTHER SPECIFIED POSTPROCEDURAL STATES: Chronic | ICD-10-CM

## 2021-06-24 DIAGNOSIS — Z95.818 PRESENCE OF OTHER CARDIAC IMPLANTS AND GRAFTS: Chronic | ICD-10-CM

## 2021-06-24 DIAGNOSIS — Z01.818 ENCOUNTER FOR OTHER PREPROCEDURAL EXAMINATION: ICD-10-CM

## 2021-06-24 DIAGNOSIS — Z98.89 OTHER SPECIFIED POSTPROCEDURAL STATES: Chronic | ICD-10-CM

## 2021-06-24 DIAGNOSIS — K40.91 UNILATERAL INGUINAL HERNIA, WITHOUT OBSTRUCTION OR GANGRENE, RECURRENT: ICD-10-CM

## 2021-06-24 LAB — GLUCOSE BLDC GLUCOMTR-MCNC: 106 MG/DL — HIGH (ref 70–99)

## 2021-06-24 PROCEDURE — 49505 PRP I/HERN INIT REDUC >5 YR: CPT | Mod: AS

## 2021-06-24 PROCEDURE — 82962 GLUCOSE BLOOD TEST: CPT

## 2021-06-24 PROCEDURE — C1781: CPT

## 2021-06-24 PROCEDURE — 88304 TISSUE EXAM BY PATHOLOGIST: CPT | Mod: 26

## 2021-06-24 PROCEDURE — 49505 PRP I/HERN INIT REDUC >5 YR: CPT | Mod: LT

## 2021-06-24 PROCEDURE — 49505 PRP I/HERN INIT REDUC >5 YR: CPT

## 2021-06-24 PROCEDURE — 88304 TISSUE EXAM BY PATHOLOGIST: CPT

## 2021-06-24 PROCEDURE — 11981 INSERTION DRUG DLVR IMPLANT: CPT

## 2021-06-24 RX ORDER — CEFAZOLIN SODIUM 1 G
2000 VIAL (EA) INJECTION ONCE
Refills: 0 | Status: COMPLETED | OUTPATIENT
Start: 2021-06-24 | End: 2021-06-24

## 2021-06-24 RX ORDER — OXYCODONE HYDROCHLORIDE 5 MG/1
1 TABLET ORAL
Qty: 30 | Refills: 0
Start: 2021-06-24

## 2021-06-24 RX ORDER — OXYCODONE HYDROCHLORIDE 5 MG/1
5 TABLET ORAL ONCE
Refills: 0 | Status: DISCONTINUED | OUTPATIENT
Start: 2021-06-24 | End: 2021-06-24

## 2021-06-24 RX ORDER — SODIUM CHLORIDE 9 MG/ML
1000 INJECTION, SOLUTION INTRAVENOUS
Refills: 0 | Status: DISCONTINUED | OUTPATIENT
Start: 2021-06-24 | End: 2021-06-24

## 2021-06-24 RX ORDER — HYDROMORPHONE HYDROCHLORIDE 2 MG/ML
0.5 INJECTION INTRAMUSCULAR; INTRAVENOUS; SUBCUTANEOUS
Refills: 0 | Status: DISCONTINUED | OUTPATIENT
Start: 2021-06-24 | End: 2021-06-24

## 2021-06-24 RX ORDER — CHLORHEXIDINE GLUCONATE 213 G/1000ML
1 SOLUTION TOPICAL ONCE
Refills: 0 | Status: COMPLETED | OUTPATIENT
Start: 2021-06-24 | End: 2021-06-24

## 2021-06-24 RX ORDER — BUPIVACAINE HCL/PF 7.5 MG/ML
1 VIAL (ML) INJECTION
Qty: 0 | Refills: 0 | DISCHARGE
Start: 2021-06-24

## 2021-06-24 RX ADMIN — Medication 2 MILLILITER(S): at 11:31

## 2021-06-24 RX ADMIN — CHLORHEXIDINE GLUCONATE 1 APPLICATION(S): 213 SOLUTION TOPICAL at 06:58

## 2021-06-24 RX ADMIN — SODIUM CHLORIDE 75 MILLILITER(S): 9 INJECTION, SOLUTION INTRAVENOUS at 11:30

## 2021-06-24 NOTE — BRIEF OPERATIVE NOTE - NSICDXBRIEFPROCEDURE_GEN_ALL_CORE_FT
PROCEDURES:  Repair, hernia, inguinal, open, using mesh, adult 24-Jun-2021 10:46:58  Romero Clay  Insertion of drug implant, non-biodegradable 24-Jun-2021 10:47:13  Romero Clay

## 2021-07-06 ENCOUNTER — APPOINTMENT (OUTPATIENT)
Dept: SURGERY | Facility: CLINIC | Age: 71
End: 2021-07-06
Payer: MEDICARE

## 2021-07-06 VITALS
SYSTOLIC BLOOD PRESSURE: 120 MMHG | DIASTOLIC BLOOD PRESSURE: 79 MMHG | WEIGHT: 229.72 LBS | BODY MASS INDEX: 32.16 KG/M2 | TEMPERATURE: 97 F | HEIGHT: 71 IN | HEART RATE: 70 BPM | OXYGEN SATURATION: 96 %

## 2021-07-06 DIAGNOSIS — K40.90 UNILATERAL INGUINAL HERNIA, W/OUT OBSTRUCTION OR GANGRENE, NOT SPECIFIED AS RECURRENT: ICD-10-CM

## 2021-07-06 DIAGNOSIS — Z87.19 PERSONAL HISTORY OF OTHER DISEASES OF THE DIGESTIVE SYSTEM: ICD-10-CM

## 2021-07-06 DIAGNOSIS — Z98.890 OTHER SPECIFIED POSTPROCEDURAL STATES: ICD-10-CM

## 2021-07-06 DIAGNOSIS — Z87.19 OTHER SPECIFIED POSTPROCEDURAL STATES: ICD-10-CM

## 2021-07-06 PROCEDURE — 99024 POSTOP FOLLOW-UP VISIT: CPT

## 2021-07-06 NOTE — REVIEW OF SYSTEMS
[Palpitations] : palpitations [Arthralgias] : arthralgias [Joint Pain] : joint pain [Joint Swelling] : joint swelling [Joint Stiffness] : joint stiffness [Negative] : Heme/Lymph [Feeling Poorly] : not feeling poorly [Feeling Tired] : not feeling tired [As Noted in HPI] : as noted in HPI [Anxiety] : no anxiety [Depression] : no depression [FreeTextEntry9] : "arthritis..."

## 2021-07-06 NOTE — PHYSICAL EXAM
[Respiratory Effort] : normal respiratory effort [No HSM] : no hepatosplenomegaly [Alert] : alert [Oriented to Person] : oriented to person [Oriented to Place] : oriented to place [Oriented to Time] : oriented to time [Tender] : was nontender [Enlarged] : not enlarged [Calm] : calm [de-identified] : Appears well and in no acute distress, ambulates once again easily into office.  [de-identified] : Remains normocephalic and atraumatic as per usual.  [de-identified] : Still supple with full range of motion on this exam.  [de-identified] : Atrial fibrillation (regular rate but irregular rhythm) [FreeTextEntry1] : No cervical, supraclavicular, axillary or inguinal adenopathy presently.  [de-identified] : No visible mass or palpable lesion. [de-identified] : Slightly full or obese.\par However, soft, non tense and non tender- all still stable.\par Epigastrium WNL- stable.\par Periumbilical fascia intact-  all stable.\par Right groin intact throughout inguinal, femoral and more lateral Spigelian hernia space- all still stable.\par Left groin with well-healing operative incision/ surgical site:\par -incision is clean and dry and intact\par -there is no expressible drainage or appreciable odor.\par -there is no overlying acute inflammatory changes or SQ collections.\par -the is some mild post-operative edema and ecchymoses.\par -however, this is well within normal limits/ expectations.\par -despite vigorous cough and Valsalva type maneuver in both upright and supine positions, the repair proves intact without compromise of adjacent femoral or Spigelian spaces. [de-identified] : Penis free of lesions.  Left cord with mild edema.  Testicles free of mass or tenderness. [de-identified] : Deferred.  [de-identified] : Deferred.  [de-identified] : Incision and drainage site clean and dry.  NO RETAINED SEBUM.  No visible/ palpable residual SQ collections.

## 2021-07-06 NOTE — PLAN
[FreeTextEntry1] : S/P uneventful general anesthesia with uncomplicated open repair of left inguinal hernia with mesh.\par Mr. MCCLELLAND is clinically well by this history and surgically stable by this examination.\par There is no evidence by history or examination to suggest complication.\par Mr. MCCLELLAND  is cleared to resume casual activities with own comfort as guide.\par To refrain from maximal exertions for another month.\par The timing and technique of scar massage reviewed in detail with Mr. MCCLELLAND .\par The Pathology report and it's benign nature was reviewed in detail.\par Mr. MCCLELLAND  has been encouraged to call with questions or concerns.  \par Otherwise, RTO in 3 to 4 months to assess resolution of soft tissue changes and assess for residual sebaceous cyst.\par Mr. MCCLELLAND is pleased and agrees, leaving in good spirits able to verbalize instructions as outlined above.

## 2021-07-06 NOTE — HISTORY OF PRESENT ILLNESS
[de-identified] : Very pleasant gentleman referred by PMD with ~ 3 week history of intermittent, but persistent left groin discomfort.\par Fortunately, he is in no unique pain today.\par Unfortunately, he appreciates a sensation of awareness, "pressure" or difficulty with Valsalva type maneuvers.\par He has no obviously associated systemic or GI complaints.\par However, he is scheduled for a cardiac ablation within the next 24 hours and is therefore anticoagulated... [de-identified] : Mr. Perez returns to the office today in excellent spirits.\par He is pleased with his overall progress and believes that he has done well.\par He denies any significant lingering discomfort or pain.\par He does report that he had some initial anxiety due to the local "swelling and bruising..."\par However, that has markedly improved and he has started to resume all of his usual activities of daily living...

## 2021-07-06 NOTE — DATA REVIEWED
[FreeTextEntry1] : YOKASTA MCCLELLAND              2\par Surgical Final Report\par Final Diagnosis\par Soft tissue, left cord, excision:\par - Mature adipose tissue consistent with lipoma.\par Verified by: MODESTO CASTILLO (Electronic Signature)\par Reported on: 06/25/21 12:30 EDT, [facility], [address] [place] [address]\par Phone: [phone]   Fax: [phone]\par _________________________________________________________________

## 2021-07-13 ENCOUNTER — APPOINTMENT (OUTPATIENT)
Dept: ELECTROPHYSIOLOGY | Facility: CLINIC | Age: 71
End: 2021-07-13
Payer: MEDICARE

## 2021-07-13 ENCOUNTER — NON-APPOINTMENT (OUTPATIENT)
Age: 71
End: 2021-07-13

## 2021-07-13 PROCEDURE — G2066: CPT

## 2021-07-13 PROCEDURE — 93298 REM INTERROG DEV EVAL SCRMS: CPT

## 2021-08-16 ENCOUNTER — APPOINTMENT (OUTPATIENT)
Dept: ELECTROPHYSIOLOGY | Facility: CLINIC | Age: 71
End: 2021-08-16
Payer: MEDICARE

## 2021-08-16 ENCOUNTER — NON-APPOINTMENT (OUTPATIENT)
Age: 71
End: 2021-08-16

## 2021-08-16 PROCEDURE — 93298 REM INTERROG DEV EVAL SCRMS: CPT

## 2021-08-16 PROCEDURE — G2066: CPT

## 2021-09-21 ENCOUNTER — NON-APPOINTMENT (OUTPATIENT)
Age: 71
End: 2021-09-21

## 2021-09-21 ENCOUNTER — APPOINTMENT (OUTPATIENT)
Dept: ELECTROPHYSIOLOGY | Facility: CLINIC | Age: 71
End: 2021-09-21
Payer: MEDICARE

## 2021-09-21 PROCEDURE — 93298 REM INTERROG DEV EVAL SCRMS: CPT

## 2021-09-21 PROCEDURE — G2066: CPT

## 2021-10-06 ENCOUNTER — APPOINTMENT (OUTPATIENT)
Dept: INTERNAL MEDICINE | Facility: CLINIC | Age: 71
End: 2021-10-06
Payer: MEDICARE

## 2021-10-06 VITALS
HEART RATE: 84 BPM | WEIGHT: 222 LBS | BODY MASS INDEX: 31.08 KG/M2 | HEIGHT: 71 IN | TEMPERATURE: 97.8 F | OXYGEN SATURATION: 98 %

## 2021-10-06 VITALS — SYSTOLIC BLOOD PRESSURE: 128 MMHG | DIASTOLIC BLOOD PRESSURE: 78 MMHG

## 2021-10-06 DIAGNOSIS — R91.1 SOLITARY PULMONARY NODULE: ICD-10-CM

## 2021-10-06 PROCEDURE — 90662 IIV NO PRSV INCREASED AG IM: CPT

## 2021-10-06 PROCEDURE — G0008: CPT

## 2021-10-06 PROCEDURE — 99214 OFFICE O/P EST MOD 30 MIN: CPT | Mod: 25

## 2021-10-06 PROCEDURE — 36415 COLL VENOUS BLD VENIPUNCTURE: CPT

## 2021-10-06 NOTE — HISTORY OF PRESENT ILLNESS
[de-identified] : Pt comes for FBW and med renewal  PT feels well  Needs flu shot  Needs ct chest for pulm nodule   Hasnt gone in yrs

## 2021-10-06 NOTE — REVIEW OF SYSTEMS
[Fever] : no fever [Chills] : no chills [Chest Pain] : no chest pain [Palpitations] : no palpitations [Lower Ext Edema] : no lower extremity edema [Shortness Of Breath] : no shortness of breath [Wheezing] : no wheezing [Cough] : no cough [Abdominal Pain] : no abdominal pain [Depression] : no depression

## 2021-10-06 NOTE — PHYSICAL EXAM
[No Acute Distress] : no acute distress [Normal Sclera/Conjunctiva] : normal sclera/conjunctiva [Normal Outer Ear/Nose] : the outer ears and nose were normal in appearance [No JVD] : no jugular venous distention [No Respiratory Distress] : no respiratory distress  [No Accessory Muscle Use] : no accessory muscle use [Clear to Auscultation] : lungs were clear to auscultation bilaterally [Normal Rate] : normal rate  [Regular Rhythm] : with a regular rhythm [No Edema] : there was no peripheral edema [No Extremity Clubbing/Cyanosis] : no extremity clubbing/cyanosis [Soft] : abdomen soft [Non Tender] : non-tender [Non-distended] : non-distended [Normal Affect] : the affect was normal

## 2021-10-07 LAB
ALBUMIN SERPL ELPH-MCNC: 4.6 G/DL
ALP BLD-CCNC: 59 U/L
ALT SERPL-CCNC: 57 U/L
ANION GAP SERPL CALC-SCNC: 14 MMOL/L
AST SERPL-CCNC: 39 U/L
BASOPHILS # BLD AUTO: 0.1 K/UL
BASOPHILS NFR BLD AUTO: 1.6 %
BILIRUB SERPL-MCNC: 0.4 MG/DL
BUN SERPL-MCNC: 11 MG/DL
CALCIUM SERPL-MCNC: 9.5 MG/DL
CHLORIDE SERPL-SCNC: 100 MMOL/L
CHOLEST SERPL-MCNC: 252 MG/DL
CO2 SERPL-SCNC: 23 MMOL/L
CREAT SERPL-MCNC: 0.74 MG/DL
EOSINOPHIL # BLD AUTO: 0.54 K/UL
EOSINOPHIL NFR BLD AUTO: 8.5 %
ESTIMATED AVERAGE GLUCOSE: 126 MG/DL
GLUCOSE SERPL-MCNC: 155 MG/DL
HBA1C MFR BLD HPLC: 6 %
HCT VFR BLD CALC: 43.5 %
HDLC SERPL-MCNC: 71 MG/DL
HGB BLD-MCNC: 14.6 G/DL
IMM GRANULOCYTES NFR BLD AUTO: 0.5 %
LDLC SERPL CALC-MCNC: 168 MG/DL
LYMPHOCYTES # BLD AUTO: 2.37 K/UL
LYMPHOCYTES NFR BLD AUTO: 37.1 %
MAN DIFF?: NORMAL
MCHC RBC-ENTMCNC: 33 PG
MCHC RBC-ENTMCNC: 33.6 GM/DL
MCV RBC AUTO: 98.2 FL
MONOCYTES # BLD AUTO: 0.59 K/UL
MONOCYTES NFR BLD AUTO: 9.2 %
NEUTROPHILS # BLD AUTO: 2.76 K/UL
NEUTROPHILS NFR BLD AUTO: 43.1 %
NONHDLC SERPL-MCNC: 180 MG/DL
PLATELET # BLD AUTO: 278 K/UL
POTASSIUM SERPL-SCNC: 4.7 MMOL/L
PROT SERPL-MCNC: 7.4 G/DL
RBC # BLD: 4.43 M/UL
RBC # FLD: 13.2 %
SODIUM SERPL-SCNC: 137 MMOL/L
TRIGL SERPL-MCNC: 63 MG/DL
TSH SERPL-ACNC: 1.51 UIU/ML
WBC # FLD AUTO: 6.39 K/UL

## 2021-10-13 LAB
CREAT SPEC-SCNC: 116 MG/DL
MICROALBUMIN 24H UR DL<=1MG/L-MCNC: <1.2 MG/DL
MICROALBUMIN/CREAT 24H UR-RTO: NORMAL MG/G

## 2021-10-19 ENCOUNTER — APPOINTMENT (OUTPATIENT)
Dept: SURGERY | Facility: CLINIC | Age: 71
End: 2021-10-19

## 2021-10-26 ENCOUNTER — RX RENEWAL (OUTPATIENT)
Age: 71
End: 2021-10-26

## 2021-10-26 ENCOUNTER — NON-APPOINTMENT (OUTPATIENT)
Age: 71
End: 2021-10-26

## 2021-10-26 ENCOUNTER — APPOINTMENT (OUTPATIENT)
Dept: ELECTROPHYSIOLOGY | Facility: CLINIC | Age: 71
End: 2021-10-26
Payer: MEDICARE

## 2021-10-26 PROCEDURE — G2066: CPT

## 2021-10-26 PROCEDURE — 93298 REM INTERROG DEV EVAL SCRMS: CPT

## 2021-11-12 ENCOUNTER — APPOINTMENT (OUTPATIENT)
Dept: SURGERY | Facility: CLINIC | Age: 71
End: 2021-11-12
Payer: MEDICARE

## 2021-11-12 VITALS
WEIGHT: 222 LBS | DIASTOLIC BLOOD PRESSURE: 90 MMHG | SYSTOLIC BLOOD PRESSURE: 122 MMHG | OXYGEN SATURATION: 93 % | BODY MASS INDEX: 31.08 KG/M2 | TEMPERATURE: 97.2 F | HEART RATE: 74 BPM | HEIGHT: 71 IN

## 2021-11-12 PROCEDURE — 99214 OFFICE O/P EST MOD 30 MIN: CPT

## 2021-11-12 RX ORDER — ASTRAGALUS ROOT 100 %
POWDER (GRAM) MISCELLANEOUS
Refills: 0 | Status: DISCONTINUED | COMMUNITY
Start: 2020-07-06 | End: 2021-11-12

## 2021-11-12 RX ORDER — BETA-GLUCAN (1,3/1,6)
POWDER (GRAM) MISCELLANEOUS
Refills: 0 | Status: DISCONTINUED | COMMUNITY
Start: 2020-07-06 | End: 2021-11-12

## 2021-11-12 NOTE — PLAN
[FreeTextEntry1] : Doing well with hernia repair, but now with concern for cyst recurrence.\par Site overall reassuring, though skin with small opening and report of drainage.\par Granulation tissue treated with silver nitrate.\par Will resume daily DSD starting tomorrow.\par Plan for office follow-up in 1 week.\par Will plan on local care for the present.\par Hopefully, this will prove sufficient for any minimal residual skin/ soft tissue disease.\par However, if not responding, may need to consider more formal excision of involved tissues in OR setting.\par Mr. Perez is pleased and agrees.\par He leaves in good spirits being able to verbalize the above.\par Please note that more than 50% of face to face time was spent in counseling and coordination of care.

## 2021-11-12 NOTE — DATA REVIEWED
[FreeTextEntry1] : YOKASTA MCCLELLAND              2\par Surgical Final Report\par Final Diagnosis\par Soft tissue, left cord, excision:\par - Mature adipose tissue consistent with lipoma.\par Verified by: MODESTO CASTILLO (Electronic Signature)\par Reported on: 06/25/21 12:30 EDT, [facility], [address] [place] [address]\par Phone: [phone]   Fax: [phone]\par _________________________________________________________________

## 2021-11-12 NOTE — REVIEW OF SYSTEMS
[Feeling Poorly] : not feeling poorly [Feeling Tired] : not feeling tired [Palpitations] : palpitations [Arthralgias] : arthralgias [Joint Swelling] : joint swelling [Joint Stiffness] : joint stiffness [As Noted in HPI] : as noted in HPI [Anxiety] : no anxiety [Depression] : no depression [Negative] : Heme/Lymph [FreeTextEntry9] : "arthritis..."

## 2021-11-12 NOTE — PHYSICAL EXAM
[Respiratory Effort] : normal respiratory effort [No HSM] : no hepatosplenomegaly [Tender] : was nontender [Enlarged] : not enlarged [Alert] : alert [Oriented to Place] : oriented to place [Oriented to Person] : oriented to person [Oriented to Time] : oriented to time [Calm] : calm [de-identified] : Appears well and in no acute distress, ambulates once again easily into the office.  [de-identified] : Remains normocephalic/ atraumatic as per usual.  [de-identified] : Still supple with full ROM on this exam.  [de-identified] : Regular rate but irregular rhythm as baseline [FreeTextEntry1] : No cervical, supraclavicular, axillary or inguinal adenopathy.  [de-identified] : No visible masses or palpable lesions. [de-identified] : Slightly full or obese.\par However, soft, non tense and non tender- all still quite stable.\par Epigastrium WNL- quite stable.\par Periumbilical fascia intact-  all quite stable.\par Right groin intact throughout inguinal, femoral and more lateral Spigelian hernia space- all still quite stable.\par Left groin with well-healing operative incision/ surgical site:\par -incision is clean, dry and intact\par -no expressible drainage or appreciable odor.\par -no overlying acute inflammatory changes or SQ collections.\par -minimal post-operative edema and no ecchymoses.\par -despite vigorous cough and Valsalva type maneuver in both upright and supine positions, the repair proves intact. [de-identified] : Penis free of lesions.  Left cord with mild edema.  Testicles free of mass or tenderness. [de-identified] : Deferred.  [de-identified] : Deferred.  [de-identified] : Prior incision and drainage site with less than 5 mm of skin separation and exposed SQ granulation tissue.  No expressible drainage.  No appreciable odor.  No acute inflammatory changes.  No obvious gross recurrence of cyst/ collection.

## 2021-11-12 NOTE — HISTORY OF PRESENT ILLNESS
[de-identified] : Very pleasant gentleman referred by PMD with ~ 3 week history of intermittent, but persistent left groin discomfort.\par Fortunately, he is in no unique pain today.\par Unfortunately, he appreciates a sensation of awareness, "pressure" or difficulty with Valsalva type maneuvers.\par He has no obviously associated systemic or GI complaints.\par However, he is scheduled for a cardiac ablation within the next 24 hours and is therefore anticoagulated... [de-identified] : Mr. Perez returns to the office today in excellent spirits, though somewhat frustrated with "more drainage last couple of days" from prior I & D site.\par He is pleased with his overall progress and believes that he has done well following his hernia repair.\par He denies any significant lingering discomfort or pain regarding that procedure.\par He has not experienced any fever or chills.\par He cannot see the cyst site, but has kept it covered with a simple band aid dressing...

## 2021-11-18 ENCOUNTER — APPOINTMENT (OUTPATIENT)
Dept: ELECTROPHYSIOLOGY | Facility: CLINIC | Age: 71
End: 2021-11-18
Payer: MEDICARE

## 2021-11-18 VITALS
SYSTOLIC BLOOD PRESSURE: 124 MMHG | DIASTOLIC BLOOD PRESSURE: 82 MMHG | OXYGEN SATURATION: 97 % | BODY MASS INDEX: 31.22 KG/M2 | HEIGHT: 71 IN | HEART RATE: 87 BPM | WEIGHT: 223 LBS

## 2021-11-18 PROCEDURE — 99214 OFFICE O/P EST MOD 30 MIN: CPT

## 2021-11-18 PROCEDURE — 93285 PRGRMG DEV EVAL SCRMS IP: CPT

## 2021-11-19 ENCOUNTER — APPOINTMENT (OUTPATIENT)
Dept: SURGERY | Facility: CLINIC | Age: 71
End: 2021-11-19
Payer: MEDICARE

## 2021-11-19 VITALS
OXYGEN SATURATION: 96 % | HEIGHT: 71 IN | HEART RATE: 89 BPM | SYSTOLIC BLOOD PRESSURE: 125 MMHG | BODY MASS INDEX: 31.98 KG/M2 | TEMPERATURE: 96.7 F | WEIGHT: 228.4 LBS | DIASTOLIC BLOOD PRESSURE: 84 MMHG

## 2021-11-19 PROCEDURE — 99214 OFFICE O/P EST MOD 30 MIN: CPT

## 2021-11-23 NOTE — PHYSICAL EXAM
[Respiratory Effort] : normal respiratory effort [No HSM] : no hepatosplenomegaly [Alert] : alert [Oriented to Person] : oriented to person [Oriented to Place] : oriented to place [Oriented to Time] : oriented to time [Calm] : calm [Tender] : was nontender [Enlarged] : not enlarged [de-identified] : Appears well and in no acute distress, ambulates once again easily into office.  [de-identified] : Remains normocephalic/ atraumatic as per usual.  [de-identified] : Still supple with full ROM.  [de-identified] : Regular rate but irregular rhythm. [FreeTextEntry1] : No cervical, supraclavicular, axillary or inguinal adenopathy.  [de-identified] : No visible mass or palpable lesion. [de-identified] : Slightly full or obese.\par However, soft, non tense and non tender- all still quite stable.\par Epigastrium WNL- quite stable.\par Periumbilical fascia intact-  all quite stable.\par Right groin intact throughout inguinal, femoral and more lateral Spigelian hernia space- all still quite stable.\par Left groin with well-healing operative incision/ surgical site:\par -incision is clean, dry and intact\par -no expressible drainage or appreciable odor.\par -no overlying acute inflammatory changes or SQ collections.\par -post-operative edema and ecchymoses resolved.\par -despite vigorous cough and Valsalva type maneuver in both upright and supine positions, his repair is intact. [de-identified] : Penis free of lesions.  Left cord with resolved edema.  Testicles free of mass or tenderness. [de-identified] : Deferred.  [de-identified] : Deferred.  [de-identified] : Prior incision and drainage site with ~ 3 mm of skin separation and exposed SQ granulation tissue.  One "drop" of expressed sebum on my manipulations.  No appreciable odor.  No acute inflammatory changes.

## 2021-11-23 NOTE — HISTORY OF PRESENT ILLNESS
[de-identified] : Very pleasant gentleman referred by PMD with ~ 3 week history of intermittent, but persistent left groin discomfort.\par Fortunately, he is in no unique pain today.\par Unfortunately, he appreciates a sensation of awareness, "pressure" or difficulty with Valsalva type maneuvers.\par He has no obviously associated systemic or GI complaints.\par However, he is scheduled for a cardiac ablation within the next 24 hours and is therefore anticoagulated... [de-identified] : Mr. Perez returns to the office today regarding prior I & D site.\par He is pleased with his overall progress and believes that he has done well following the recent hernia repair.\par Mr. Perez is covering the incision and drainage site with a simple band aid.\par He denies any problems following last visit and application of silver nitrate.\par Mr. Perez cannot see the cyst site, but denies unique pain and report only a "drop or two of clear drainage..."

## 2021-11-23 NOTE — REVIEW OF SYSTEMS
[Palpitations] : palpitations [Arthralgias] : arthralgias [Joint Swelling] : joint swelling [Joint Stiffness] : joint stiffness [As Noted in HPI] : as noted in HPI [Negative] : Heme/Lymph [Feeling Poorly] : not feeling poorly [Feeling Tired] : not feeling tired [Anxiety] : no anxiety [Depression] : no depression [FreeTextEntry9] : "arthritis..."

## 2021-11-23 NOTE — DATA REVIEWED
[FreeTextEntry1] : YOKASTA MCCLELLAND              2\par Surgical Final Report\par Final Diagnosis\par Soft tissue, left cord, excision:\par - Mature adipose tissue consistent with lipoma.\par Verified by: MODESTO CASITLLO (Electronic Signature)\par Reported on: 06/25/21 12:30 EDT, [facility], [address] [place] [address]\par Phone: [phone]   Fax: [phone]\par _________________________________________________________________

## 2021-11-23 NOTE — PLAN
[FreeTextEntry1] : Doing well with hernia repair, but now with concern for cyst/ recurrence.\par Site overall reassuring, though skin with small opening and single "drop" of expressed sebum today.\par Granulation tissue treated again with silver nitrate.\par Will continue daily DSD/ Band Aid.\par Plan for office follow-up in 1 to 2 weeks.\par Will plan on continued local care for the present.\par Hopefully, this will prove sufficient for any minimal residual skin/ soft tissue disease.\par However, if not responding, he understands the need formal excision of involved tissues in OR setting.\par Mr. Perez is pleased and agrees after a discussion of the R/B/N of this.\par He leaves in good spirits being able to verbalize the above.\par Please note that more than 50% of face to face time was spent in counseling and coordination of care.

## 2021-12-02 ENCOUNTER — APPOINTMENT (OUTPATIENT)
Dept: ELECTROPHYSIOLOGY | Facility: CLINIC | Age: 71
End: 2021-12-02
Payer: MEDICARE

## 2021-12-03 ENCOUNTER — NON-APPOINTMENT (OUTPATIENT)
Age: 71
End: 2021-12-03

## 2021-12-03 PROCEDURE — 93298 REM INTERROG DEV EVAL SCRMS: CPT

## 2021-12-03 PROCEDURE — G2066: CPT

## 2021-12-08 ENCOUNTER — APPOINTMENT (OUTPATIENT)
Dept: SURGERY | Facility: CLINIC | Age: 71
End: 2021-12-08
Payer: MEDICARE

## 2021-12-08 VITALS
HEART RATE: 72 BPM | TEMPERATURE: 96.7 F | OXYGEN SATURATION: 94 % | HEIGHT: 71 IN | BODY MASS INDEX: 32.28 KG/M2 | DIASTOLIC BLOOD PRESSURE: 81 MMHG | SYSTOLIC BLOOD PRESSURE: 124 MMHG | WEIGHT: 230.6 LBS

## 2021-12-08 PROCEDURE — 99214 OFFICE O/P EST MOD 30 MIN: CPT

## 2021-12-08 NOTE — PLAN
[FreeTextEntry1] : Concerns persist for cyst/ recurrence.\par Site overall reassuring again today, though skin with small residual opening and intermittent serous drainage.\par Granulation tissue treated again with silver nitrate and tolerated well.\par Will continue daily Band Aid.\par Plan for office follow-up in 1 or 2 weeks.\par Will plan on continuing local care for the present as slow progress is being made, though skin not entirely closed.\par Hopefully, this will indeed prove sufficient for any minimal residual skin/ soft tissue disease.\par However, if ultimately not responding, he understands the need formal excision of involved tissues in OR setting.\par Mr. Perez is pleased and remains agreeable after a discussion of the R/B/N of this once again.\par He leaves the office in good spirits being able to verbalize the above.\par Please note that more than 50% of face to face time was spent in counseling and coordination of care.

## 2021-12-08 NOTE — HISTORY OF PRESENT ILLNESS
[de-identified] : Very pleasant gentleman referred by PMD with ~ 3 week history of intermittent, but persistent left groin discomfort.\par Fortunately, he is in no unique pain today.\par Unfortunately, he appreciates a sensation of awareness, "pressure" or difficulty with Valsalva type maneuvers.\par He has no obviously associated systemic or GI complaints.\par However, he is scheduled for a cardiac ablation within the next 24 hours and is therefore anticoagulated... [de-identified] : Mr. Perez returns to the office today in regards to his prior I & D/ cyst site.\par He is somewhat frustrated with his slow, though continued progress with slowing, but still intermittent serous drainage on his band aid dressing.\par He denies any problems following last visit and the application of silver nitrate.\par He is pleased that there is no discomfort or pain.\par However, is now anxious that he will be traveling out of the [place] in February for a month+ and he is hoping for resolution of this process prior to that time...

## 2021-12-08 NOTE — DATA REVIEWED
[No studies available for review at this time.] : No studies available for review at this time. [FreeTextEntry1] : No new studies since last visit...

## 2021-12-08 NOTE — REVIEW OF SYSTEMS
[Feeling Poorly] : not feeling poorly [Feeling Tired] : not feeling tired [Palpitations] : palpitations [Arthralgias] : arthralgias [Joint Swelling] : joint swelling [Joint Stiffness] : joint stiffness [As Noted in HPI] : as noted in HPI [Anxiety] : anxiety [Depression] : no depression [Negative] : Heme/Lymph [FreeTextEntry9] : "arthritis..." [de-identified] : regarding this issue and visit...

## 2021-12-08 NOTE — PHYSICAL EXAM
[Respiratory Effort] : normal respiratory effort [No HSM] : no hepatosplenomegaly [Tender] : was nontender [Enlarged] : not enlarged [Alert] : alert [Oriented to Person] : oriented to person [Oriented to Place] : oriented to place [Oriented to Time] : oriented to time [Anxious] : anxious [de-identified] : Appears well and in no acute distress, ambulates once again easily into the office.  [de-identified] : Remains normocephalic/ atraumatic as per his usual exam.  [de-identified] : Still supple with full ROM today.  [de-identified] : Regular rate but irregular rhythm. [FreeTextEntry1] : No cervical, supraclavicular or axillary adenopathy.  [de-identified] : No visible masses or palpable lesions. [de-identified] : Slightly full or obese- stable\par However, soft, non tense and non tender- all still quite stable.\par Epigastrium WNL- quite stable.\par Periumbilical fascia intact-  all quite stable.\par Right groin intact throughout inguinal, femoral and more lateral Spigelian hernia space- all still quite stable.\par Left groin with well-healing operative incision/ surgical site:\par -incision is clean, dry and intact\par -no expressible drainage or appreciable odor.\par -no overlying acute inflammatory changes or SQ collections.\par -post-operative edema and ecchymoses resolved.\par -despite vigorous cough and Valsalva type maneuver in both upright and supine positions, the repair is intact. [de-identified] : Deferred.  [de-identified] : Deferred.  [de-identified] : Deferred.  [de-identified] : Prior incision and drainage site with ~ 2 to 3 mm of skin separation and exposed SQ granulation tissue.  No sebum expressed on my manipulations.  No appreciable odor.  No acute inflammatory changes, though some mild edema present/ appreciated without unique collection. [de-identified] : though appropriately so...

## 2021-12-15 ENCOUNTER — APPOINTMENT (OUTPATIENT)
Dept: SURGERY | Facility: CLINIC | Age: 71
End: 2021-12-15

## 2021-12-15 VITALS
DIASTOLIC BLOOD PRESSURE: 78 MMHG | SYSTOLIC BLOOD PRESSURE: 120 MMHG | TEMPERATURE: 96.7 F | BODY MASS INDEX: 32.28 KG/M2 | WEIGHT: 230.6 LBS | HEIGHT: 71 IN | OXYGEN SATURATION: 94 % | HEART RATE: 77 BPM

## 2021-12-16 ENCOUNTER — APPOINTMENT (OUTPATIENT)
Dept: SURGERY | Facility: CLINIC | Age: 71
End: 2021-12-16
Payer: MEDICARE

## 2021-12-16 VITALS
OXYGEN SATURATION: 95 % | HEIGHT: 71 IN | TEMPERATURE: 96.7 F | BODY MASS INDEX: 32.2 KG/M2 | SYSTOLIC BLOOD PRESSURE: 120 MMHG | WEIGHT: 230 LBS | DIASTOLIC BLOOD PRESSURE: 74 MMHG | HEART RATE: 78 BPM

## 2021-12-16 PROCEDURE — 99214 OFFICE O/P EST MOD 30 MIN: CPT

## 2021-12-16 NOTE — HISTORY OF PRESENT ILLNESS
[de-identified] : Very pleasant gentleman referred by PMD with ~ 3 week history of intermittent, but persistent left groin discomfort.\par Fortunately, he is in no unique pain today.\par Unfortunately, he appreciates a sensation of awareness, "pressure" or difficulty with Valsalva type maneuvers.\par He has no obviously associated systemic or GI complaints.\par However, he is scheduled for a cardiac ablation within the next 24 hours and is therefore anticoagulated... [de-identified] : Mr. Perez returns to the office today somewhat frustrated regarding his prior I & D/ sebaceous cyst site.\par He is somewhat frustrated with the resumption of now still intermittent serous drainage on his band aid dressing and some bleeding the last 24 hours.\par He denies any discomfort or pain today.\par He is now eager to proceed with intervention given the chronic nature of this issue and well as the acuity of this newest episode.\par He anxious to be [place] out of the [place] in late January/ early February and is hopeful for resolution prior to that time...

## 2021-12-16 NOTE — REVIEW OF SYSTEMS
[Palpitations] : palpitations [Arthralgias] : arthralgias [Joint Swelling] : joint swelling [Joint Stiffness] : joint stiffness [As Noted in HPI] : as noted in HPI [Anxiety] : anxiety [Negative] : Heme/Lymph [Feeling Poorly] : not feeling poorly [Feeling Tired] : not feeling tired [Depression] : no depression [FreeTextEntry9] : "arthritis..." [de-identified] : regarding this issue and visit...

## 2021-12-16 NOTE — PHYSICAL EXAM
[Respiratory Effort] : normal respiratory effort [No HSM] : no hepatosplenomegaly [Alert] : alert [Oriented to Person] : oriented to person [Oriented to Place] : oriented to place [Oriented to Time] : oriented to time [Anxious] : anxious [Tender] : was nontender [Enlarged] : not enlarged [de-identified] : Appears well and in no acute distress, ambulates once again easily into office.  [de-identified] : Remains normocephalic and atraumatic as per usual.  [de-identified] : Still supple with full ROM.  [de-identified] : Regular rate but regular rhythm. [FreeTextEntry1] : No appreciable cervical, supraclavicular or axillary adenopathy.  [de-identified] : No visible mass or palpable lesion. [de-identified] : Slightly full or obese- stable\par However, soft, non tense and non tender- all still quite stable.\par  [de-identified] : Deferred.  [de-identified] : Deferred.  [de-identified] : Deferred.  [de-identified] : Prior incision and drainage site with persistent ~ 2 to 3 mm of skin separation and exposed SQ granulation tissue.  Some expressed sebum once again today on my manipulations, though no purulence or appreciable odor.  No acute inflammatory changes, though some more edema present and somewhat tender to my manipulation without actual cellulitis [de-identified] : though appropriately so...

## 2021-12-16 NOTE — DATA REVIEWED
[No studies available for review at this time.] : No studies available for review at this time. [FreeTextEntry1] : No new studies...

## 2021-12-16 NOTE — PLAN
[FreeTextEntry1] : Concerns more persistent for cyst recurrence despite prior local drainage and aggressive local care.\par Site more concerning today with not only intermittent serous drainage but more expressible sebum.\par Supplies given for ongoing local care with [facility].\par Given persistence, need formal excision of involved tissues in OR setting seems apparent.\par Mr. Perez is pleased and remains agreeable after a discussion of the R/B/N of this once again.\par Given acute on chronic nature of this, plan is for en block resection of tract/ scarring/ cyst- all involved tissues.\par He is already aware of bleeding/ infection/ scarring/ cyst recurrence.\par In an effort to close primarily, will ask for Plastic Surgery assistance as defect of 4 to 5 + cm very likely.\par He leaves the office in good spirits being able to verbalize the issues and plan as outlined above.\par Please note that more than 50% of face to face time was spent in counseling and coordination of care.

## 2021-12-20 ENCOUNTER — APPOINTMENT (OUTPATIENT)
Dept: PLASTIC SURGERY | Facility: CLINIC | Age: 71
End: 2021-12-20
Payer: MEDICARE

## 2021-12-20 VITALS
BODY MASS INDEX: 30.8 KG/M2 | WEIGHT: 220 LBS | TEMPERATURE: 97.7 F | OXYGEN SATURATION: 97 % | HEART RATE: 82 BPM | DIASTOLIC BLOOD PRESSURE: 88 MMHG | SYSTOLIC BLOOD PRESSURE: 153 MMHG | HEIGHT: 71 IN

## 2021-12-20 PROCEDURE — 99202 OFFICE O/P NEW SF 15 MIN: CPT

## 2021-12-20 NOTE — HISTORY OF PRESENT ILLNESS
[FreeTextEntry1] : YOKASTA MCCLELLAND is a 71 year M who presents with a chronic draining lesion of his mid-back, likely cystic, has been treated with I&D and local wound care by Dr. Clay. Now scheduled to undergo wide local excision, which will require plastic surgery reconstruction. \par \par Patient states that the cyst has been there for many years, often drains, and has treated with packing. \par He owns a restaurant. \par \par PMH includes tachycardia, HTN, DM, remote former smoker.

## 2021-12-20 NOTE — PHYSICAL EXAM
[NI] : Normal [de-identified] : NAD, AxOx3 [de-identified] : small, <1cm open wound of lower back with scant drainage\par local induration and chronic skin changes and discoloration

## 2021-12-20 NOTE — REVIEW OF SYSTEMS
[As Noted in HPI] : as noted in HPI [Negative] : Psychiatric [Fever] : no fever [Chills] : no chills

## 2021-12-20 NOTE — CARDIOLOGY SUMMARY
[de-identified] : 7/7/2020 : mild MR, mild , AI, mod Ao root dilatation, DHARA, Normal LV systolic function, mild diastolic dysfunction, Normal RV size and function.

## 2021-12-20 NOTE — ASSESSMENT
[FreeTextEntry1] : This is a 71 year old man with paroxysmal atrial tachycardia s/p ablation. ILR demonstrates no tachycardia since procedure. \par \par 1) SVT : s/p successful ablation. Fatigue improved on lower dose of Metoprolol Succinate. \par 2) Hypertension: BP Controlled on Metoprolol 50 mg  and Lisinopril 10 mg BID will continue at this dose. \par 3) pre-sncope: no recurrent symptoms since ablation. \par \par TSH, CMP, CBC from 10/6 reviewed.

## 2021-12-22 ENCOUNTER — OUTPATIENT (OUTPATIENT)
Dept: OUTPATIENT SERVICES | Facility: HOSPITAL | Age: 71
LOS: 1 days | End: 2021-12-22
Payer: MEDICARE

## 2021-12-22 VITALS
DIASTOLIC BLOOD PRESSURE: 99 MMHG | HEART RATE: 86 BPM | OXYGEN SATURATION: 98 % | WEIGHT: 223.11 LBS | SYSTOLIC BLOOD PRESSURE: 153 MMHG | RESPIRATION RATE: 16 BRPM | HEIGHT: 70 IN | TEMPERATURE: 98 F

## 2021-12-22 DIAGNOSIS — Z98.89 OTHER SPECIFIED POSTPROCEDURAL STATES: Chronic | ICD-10-CM

## 2021-12-22 DIAGNOSIS — Z01.818 ENCOUNTER FOR OTHER PREPROCEDURAL EXAMINATION: ICD-10-CM

## 2021-12-22 DIAGNOSIS — L72.3 SEBACEOUS CYST: ICD-10-CM

## 2021-12-22 DIAGNOSIS — Z98.890 OTHER SPECIFIED POSTPROCEDURAL STATES: Chronic | ICD-10-CM

## 2021-12-22 DIAGNOSIS — Z95.818 PRESENCE OF OTHER CARDIAC IMPLANTS AND GRAFTS: Chronic | ICD-10-CM

## 2021-12-22 DIAGNOSIS — D48.1 NEOPLASM OF UNCERTAIN BEHAVIOR OF CONNECTIVE AND OTHER SOFT TISSUE: ICD-10-CM

## 2021-12-22 LAB
ANION GAP SERPL CALC-SCNC: 8 MMOL/L — SIGNIFICANT CHANGE UP (ref 5–17)
BUN SERPL-MCNC: 13 MG/DL — SIGNIFICANT CHANGE UP (ref 7–23)
CALCIUM SERPL-MCNC: 9.1 MG/DL — SIGNIFICANT CHANGE UP (ref 8.4–10.5)
CHLORIDE SERPL-SCNC: 101 MMOL/L — SIGNIFICANT CHANGE UP (ref 96–108)
CO2 SERPL-SCNC: 28 MMOL/L — SIGNIFICANT CHANGE UP (ref 22–31)
CREAT SERPL-MCNC: 0.83 MG/DL — SIGNIFICANT CHANGE UP (ref 0.5–1.3)
GLUCOSE SERPL-MCNC: 150 MG/DL — HIGH (ref 70–99)
HCT VFR BLD CALC: 43.3 % — SIGNIFICANT CHANGE UP (ref 39–50)
HGB BLD-MCNC: 14.6 G/DL — SIGNIFICANT CHANGE UP (ref 13–17)
MCHC RBC-ENTMCNC: 31.9 PG — SIGNIFICANT CHANGE UP (ref 27–34)
MCHC RBC-ENTMCNC: 33.7 GM/DL — SIGNIFICANT CHANGE UP (ref 32–36)
MCV RBC AUTO: 94.5 FL — SIGNIFICANT CHANGE UP (ref 80–100)
NRBC # BLD: 0 /100 WBCS — SIGNIFICANT CHANGE UP (ref 0–0)
PLATELET # BLD AUTO: 266 K/UL — SIGNIFICANT CHANGE UP (ref 150–400)
POTASSIUM SERPL-MCNC: 5 MMOL/L — SIGNIFICANT CHANGE UP (ref 3.5–5.3)
POTASSIUM SERPL-SCNC: 5 MMOL/L — SIGNIFICANT CHANGE UP (ref 3.5–5.3)
RBC # BLD: 4.58 M/UL — SIGNIFICANT CHANGE UP (ref 4.2–5.8)
RBC # FLD: 12.8 % — SIGNIFICANT CHANGE UP (ref 10.3–14.5)
SODIUM SERPL-SCNC: 137 MMOL/L — SIGNIFICANT CHANGE UP (ref 135–145)
WBC # BLD: 5.95 K/UL — SIGNIFICANT CHANGE UP (ref 3.8–10.5)
WBC # FLD AUTO: 5.95 K/UL — SIGNIFICANT CHANGE UP (ref 3.8–10.5)

## 2021-12-22 PROCEDURE — 85027 COMPLETE CBC AUTOMATED: CPT

## 2021-12-22 PROCEDURE — 93005 ELECTROCARDIOGRAM TRACING: CPT

## 2021-12-22 PROCEDURE — 93010 ELECTROCARDIOGRAM REPORT: CPT

## 2021-12-22 PROCEDURE — 83036 HEMOGLOBIN GLYCOSYLATED A1C: CPT

## 2021-12-22 PROCEDURE — 80048 BASIC METABOLIC PNL TOTAL CA: CPT

## 2021-12-22 PROCEDURE — 36415 COLL VENOUS BLD VENIPUNCTURE: CPT

## 2021-12-22 PROCEDURE — G0463: CPT

## 2021-12-22 NOTE — H&P PST ADULT - NSICDXPASTMEDICALHX_GEN_ALL_CORE_FT
PAST MEDICAL HISTORY:  2019 novel coronavirus disease (COVID-19) spring 2020, no pneumonia    COVID-19 vaccine series completed     Diabetes mellitus type 2 diabetes    GI bleed treated with medication, unknown etiology 7 years ago    Hemorrhoids s/p surgery    History of BPH     HTN (hypertension)     Hypercholesteremia     OA (osteoarthritis)     SVT (supraventricular tachycardia) 2009- 4/21

## 2021-12-22 NOTE — H&P PST ADULT - HISTORY OF PRESENT ILLNESS
72 y/o male with h/o back leison for 20 years Couple of times had drainage done  70 y/o male with h/o back leison for 20 years Couple of times had drainage done. Seen by Plastic surgeon and had dressing done yesterday. Denies any pain

## 2021-12-22 NOTE — H&P PST ADULT - NSICDXPASTSURGICALHX_GEN_ALL_CORE_FT
PAST SURGICAL HISTORY:  H/O prior ablation treatment 4/21 for SVT    H/O thyroidectomy partial- remote history    History of knee replacement, total 2000 Right, 2003 LEFT    History of tonsillectomy 23 y/o    S/P hernia repair 6/2021    S/P wrist surgery ORIF - 02/2017    Status post placement of implantable loop recorder

## 2021-12-22 NOTE — H&P PST ADULT - ASSESSMENT
70 y/o male with back leison  Planned surgtery.- excision back leison  Will obtain medical clearance  covid testing 1/28/21-2pm  to stop sildnafil 2 daYS Prior to ssurgery  Pre op instructions provided  Instructions provided on medications to continue and to take the day morning of surgery

## 2021-12-23 LAB
A1C WITH ESTIMATED AVERAGE GLUCOSE RESULT: 6 % — HIGH (ref 4–5.6)
ESTIMATED AVERAGE GLUCOSE: 126 MG/DL — HIGH (ref 68–114)

## 2021-12-27 ENCOUNTER — APPOINTMENT (OUTPATIENT)
Dept: INTERNAL MEDICINE | Facility: CLINIC | Age: 71
End: 2021-12-27
Payer: MEDICARE

## 2021-12-27 VITALS — DIASTOLIC BLOOD PRESSURE: 82 MMHG | SYSTOLIC BLOOD PRESSURE: 138 MMHG

## 2021-12-27 VITALS
TEMPERATURE: 98 F | BODY MASS INDEX: 32.91 KG/M2 | OXYGEN SATURATION: 97 % | WEIGHT: 235.06 LBS | RESPIRATION RATE: 16 BRPM | HEIGHT: 71 IN | HEART RATE: 78 BPM

## 2021-12-27 PROCEDURE — 99214 OFFICE O/P EST MOD 30 MIN: CPT

## 2021-12-27 NOTE — PHYSICAL EXAM
[No Acute Distress] : no acute distress [Well Nourished] : well nourished [Normal Sclera/Conjunctiva] : normal sclera/conjunctiva [PERRL] : pupils equal round and reactive to light [Normal Outer Ear/Nose] : the outer ears and nose were normal in appearance [Normal Oropharynx] : the oropharynx was normal [No JVD] : no jugular venous distention [No Lymphadenopathy] : no lymphadenopathy [No Respiratory Distress] : no respiratory distress  [No Accessory Muscle Use] : no accessory muscle use [Normal Rate] : normal rate  [Regular Rhythm] : with a regular rhythm [No Edema] : there was no peripheral edema [No Extremity Clubbing/Cyanosis] : no extremity clubbing/cyanosis [Soft] : abdomen soft [Non Tender] : non-tender [Non-distended] : non-distended

## 2021-12-28 ENCOUNTER — NON-APPOINTMENT (OUTPATIENT)
Age: 71
End: 2021-12-28

## 2021-12-28 ENCOUNTER — OUTPATIENT (OUTPATIENT)
Dept: OUTPATIENT SERVICES | Facility: HOSPITAL | Age: 71
LOS: 1 days | End: 2021-12-28
Payer: MEDICARE

## 2021-12-28 ENCOUNTER — APPOINTMENT (OUTPATIENT)
Dept: ELECTROPHYSIOLOGY | Facility: CLINIC | Age: 71
End: 2021-12-28
Payer: MEDICARE

## 2021-12-28 VITALS
WEIGHT: 225 LBS | HEIGHT: 71 IN | DIASTOLIC BLOOD PRESSURE: 89 MMHG | HEART RATE: 68 BPM | RESPIRATION RATE: 16 BRPM | SYSTOLIC BLOOD PRESSURE: 139 MMHG | OXYGEN SATURATION: 98 % | BODY MASS INDEX: 31.5 KG/M2

## 2021-12-28 DIAGNOSIS — Z98.89 OTHER SPECIFIED POSTPROCEDURAL STATES: Chronic | ICD-10-CM

## 2021-12-28 DIAGNOSIS — Z20.828 CONTACT WITH AND (SUSPECTED) EXPOSURE TO OTHER VIRAL COMMUNICABLE DISEASES: ICD-10-CM

## 2021-12-28 DIAGNOSIS — Z98.890 OTHER SPECIFIED POSTPROCEDURAL STATES: Chronic | ICD-10-CM

## 2021-12-28 DIAGNOSIS — Z95.818 PRESENCE OF OTHER CARDIAC IMPLANTS AND GRAFTS: Chronic | ICD-10-CM

## 2021-12-28 LAB — SARS-COV-2 RNA SPEC QL NAA+PROBE: SIGNIFICANT CHANGE UP

## 2021-12-28 PROCEDURE — 99214 OFFICE O/P EST MOD 30 MIN: CPT

## 2021-12-28 PROCEDURE — U0003: CPT

## 2021-12-28 PROCEDURE — 93000 ELECTROCARDIOGRAM COMPLETE: CPT

## 2021-12-28 PROCEDURE — U0005: CPT

## 2021-12-28 NOTE — ASSESSMENT
[FreeTextEntry1] : This is a 71 year old man with paroxysmal atrial tachycardia s/p ablation. ILR demonstrates no tachycardia since procedure. \par \par 1) SVT : s/p successful ablation. Fatigue improved on lower dose of Metoprolol Succinate. \par 2) Hypertension: BP Controlled on Metoprolol 50 mg and Lisinopril 10 mg BID will continue at this dose. \par 3) Pre-sncope: no recurrent symptoms since ablation. \par

## 2021-12-28 NOTE — DISCUSSION/SUMMARY
[Procedure Low Risk] : the procedure risk is low [Patient Low Risk] : the patient is a low surgical risk [Optimized for Surgery] : the patient is optimized for surgery [Continue] : Continue medications as currently directed [FreeTextEntry3] : Metoprolol  Lisinopril

## 2021-12-28 NOTE — HISTORY OF PRESENT ILLNESS
[Preoperative Visit] : for a medical evaluation prior to surgery [Scheduled Procedure ___] : a [unfilled] [Good] : Good [Fever] : no fever [Chills] : no chills [Fatigue] : no fatigue [Chest Pain] : no chest pain [Cough] : no cough [Dyspnea] : no dyspnea [Dysuria] : no dysuria [Urinary Frequency] : no urinary frequency [Nausea] : no nausea [Vomiting] : no vomiting [Prior Anesthesia] : No prior anesthesia [FreeTextEntry1] : This is a 71 year old man with recurrent episodes of SVT. He is s/p ablation of a alejandro terminalis atrial tachycardia on April 29, 2021. YOKASTA MCCLELLAND denies chest pain, chest pressure, shortness of breath, lightheadedness, dizziness, palpitations, syncope, presyncope, orthopnea, PND, or edema. \par

## 2021-12-29 ENCOUNTER — TRANSCRIPTION ENCOUNTER (OUTPATIENT)
Age: 71
End: 2021-12-29

## 2021-12-29 NOTE — ASU PATIENT PROFILE, ADULT - NSICDXPASTSURGICALHX_GEN_ALL_CORE_FT
PAST SURGICAL HISTORY:  H/O prior ablation treatment 4/21 for SVT    H/O thyroidectomy partial- remote history    History of knee replacement, total 2000 Right, 2003 LEFT    History of tonsillectomy 25 y/o    S/P hernia repair 6/2021    S/P wrist surgery ORIF - 02/2017    Status post placement of implantable loop recorder

## 2021-12-30 ENCOUNTER — OUTPATIENT (OUTPATIENT)
Dept: OUTPATIENT SERVICES | Facility: HOSPITAL | Age: 71
LOS: 1 days | End: 2021-12-30
Payer: MEDICARE

## 2021-12-30 ENCOUNTER — APPOINTMENT (OUTPATIENT)
Dept: SURGERY | Facility: HOSPITAL | Age: 71
End: 2021-12-30

## 2021-12-30 ENCOUNTER — RESULT REVIEW (OUTPATIENT)
Age: 71
End: 2021-12-30

## 2021-12-30 ENCOUNTER — APPOINTMENT (OUTPATIENT)
Dept: PLASTIC SURGERY | Facility: HOSPITAL | Age: 71
End: 2021-12-30

## 2021-12-30 VITALS
RESPIRATION RATE: 17 BRPM | SYSTOLIC BLOOD PRESSURE: 146 MMHG | OXYGEN SATURATION: 97 % | HEART RATE: 79 BPM | DIASTOLIC BLOOD PRESSURE: 82 MMHG

## 2021-12-30 VITALS
WEIGHT: 223.77 LBS | HEIGHT: 71 IN | SYSTOLIC BLOOD PRESSURE: 131 MMHG | TEMPERATURE: 98 F | RESPIRATION RATE: 16 BRPM | HEART RATE: 86 BPM | DIASTOLIC BLOOD PRESSURE: 78 MMHG | OXYGEN SATURATION: 97 %

## 2021-12-30 DIAGNOSIS — L72.3 SEBACEOUS CYST: ICD-10-CM

## 2021-12-30 DIAGNOSIS — Z98.890 OTHER SPECIFIED POSTPROCEDURAL STATES: Chronic | ICD-10-CM

## 2021-12-30 DIAGNOSIS — Z98.89 OTHER SPECIFIED POSTPROCEDURAL STATES: Chronic | ICD-10-CM

## 2021-12-30 DIAGNOSIS — D48.1 NEOPLASM OF UNCERTAIN BEHAVIOR OF CONNECTIVE AND OTHER SOFT TISSUE: ICD-10-CM

## 2021-12-30 DIAGNOSIS — Z95.818 PRESENCE OF OTHER CARDIAC IMPLANTS AND GRAFTS: Chronic | ICD-10-CM

## 2021-12-30 PROCEDURE — 88305 TISSUE EXAM BY PATHOLOGIST: CPT | Mod: 26

## 2021-12-30 PROCEDURE — 88305 TISSUE EXAM BY PATHOLOGIST: CPT

## 2021-12-30 PROCEDURE — C9399: CPT

## 2021-12-30 PROCEDURE — 14001 TIS TRNFR TRUNK 10.1-30SQCM: CPT

## 2021-12-30 PROCEDURE — 21936 RESECT BACK TUM 5 CM/>: CPT | Mod: AS

## 2021-12-30 PROCEDURE — 21936 RESECT BACK TUM 5 CM/>: CPT

## 2021-12-30 RX ORDER — ONDANSETRON 8 MG/1
4 TABLET, FILM COATED ORAL ONCE
Refills: 0 | Status: DISCONTINUED | OUTPATIENT
Start: 2021-12-30 | End: 2021-12-30

## 2021-12-30 RX ORDER — CEFAZOLIN SODIUM 1 G
2000 VIAL (EA) INJECTION ONCE
Refills: 0 | Status: COMPLETED | OUTPATIENT
Start: 2021-12-30 | End: 2021-12-30

## 2021-12-30 RX ORDER — ACETAMINOPHEN 500 MG
1000 TABLET ORAL ONCE
Refills: 0 | Status: COMPLETED | OUTPATIENT
Start: 2021-12-30 | End: 2021-12-30

## 2021-12-30 RX ORDER — HYDROMORPHONE HYDROCHLORIDE 2 MG/ML
0.5 INJECTION INTRAMUSCULAR; INTRAVENOUS; SUBCUTANEOUS
Refills: 0 | Status: DISCONTINUED | OUTPATIENT
Start: 2021-12-30 | End: 2021-12-30

## 2021-12-30 RX ORDER — OXYCODONE HYDROCHLORIDE 5 MG/1
1 TABLET ORAL
Qty: 20 | Refills: 0
Start: 2021-12-30

## 2021-12-30 RX ORDER — SODIUM CHLORIDE 9 MG/ML
1000 INJECTION, SOLUTION INTRAVENOUS
Refills: 0 | Status: DISCONTINUED | OUTPATIENT
Start: 2021-12-30 | End: 2021-12-30

## 2021-12-30 RX ORDER — OXYCODONE HYDROCHLORIDE 5 MG/1
5 TABLET ORAL ONCE
Refills: 0 | Status: DISCONTINUED | OUTPATIENT
Start: 2021-12-30 | End: 2021-12-30

## 2021-12-30 RX ADMIN — SODIUM CHLORIDE 75 MILLILITER(S): 9 INJECTION, SOLUTION INTRAVENOUS at 12:40

## 2021-12-30 NOTE — ASU DISCHARGE PLAN (ADULT/PEDIATRIC) - CARE PROVIDER_API CALL
ShikowitzBehr, Lauren B (MD)  Plastic Surgery  [address] [place] [id number]  Phone: [phone]  Fax: [phone]  Follow Up Time: 1 week    Romero Clay)  Surgery  [address] [place] [id number]  Phone: [phone]  Fax: [phone]  Follow Up Time: Routine

## 2021-12-30 NOTE — BRIEF OPERATIVE NOTE - NSICDXBRIEFPROCEDURE_GEN_ALL_CORE_FT
PROCEDURES:  Resection, radical, soft tissue tumor, flank, 5 cm or greater 30-Dec-2021 11:36:35  Romero Clay

## 2021-12-30 NOTE — BRIEF OPERATIVE NOTE - NSICDXBRIEFPOSTOP_GEN_ALL_CORE_FT
POST-OP DIAGNOSIS:  Neoplasm of uncertain behavior of connective and other soft tissue 30-Dec-2021 11:34:31  Romero Clay J

## 2021-12-30 NOTE — ASU DISCHARGE PLAN (ADULT/PEDIATRIC) - ASU DC SPECIAL INSTRUCTIONSFT
REST and relax!    Apply ice packs for 30 minutes on/ 30 minutes off every hour while awake for next 48 hours.  You may take Tylenol for minor pain.  You may continue your usual Aspirin and home medications.  You may take Advil or Motrin for moderate pain.  A script for pain medication has been forwarded to your pharmacy.  Take an over the counter stool softener like COLACE twice daily to prevent constipation.  You may shower and remove any outer dressings after 48 hours.   Leave Steri Strips in place.  Call for any questions or concerns! REST and relax!    Apply ice packs for 30 minutes on/ 30 minutes off every hour while awake for next 48 hours.  You may take Tylenol for minor pain.  You may continue your usual Aspirin and home medications.  You may take Advil or Motrin for moderate pain.  Scripts for pain medication and an oral antibiotic has been forwarded to your pharmacy.  Take an over the counter stool softener like COLACE twice daily to prevent constipation.  You may shower and remove outer dressings after 48 hours.   Leave Steri Strips in place.  Call for any questions or concerns! REST and relax!    No heavy lifting or straining. No walking the dogs!  Apply ice packs for 30 minutes on/ 30 minutes off every hour while awake for next 48 hours.  You may take Tylenol for minor pain.  You may continue your usual Aspirin and home medications.  You may take Advil or Motrin for moderate pain.  Scripts for pain medication and an oral antibiotic has been forwarded to your pharmacy.  Take an over the counter stool softener like COLACE twice daily to prevent constipation.  Empty and record drain outputs twice daily.  You may shower and remove outer dressings after 48 hours.   Leave the waterproof "scotch tape" dressing in place.  Call for any questions or concerns!

## 2021-12-30 NOTE — ASU DISCHARGE PLAN (ADULT/PEDIATRIC) - PROVIDER TOKENS
PROVIDER:[TOKEN:[[id number]],FOLLOWUP:[1 week]],PROVIDER:[TOKEN:[[id number]],FOLLOWUP:[Routine]]

## 2021-12-30 NOTE — BRIEF OPERATIVE NOTE - NSICDXBRIEFPREOP_GEN_ALL_CORE_FT
PRE-OP DIAGNOSIS:  Neoplasm of uncertain behavior of connective and other soft tissue 30-Dec-2021 11:34:22  Romero Clay J

## 2021-12-30 NOTE — BRIEF OPERATIVE NOTE - OPERATION/FINDINGS
Reconstruction of 4x6cm back defect with 5x5cm rhomboid flap, placement of 15F jamin drain.
Complex mass of cystic tissues and stellate scarring...

## 2021-12-30 NOTE — ASU DISCHARGE PLAN (ADULT/PEDIATRIC) - NS MD DC FALL RISK RISK
For information on Fall & Injury Prevention, visit: [email or web address] OR  [email or web address] OR  https://www.cdc.gov/steadi/patient.html

## 2022-01-04 ENCOUNTER — RX RENEWAL (OUTPATIENT)
Age: 72
End: 2022-01-04

## 2022-01-06 ENCOUNTER — NON-APPOINTMENT (OUTPATIENT)
Age: 72
End: 2022-01-06

## 2022-01-06 ENCOUNTER — APPOINTMENT (OUTPATIENT)
Dept: ELECTROPHYSIOLOGY | Facility: CLINIC | Age: 72
End: 2022-01-06
Payer: MEDICARE

## 2022-01-06 PROCEDURE — 93298 REM INTERROG DEV EVAL SCRMS: CPT

## 2022-01-06 PROCEDURE — G2066: CPT

## 2022-01-07 ENCOUNTER — APPOINTMENT (OUTPATIENT)
Dept: PLASTIC SURGERY | Facility: CLINIC | Age: 72
End: 2022-01-07
Payer: MEDICARE

## 2022-01-07 PROCEDURE — 99024 POSTOP FOLLOW-UP VISIT: CPT

## 2022-01-07 NOTE — PHYSICAL EXAM
[NI] : Normal [de-identified] : NAD [de-identified] : Back incision remains clean and intact. Surrounding skin irritated from Tape. Drain removed. \par site dressed with xeroform, gauze, and paper tape.

## 2022-01-07 NOTE — HISTORY OF PRESENT ILLNESS
[FreeTextEntry1] : YOKASTA MCCLELLAND is a 71 year M who presents with 1 wk s/p excision of cystic lesion of mid back and reconstruction with rhomboid flap. Patient is recovering well. Has been emptying and recording drain outputs. \par

## 2022-01-14 ENCOUNTER — APPOINTMENT (OUTPATIENT)
Dept: PLASTIC SURGERY | Facility: CLINIC | Age: 72
End: 2022-01-14
Payer: MEDICARE

## 2022-01-14 PROCEDURE — 99024 POSTOP FOLLOW-UP VISIT: CPT

## 2022-01-18 ENCOUNTER — LABORATORY RESULT (OUTPATIENT)
Age: 72
End: 2022-01-18

## 2022-01-18 ENCOUNTER — APPOINTMENT (OUTPATIENT)
Dept: PLASTIC SURGERY | Facility: CLINIC | Age: 72
End: 2022-01-18
Payer: MEDICARE

## 2022-01-18 VITALS
BODY MASS INDEX: 31.5 KG/M2 | TEMPERATURE: 98.2 F | OXYGEN SATURATION: 95 % | HEART RATE: 96 BPM | DIASTOLIC BLOOD PRESSURE: 86 MMHG | SYSTOLIC BLOOD PRESSURE: 162 MMHG | WEIGHT: 225 LBS | HEIGHT: 71 IN

## 2022-01-18 PROCEDURE — 99024 POSTOP FOLLOW-UP VISIT: CPT

## 2022-01-18 NOTE — HISTORY OF PRESENT ILLNESS
[FreeTextEntry1] : YOKASTA MCCLELLAND is a 71 year M who presents with 2 wk s/p excision of cystic lesion of mid back and reconstruction with rhomboid flap. Patient is recovering well. Drain removed at last visit. he states that he has some drainage on the bandage daily. Pain controlled. \par

## 2022-01-18 NOTE — PHYSICAL EXAM
[NI] : Normal [de-identified] : NAD [de-identified] : Back incision remains clean. Superficial slough at central limb of incision.  1/2 sutures removed. \par Dressed with bacitracin and telfa dressing.

## 2022-01-19 NOTE — REASON FOR VISIT
[Post Op: _________] : a [unfilled] post op visit [FreeTextEntry1] : s/p excision of cystic lesion of mid back and reconstruction with rhomboid flap DOS: 12/30/21 with Dr. Heart.

## 2022-01-21 ENCOUNTER — APPOINTMENT (OUTPATIENT)
Dept: PLASTIC SURGERY | Facility: CLINIC | Age: 72
End: 2022-01-21

## 2022-01-21 VITALS — SYSTOLIC BLOOD PRESSURE: 179 MMHG | OXYGEN SATURATION: 96 % | DIASTOLIC BLOOD PRESSURE: 89 MMHG | HEART RATE: 84 BPM

## 2022-01-21 DIAGNOSIS — Z09 ENCOUNTER FOR FOLLOW-UP EXAMINATION AFTER COMPLETED TREATMENT FOR CONDITIONS OTHER THAN MALIGNANT NEOPLASM: ICD-10-CM

## 2022-01-24 ENCOUNTER — APPOINTMENT (OUTPATIENT)
Dept: PLASTIC SURGERY | Facility: CLINIC | Age: 72
End: 2022-01-24
Payer: MEDICARE

## 2022-01-24 VITALS
HEART RATE: 75 BPM | SYSTOLIC BLOOD PRESSURE: 168 MMHG | OXYGEN SATURATION: 96 % | TEMPERATURE: 97.7 F | RESPIRATION RATE: 16 BRPM | DIASTOLIC BLOOD PRESSURE: 87 MMHG

## 2022-01-24 PROCEDURE — 13101 CMPLX RPR TRUNK 2.6-7.5 CM: CPT | Mod: 78

## 2022-01-25 NOTE — PROCEDURE
[FreeTextEntry1] : Open wound of back [FreeTextEntry2] : delayed closure of back wound [FreeTextEntry3] : 8cc 1% lidocaine with epinephrine [FreeTextEntry4] : minimal [FreeTextEntry5] : pippa [FreeTextEntry6] : YOKASTA MCCLELLAND is here for debridement and delayed closure of open wound of back. Risks, benefits, and alternatives to the procedure were discussed. Informed consent was obtained. The site was first marked and then injected with 1% lidocaine with epinephrine to achieve anesthesia and vasoconstriction. The site was then cleaned with betaine solution and draped in a sterile fashion. A #15 blade was used to excise fibrinous granulation tissue from the wound bed and wound edges. The entire would was then irrigated with betadine and normal saline solution. The wound was then reapproximated and closed with 2-0 monocryl in the deeper soft tissues, and 3-0 nylon overtop in a mattress style to kay the skin edges. There was no tension at the repair site.   The area was cleaned and dressed with opsite. \par Post-procedure instructions were discussed with the patient who expressed understanding.\par

## 2022-01-30 ENCOUNTER — TRANSCRIPTION ENCOUNTER (OUTPATIENT)
Age: 72
End: 2022-01-30

## 2022-01-31 ENCOUNTER — APPOINTMENT (OUTPATIENT)
Dept: PLASTIC SURGERY | Facility: CLINIC | Age: 72
End: 2022-01-31
Payer: MEDICARE

## 2022-01-31 PROCEDURE — 99024 POSTOP FOLLOW-UP VISIT: CPT

## 2022-02-01 NOTE — HISTORY OF PRESENT ILLNESS
[FreeTextEntry1] : YOKASTA MCCLELLAND is a 71 year M who presents with s/p excision of cystic lesion of mid back and reconstruction with rhomboid flap. Course complicated by wound separation treated at first with local wound care then with delayed closure in the office last week. He states that the site is still draining but is doing well overall. He is leaving for [facility] later today.  \par

## 2022-02-01 NOTE — PHYSICAL EXAM
[NI] : Normal [de-identified] : Surgical site is healing well. Small open wound at distal tip of rhomboid flap, improved s/p delayed closure. <1cm opening. \par Serous drainage. No signs of bleeding or infection.

## 2022-02-10 ENCOUNTER — APPOINTMENT (OUTPATIENT)
Dept: ELECTROPHYSIOLOGY | Facility: CLINIC | Age: 72
End: 2022-02-10
Payer: COMMERCIAL

## 2022-02-10 ENCOUNTER — NON-APPOINTMENT (OUTPATIENT)
Age: 72
End: 2022-02-10

## 2022-02-10 PROCEDURE — 93298 REM INTERROG DEV EVAL SCRMS: CPT

## 2022-02-10 PROCEDURE — G2066: CPT

## 2022-03-01 ENCOUNTER — APPOINTMENT (OUTPATIENT)
Dept: PLASTIC SURGERY | Facility: CLINIC | Age: 72
End: 2022-03-01

## 2022-03-01 VITALS
OXYGEN SATURATION: 95 % | WEIGHT: 225 LBS | SYSTOLIC BLOOD PRESSURE: 133 MMHG | BODY MASS INDEX: 31.5 KG/M2 | HEART RATE: 75 BPM | HEIGHT: 71 IN | DIASTOLIC BLOOD PRESSURE: 86 MMHG

## 2022-03-02 NOTE — PHYSICAL EXAM
[NI] : Normal [de-identified] : NAD, AxOx3 [de-identified] : nonlabored breathing [de-identified] : normal HR [de-identified] : moving all extremities [de-identified] : Back-wound is [id number] in size. It is clean without any drainage or purulence. Granulation tissue is noted.

## 2022-03-02 NOTE — HISTORY OF PRESENT ILLNESS
[FreeTextEntry1] : Seth Perez is a 72M who presents s/p excision of cystic lesion of mid back and reconstruction with rhomboid flap. Course was complicated by wound separation, treated first with local wound care and then with delayed closure in the office. Sutures remained in place while patient was visiting [facility] for one month. Plan was for the sutures to be removed while abroad, when surgical site was ready.\par Patient states he went for suture removal and was told there was drainage from the wound. He was provided oral antibiotics. Sutures were removed but subsequently patient states wound re-opened. \par patient otherwise has no complaints. He states he feels well and denies fever or chills

## 2022-03-03 ENCOUNTER — RX RENEWAL (OUTPATIENT)
Age: 72
End: 2022-03-03

## 2022-03-07 ENCOUNTER — APPOINTMENT (OUTPATIENT)
Dept: PLASTIC SURGERY | Facility: CLINIC | Age: 72
End: 2022-03-07

## 2022-03-07 NOTE — PHYSICAL EXAM
[NI] : Normal [de-identified] : NAD, AxOx3 [de-identified] : nonlabored breathing [de-identified] : Back wound is [id number] in size. Inferior aspect starting to contract. Granulation tissue noted. No drainage or purulence noted.

## 2022-03-07 NOTE — HISTORY OF PRESENT ILLNESS
[FreeTextEntry1] : Seth Perez is a 71y/o M who presents s/p excision of cystic lesion of mid back and reconstruction with rhomboid flap. Course was complicated by wound separation, treated first with local owund care and then with delayed closure in the office. While patient was abroad in [facility], sutures were removed and wound re-opened. Patient seen today for wound check and placement of wound vac. Patient does not wish to have to the wound vac placed. He prefers to continue local wound care\par He otherwise has no other complaints. States some serous drainage from wound but less.

## 2022-03-16 ENCOUNTER — APPOINTMENT (OUTPATIENT)
Dept: ELECTROPHYSIOLOGY | Facility: CLINIC | Age: 72
End: 2022-03-16
Payer: MEDICARE

## 2022-03-16 ENCOUNTER — NON-APPOINTMENT (OUTPATIENT)
Age: 72
End: 2022-03-16

## 2022-03-16 PROCEDURE — G2066: CPT

## 2022-03-16 PROCEDURE — 93298 REM INTERROG DEV EVAL SCRMS: CPT

## 2022-03-21 ENCOUNTER — APPOINTMENT (OUTPATIENT)
Age: 72
End: 2022-03-21

## 2022-03-21 PROCEDURE — 99024 POSTOP FOLLOW-UP VISIT: CPT

## 2022-03-21 NOTE — PHYSICAL EXAM
[NI] : Normal [de-identified] : NAD, AxOx3 [de-identified] : nonlabored breathing [de-identified] : moving all extremities  [de-identified] : Back wound has contracted to approximately 1cm x1cm x0.5 cm.  Wound is clean without any drainage or purulence.  There is noted to be an erythematous rash around the wound

## 2022-03-21 NOTE — HISTORY OF PRESENT ILLNESS
[FreeTextEntry1] : Seth Perez is a 73 y/o M who presents s/p excision of cystic lesion of mid back and reconstruction with rhomboid flap.  Course was complicated by wound separation, treated first with local wound care and then with delayed closure in the office. Patient was abroad in [facility], sutures were removed and wound re-opened.  Patient has been doing wet to dry dressing changes to wound daily.  He offers no complaints today.

## 2022-04-04 ENCOUNTER — APPOINTMENT (OUTPATIENT)
Dept: PLASTIC SURGERY | Facility: CLINIC | Age: 72
End: 2022-04-04

## 2022-04-04 DIAGNOSIS — L72.3 SEBACEOUS CYST: ICD-10-CM

## 2022-04-04 DIAGNOSIS — L08.9 SEBACEOUS CYST: ICD-10-CM

## 2022-04-04 DIAGNOSIS — T81.31XA DISRUPTION OF EXTERNAL OPERATION (SURGICAL) WOUND, NOT ELSEWHERE CLASSIFIED, INITIAL ENCOUNTER: ICD-10-CM

## 2022-04-04 PROCEDURE — 99212 OFFICE O/P EST SF 10 MIN: CPT

## 2022-04-04 NOTE — PHYSICAL EXAM
[NI] : Normal [de-identified] : NAD, AxOx3 [de-identified] : nonlabored breathing [de-identified] : no edema [de-identified] : Back- wound with minimal drainage on. Previous skin irritation has since resolved.\par Wound contracted and healing well to now less then 0.5cm in size.

## 2022-04-04 NOTE — HISTORY OF PRESENT ILLNESS
[FreeTextEntry1] : Seth Perez is a 71 y/o male who presents s/p excision of cystic lesion of back with rhomboid flap. Course was complicated by wound separation, treated first with local wound care and then with delayed closure in the office.  Patient was abroad in [facility] , sutures were removed and wound re-opened. He has since been performing wet to dry dressing changes. Today patient has no complaints.

## 2022-04-11 ENCOUNTER — RX RENEWAL (OUTPATIENT)
Age: 72
End: 2022-04-11

## 2022-04-13 ENCOUNTER — APPOINTMENT (OUTPATIENT)
Dept: INTERNAL MEDICINE | Facility: CLINIC | Age: 72
End: 2022-04-13
Payer: MEDICARE

## 2022-04-13 ENCOUNTER — RX RENEWAL (OUTPATIENT)
Age: 72
End: 2022-04-13

## 2022-04-13 VITALS — DIASTOLIC BLOOD PRESSURE: 80 MMHG | SYSTOLIC BLOOD PRESSURE: 132 MMHG

## 2022-04-13 VITALS
BODY MASS INDEX: 31.5 KG/M2 | RESPIRATION RATE: 16 BRPM | HEIGHT: 71 IN | TEMPERATURE: 97.8 F | OXYGEN SATURATION: 96 % | WEIGHT: 225 LBS | HEART RATE: 91 BPM

## 2022-04-13 PROCEDURE — 36415 COLL VENOUS BLD VENIPUNCTURE: CPT

## 2022-04-13 PROCEDURE — 99214 OFFICE O/P EST MOD 30 MIN: CPT | Mod: 25

## 2022-04-13 NOTE — PHYSICAL EXAM
[No Acute Distress] : no acute distress [Normal Sclera/Conjunctiva] : normal sclera/conjunctiva [Normal Outer Ear/Nose] : the outer ears and nose were normal in appearance [No JVD] : no jugular venous distention [No Lymphadenopathy] : no lymphadenopathy [No Respiratory Distress] : no respiratory distress  [No Accessory Muscle Use] : no accessory muscle use [Clear to Auscultation] : lungs were clear to auscultation bilaterally [Normal Rate] : normal rate  [Regular Rhythm] : with a regular rhythm [No Edema] : there was no peripheral edema [No Extremity Clubbing/Cyanosis] : no extremity clubbing/cyanosis [Soft] : abdomen soft [Non Tender] : non-tender [Non-distended] : non-distended [No Spinal Tenderness] : no spinal tenderness

## 2022-04-13 NOTE — REVIEW OF SYSTEMS
[Joint Pain] : joint pain [Joint Stiffness] : joint stiffness [Itching] : itching [Skin Rash] : skin rash [Fever] : no fever [Chills] : no chills [Chest Pain] : no chest pain [Palpitations] : no palpitations [Lower Ext Edema] : no lower extremity edema [Shortness Of Breath] : no shortness of breath [Wheezing] : no wheezing [Cough] : no cough [Abdominal Pain] : no abdominal pain [Vomiting] : no vomiting [Dysuria] : no dysuria

## 2022-04-13 NOTE — HISTORY OF PRESENT ILLNESS
[de-identified] : Pt was called to come in for FBW as he has had no hgba1c since october  Feels well  Had surgery on cyst of back which required dollow up

## 2022-04-15 LAB
ALBUMIN SERPL ELPH-MCNC: 4.5 G/DL
ALP BLD-CCNC: 64 U/L
ALT SERPL-CCNC: 40 U/L
ANION GAP SERPL CALC-SCNC: 14 MMOL/L
AST SERPL-CCNC: 34 U/L
BASOPHILS # BLD AUTO: 0.09 K/UL
BASOPHILS NFR BLD AUTO: 1.5 %
BILIRUB SERPL-MCNC: 0.3 MG/DL
BUN SERPL-MCNC: 11 MG/DL
CALCIUM SERPL-MCNC: 9.4 MG/DL
CHLORIDE SERPL-SCNC: 102 MMOL/L
CHOLEST SERPL-MCNC: 245 MG/DL
CO2 SERPL-SCNC: 25 MMOL/L
CREAT SERPL-MCNC: 0.83 MG/DL
EGFR: 92 ML/MIN/1.73M2
EOSINOPHIL # BLD AUTO: 0.82 K/UL
EOSINOPHIL NFR BLD AUTO: 13.7 %
ESTIMATED AVERAGE GLUCOSE: 154 MG/DL
GLUCOSE SERPL-MCNC: 147 MG/DL
HBA1C MFR BLD HPLC: 7 %
HCT VFR BLD CALC: 45.5 %
HDLC SERPL-MCNC: 68 MG/DL
HGB BLD-MCNC: 14.9 G/DL
IMM GRANULOCYTES NFR BLD AUTO: 0.3 %
LDLC SERPL CALC-MCNC: 164 MG/DL
LYMPHOCYTES # BLD AUTO: 2.18 K/UL
LYMPHOCYTES NFR BLD AUTO: 36.3 %
MAN DIFF?: NORMAL
MCHC RBC-ENTMCNC: 32 PG
MCHC RBC-ENTMCNC: 32.7 GM/DL
MCV RBC AUTO: 97.6 FL
MONOCYTES # BLD AUTO: 0.57 K/UL
MONOCYTES NFR BLD AUTO: 9.5 %
NEUTROPHILS # BLD AUTO: 2.32 K/UL
NEUTROPHILS NFR BLD AUTO: 38.7 %
NONHDLC SERPL-MCNC: 177 MG/DL
PLATELET # BLD AUTO: 266 K/UL
POTASSIUM SERPL-SCNC: 5.2 MMOL/L
PROT SERPL-MCNC: 7.2 G/DL
RBC # BLD: 4.66 M/UL
RBC # FLD: 13.8 %
SODIUM SERPL-SCNC: 141 MMOL/L
TRIGL SERPL-MCNC: 64 MG/DL
TSH SERPL-ACNC: 1.22 UIU/ML
WBC # FLD AUTO: 6 K/UL

## 2022-04-20 ENCOUNTER — NON-APPOINTMENT (OUTPATIENT)
Age: 72
End: 2022-04-20

## 2022-04-20 ENCOUNTER — APPOINTMENT (OUTPATIENT)
Dept: ELECTROPHYSIOLOGY | Facility: CLINIC | Age: 72
End: 2022-04-20
Payer: MEDICARE

## 2022-04-20 PROCEDURE — 93298 REM INTERROG DEV EVAL SCRMS: CPT

## 2022-04-20 PROCEDURE — G2066: CPT

## 2022-05-17 ENCOUNTER — APPOINTMENT (OUTPATIENT)
Dept: ORTHOPEDIC SURGERY | Facility: CLINIC | Age: 72
End: 2022-05-17
Payer: MEDICARE

## 2022-05-17 DIAGNOSIS — G57.91 UNSPECIFIED MONONEUROPATHY OF RIGHT LOWER LIMB: ICD-10-CM

## 2022-05-17 DIAGNOSIS — S97.81XS CRUSHING INJURY OF RIGHT FOOT, SEQUELA: ICD-10-CM

## 2022-05-17 DIAGNOSIS — M19.071 PRIMARY OSTEOARTHRITIS, RIGHT ANKLE AND FOOT: ICD-10-CM

## 2022-05-17 PROCEDURE — 73600 X-RAY EXAM OF ANKLE: CPT | Mod: RT

## 2022-05-17 PROCEDURE — 73630 X-RAY EXAM OF FOOT: CPT | Mod: RT

## 2022-05-17 PROCEDURE — 99203 OFFICE O/P NEW LOW 30 MIN: CPT

## 2022-05-17 NOTE — HISTORY OF PRESENT ILLNESS
[de-identified] : 5/17/22: Pt is a 72 year old M who presents today for evaluation of their right foot. Pt states that he fx'd it ~ 21 yrs ago when his son stomped on it; noticeable increase in pain over the past 3 yrs. Advil/Tylenol PRN. Denies recent trauma. Always swollen. + N/T due to neuropathy. Pain will radiate to toes and ankle. WB in sneakers.  DM with neuropathy HGA1C 6.0%\par  [] : Post Surgical Visit: no [FreeTextEntry1] : R foot

## 2022-05-17 NOTE — IMAGING
[Right] : right foot [Weight -] : weightbearing [Degenerative change] : Degenerative change [FreeTextEntry9] : Spurring to the medial mal.  [de-identified] : NC degenerative changes.

## 2022-05-17 NOTE — DISCUSSION/SUMMARY
[de-identified] : Recommend full length custom cushioned orthotics with arch support and plastizote liner. diabetic footcare and lidoderm patches as needed.

## 2022-05-17 NOTE — PHYSICAL EXAM
[Right] : right foot and ankle [Mild] : mild swelling of dorsal foot [] : no syndesmosis tenderness [NL (20)] : dorsiflexion 20 degrees [NL (40)] : plantar flexion 40 degrees [5___] : plantar flexion 5[unfilled]/5 [2+] : dorsalis pedis pulse: 2+ [Decreased] : saphenous nerve sensation decreased [FreeTextEntry8] : +TTP NC joint

## 2022-05-25 ENCOUNTER — APPOINTMENT (OUTPATIENT)
Dept: ELECTROPHYSIOLOGY | Facility: CLINIC | Age: 72
End: 2022-05-25
Payer: MEDICARE

## 2022-05-25 ENCOUNTER — NON-APPOINTMENT (OUTPATIENT)
Age: 72
End: 2022-05-25

## 2022-05-25 PROCEDURE — G2066: CPT

## 2022-05-25 PROCEDURE — 93298 REM INTERROG DEV EVAL SCRMS: CPT

## 2022-06-15 ENCOUNTER — RX RENEWAL (OUTPATIENT)
Age: 72
End: 2022-06-15

## 2022-06-28 ENCOUNTER — NON-APPOINTMENT (OUTPATIENT)
Age: 72
End: 2022-06-28

## 2022-06-29 ENCOUNTER — APPOINTMENT (OUTPATIENT)
Dept: ELECTROPHYSIOLOGY | Facility: CLINIC | Age: 72
End: 2022-06-29

## 2022-06-29 PROCEDURE — 93298 REM INTERROG DEV EVAL SCRMS: CPT

## 2022-06-29 PROCEDURE — G2066: CPT

## 2022-06-30 NOTE — H&P PST ADULT - FAMILY HISTORY
Will refer to urology.  Pt to RTC and bring urine sample to lab  
Father  Still living? No  Family history of heart disease, Age at diagnosis: Age Unknown     Mother  Still living? No  Family history of lung cancer, Age at diagnosis: Age Unknown     Sibling  Still living? No  Family history of cerebrovascular accident (CVA), Age at diagnosis: Age Unknown  Family history of heart disease, Age at diagnosis: Age Unknown

## 2022-07-13 ENCOUNTER — RX RENEWAL (OUTPATIENT)
Age: 72
End: 2022-07-13

## 2022-07-22 ENCOUNTER — NON-APPOINTMENT (OUTPATIENT)
Age: 72
End: 2022-07-22

## 2022-07-23 ENCOUNTER — TRANSCRIPTION ENCOUNTER (OUTPATIENT)
Age: 72
End: 2022-07-23

## 2022-07-28 ENCOUNTER — OUTPATIENT (OUTPATIENT)
Dept: OUTPATIENT SERVICES | Facility: HOSPITAL | Age: 72
LOS: 1 days | End: 2022-07-28

## 2022-07-28 ENCOUNTER — APPOINTMENT (OUTPATIENT)
Dept: DISASTER EMERGENCY | Facility: HOSPITAL | Age: 72
End: 2022-07-28

## 2022-07-28 VITALS
TEMPERATURE: 98 F | HEART RATE: 66 BPM | SYSTOLIC BLOOD PRESSURE: 164 MMHG | OXYGEN SATURATION: 96 % | DIASTOLIC BLOOD PRESSURE: 93 MMHG | RESPIRATION RATE: 17 BRPM

## 2022-07-28 VITALS
DIASTOLIC BLOOD PRESSURE: 89 MMHG | HEART RATE: 76 BPM | TEMPERATURE: 98 F | RESPIRATION RATE: 17 BRPM | WEIGHT: 230.38 LBS | SYSTOLIC BLOOD PRESSURE: 155 MMHG | OXYGEN SATURATION: 96 % | HEIGHT: 71 IN

## 2022-07-28 DIAGNOSIS — Z98.89 OTHER SPECIFIED POSTPROCEDURAL STATES: Chronic | ICD-10-CM

## 2022-07-28 DIAGNOSIS — Z98.890 OTHER SPECIFIED POSTPROCEDURAL STATES: Chronic | ICD-10-CM

## 2022-07-28 DIAGNOSIS — U07.1 COVID-19: ICD-10-CM

## 2022-07-28 DIAGNOSIS — Z95.818 PRESENCE OF OTHER CARDIAC IMPLANTS AND GRAFTS: Chronic | ICD-10-CM

## 2022-07-28 RX ORDER — BEBTELOVIMAB 87.5 MG/ML
175 INJECTION, SOLUTION INTRAVENOUS ONCE
Refills: 0 | Status: COMPLETED | OUTPATIENT
Start: 2022-07-28 | End: 2022-07-28

## 2022-07-28 RX ADMIN — BEBTELOVIMAB 175 MILLIGRAM(S): 87.5 INJECTION, SOLUTION INTRAVENOUS at 10:25

## 2022-07-28 NOTE — CHART NOTE - NSCHARTNOTEFT_GEN_A_CORE
CC: Monoclonal Antibody Infusion/COVID 19 Positive  72y Male with PMHx of HTn, DM, SVT s/p ablation, Covid in March 2020 and recent dx of COVID 19+ who presents today for elective Bebtelovimab. Patient has been screened and was deemed to be a candidate.    Symptoms/ Criteria  Date of Symptom Onset: 7/22/22  Symptoms: Cough, HA, body ache, fatigue  Date of Positive COVID PCR: 7/23/22  Risk Profile includes:       Vaccination Status: Pfizer x3    PMHx:  Family history of heart disease (Father)    Family history of lung cancer (Mother)    Family history of cerebrovascular accident (CVA) (Sibling)    Family history of heart disease (Sibling)    Infection due to severe acute respiratory syndrome coronavirus 2 (SARS-CoV-2)    Diabetes mellitus    OA (osteoarthritis)    Obesity    GI bleed    Hemorrhoids    Colonic polyp    BRBPR (bright red blood per rectum)    SVT (supraventricular tachycardia)    HTN (hypertension)    Hypercholesteremia    History of BPH    2019 novel coronavirus disease (COVID-19)    COVID-19 vaccine series completed    History of knee replacement, total    History of tonsillectomy    H/O thyroidectomy    S/P wrist surgery    Status post placement of implantable loop recorder    H/O prior ablation treatment    S/P hernia repair        Exam/findings:  T(C): --  HR: --  BP: --  RR: --  SpO2: --    PE:   Appearance: NAD	  HEENT:  NC/AT  Cardiovascular:  No edema  Respiratory: no use of accessory muscles  Gastrointestinal:  non-distended   Skin: warm and dry  Neurologic: Non-focal  Extremities: Normal range of motion    ASSESSMENT:  Pt is COVID positive with mild to moderate symptoms who was referred for elective MAB (Bebtelovimab).    PLAN:  - MAB treatment explained to patient. I have reviewed the Bebtelovimab Emergency Use Authorization (EUA) and I have provided the patient or patient's caregiver with the following information:   1. FDA has authorized emergency use of Bebtelovimab to be administered for the treatment of mild to moderate COVID-19, which is not an FDA-approved biological product.   2. The patient or patient's caregiver has the option to accept or refuse administration of MAB.   3. The significant known and potential risks and benefits of Bebtelovimab and the extent to which such risks and benefits are unknown.  4. Information on available alternative treatments and risks and benefits of those alternatives.  - Patient verbalized understanding of plan and agrees to treatment. All questions answered.  - Consent for MAB obtained.   - 175mg of Bebtelovimab administered as a single intravenous injection over at least 30 seconds.   - Observe patient for one hour post medication administration and then if stable, discharge home with outpatient follow up as planned by PCP.      POST ASSESSMENT:   Patient completed MAB, and monitored x 1 hour post-infusion with no adverse reactions noted, remained hemodynamically stable.  - Patient tolerated infusion well; denies complaints of chest pain/SOB/dizziness/palpitations.   - VSS for discharge home.  - D/C instructions given/ fact sheet included.  - Patient was instructed to self-isolate and use infection control measures (e.g wear mask, isolate, social distance, avoid sharing personal items, clean and disinfect "high touch" surfaces, and frequent handwashing according to the CDC guidelines.   - The patient was informed on what symptoms to be aware of for the next couple of days, and if there are any issues to call the 24/7 clinical call center. Patient was instructed to follow up with primary care provider as needed.

## 2022-07-29 NOTE — H&P PST ADULT - ALCOHOL USE HISTORY SINGLE SELECT
----- Message from Jennifer Rodriguez MD sent at 7/28/2022  6:48 PM EDT -----  Please notify patient appropriate drop in b-hCG   She will continue with weekly lab draws at this time yes...

## 2022-08-03 ENCOUNTER — NON-APPOINTMENT (OUTPATIENT)
Age: 72
End: 2022-08-03

## 2022-08-05 ENCOUNTER — APPOINTMENT (OUTPATIENT)
Dept: ELECTROPHYSIOLOGY | Facility: CLINIC | Age: 72
End: 2022-08-05

## 2022-08-05 ENCOUNTER — EMERGENCY (EMERGENCY)
Facility: HOSPITAL | Age: 72
LOS: 1 days | Discharge: HOME | End: 2022-08-05
Attending: EMERGENCY MEDICINE | Admitting: EMERGENCY MEDICINE
Payer: MEDICARE

## 2022-08-05 VITALS
HEIGHT: 71 IN | SYSTOLIC BLOOD PRESSURE: 164 MMHG | HEART RATE: 78 BPM | DIASTOLIC BLOOD PRESSURE: 98 MMHG | RESPIRATION RATE: 16 BRPM | WEIGHT: 225.09 LBS | TEMPERATURE: 98 F

## 2022-08-05 VITALS
RESPIRATION RATE: 16 BRPM | SYSTOLIC BLOOD PRESSURE: 155 MMHG | TEMPERATURE: 98 F | HEART RATE: 69 BPM | DIASTOLIC BLOOD PRESSURE: 90 MMHG

## 2022-08-05 DIAGNOSIS — Z98.890 OTHER SPECIFIED POSTPROCEDURAL STATES: Chronic | ICD-10-CM

## 2022-08-05 DIAGNOSIS — Z98.89 OTHER SPECIFIED POSTPROCEDURAL STATES: Chronic | ICD-10-CM

## 2022-08-05 DIAGNOSIS — Z95.818 PRESENCE OF OTHER CARDIAC IMPLANTS AND GRAFTS: Chronic | ICD-10-CM

## 2022-08-05 PROCEDURE — 73590 X-RAY EXAM OF LOWER LEG: CPT | Mod: 26,RT

## 2022-08-05 PROCEDURE — 99284 EMERGENCY DEPT VISIT MOD MDM: CPT

## 2022-08-05 PROCEDURE — 99283 EMERGENCY DEPT VISIT LOW MDM: CPT | Mod: 25

## 2022-08-05 PROCEDURE — 73590 X-RAY EXAM OF LOWER LEG: CPT

## 2022-08-05 RX ORDER — OXYCODONE AND ACETAMINOPHEN 5; 325 MG/1; MG/1
1 TABLET ORAL
Qty: 10 | Refills: 0
Start: 2022-08-05

## 2022-08-05 RX ORDER — OXYCODONE AND ACETAMINOPHEN 5; 325 MG/1; MG/1
1 TABLET ORAL ONCE
Refills: 0 | Status: DISCONTINUED | OUTPATIENT
Start: 2022-08-05 | End: 2022-08-05

## 2022-08-05 RX ADMIN — OXYCODONE AND ACETAMINOPHEN 1 TABLET(S): 5; 325 TABLET ORAL at 11:37

## 2022-08-05 RX ADMIN — OXYCODONE AND ACETAMINOPHEN 1 TABLET(S): 5; 325 TABLET ORAL at 13:43

## 2022-08-05 NOTE — ED PROVIDER NOTE - PROGRESS NOTE DETAILS
Reevaluated patient at bedside.  Patient feeling much improved, ambulated with cane.  Discussed the results of xray.   An opportunity to ask questions was given.  Discussed the importance of prompt, close medical follow-up.  Patient will return with any changes, concerns or persistent / worsening symptoms.  Understanding of all instructions verbalized.

## 2022-08-05 NOTE — ED PROVIDER NOTE - CARE PROVIDER_API CALL
Shaq Mims)  Internal Medicine  [facility] [place] [address]  Phone: [phone]  Fax: [phone]  Follow Up Time: 1-3 Days    Nelson Crane)  Orthopaedic Surgery  [address] [place] [address]  Phone: [phone]  Fax: [phone]  Follow Up Time: 1-3 Days

## 2022-08-05 NOTE — ED ADULT NURSE NOTE - NSIMPLEMENTINTERV_GEN_ALL_ED
Implemented All Fall Risk Interventions:  [place] to call system. Call bell, personal items and telephone within reach. Instruct patient to call for assistance. Room bathroom lighting operational. Non-slip footwear when patient is off stretcher. Physically safe environment: no spills, clutter or unnecessary equipment. Stretcher in lowest position, wheels locked, appropriate side rails in place. Provide visual cue, wrist band, yellow gown, etc. Monitor gait and stability. Monitor for mental status changes and reorient to person, place, and time. Review medications for side effects contributing to fall risk. Reinforce activity limits and safety measures with patient and family.

## 2022-08-05 NOTE — ED PROVIDER NOTE - PROVIDER TOKENS
PROVIDER:[TOKEN:[[id number]],FOLLOWUP:[1-3 Days]],PROVIDER:[TOKEN:[[id number]],FOLLOWUP:[1-3 Days]]

## 2022-08-05 NOTE — ED PROVIDER NOTE - MUSCULOSKELETAL, MLM
rle hip knee nt, full rom, shin with swelling tenderness, + skin tears. ankle foot nt full rom, distal n/v intact

## 2022-08-05 NOTE — ED PROVIDER NOTE - CLINICAL SUMMARY MEDICAL DECISION MAKING FREE TEXT BOX
pt with right lower leg injury yest seen at urgent care, had wound care for skin tears, but no xray, presenting to er for right lower leg pain. pt took motrin at 1000.  plan - xr/percocet

## 2022-08-05 NOTE — ED ADULT TRIAGE NOTE - CHIEF COMPLAINT QUOTE
" I slipped off the boat and skinned off the skin on my right lower leg yesterday, went to Urgent Care, they cleaned it but no Rx antibiotics "

## 2022-08-05 NOTE — ED PROVIDER NOTE - PATIENT PORTAL LINK FT
You can access the FollowMyHealth Patient Portal offered by [facility] by registering at the following website: [email or web address]. By joining [facility]’s FollowMyHealth portal, you will also be able to view your health information using other applications (apps) compatible with our system.

## 2022-08-05 NOTE — ED ADULT NURSE NOTE - OBJECTIVE STATEMENT
Patient presents with wound to right lower shin after mechanical fall yesterday.  Was evaluated by urgent care yesterday and wound was cleaned and dressed.  As per patient, he is unable to bear weight on affected leg as of today.  Mild seruos-sanguinous discharge appreciated.  No odor.  Area immediately surrounding wound is warm to touch.  Patient denies fever/chills.  History of DM and HTN.

## 2022-08-05 NOTE — ED PROVIDER NOTE - OBJECTIVE STATEMENT
pt with right lower leg injury yesterday when banged on bulkhead. pt went to urgent care for skin tears, had wound care, no xray, developed worsening pain to lower leg today, so came to er. no fevers, chills, weakness, numbness. pt took motrin 10am. pt with right lower leg injury yesterday when banged on bulkhead. pt went to urgent care for skin tears, had wound care, no xray, developed worsening pain to lower leg today, so came to er. no fevers, chills, weakness, numbness or any other injuries. pt took motrin 10am.  pmd - tyrell

## 2022-08-08 ENCOUNTER — NON-APPOINTMENT (OUTPATIENT)
Age: 72
End: 2022-08-08

## 2022-08-08 ENCOUNTER — APPOINTMENT (OUTPATIENT)
Dept: ELECTROPHYSIOLOGY | Facility: CLINIC | Age: 72
End: 2022-08-08

## 2022-08-08 PROCEDURE — G2066: CPT

## 2022-08-08 PROCEDURE — 93298 REM INTERROG DEV EVAL SCRMS: CPT

## 2022-08-10 ENCOUNTER — APPOINTMENT (OUTPATIENT)
Dept: INTERNAL MEDICINE | Facility: CLINIC | Age: 72
End: 2022-08-10

## 2022-08-10 VITALS
WEIGHT: 231 LBS | HEART RATE: 79 BPM | TEMPERATURE: 98 F | RESPIRATION RATE: 16 BRPM | HEIGHT: 71 IN | OXYGEN SATURATION: 98 % | BODY MASS INDEX: 32.34 KG/M2

## 2022-08-10 VITALS — SYSTOLIC BLOOD PRESSURE: 134 MMHG | DIASTOLIC BLOOD PRESSURE: 80 MMHG

## 2022-08-10 PROCEDURE — 99214 OFFICE O/P EST MOD 30 MIN: CPT | Mod: 25

## 2022-08-10 PROCEDURE — 36415 COLL VENOUS BLD VENIPUNCTURE: CPT

## 2022-08-10 NOTE — PHYSICAL EXAM
[No Acute Distress] : no acute distress [Normal Sclera/Conjunctiva] : normal sclera/conjunctiva [Normal Outer Ear/Nose] : the outer ears and nose were normal in appearance [No JVD] : no jugular venous distention [No Respiratory Distress] : no respiratory distress  [No Accessory Muscle Use] : no accessory muscle use [Clear to Auscultation] : lungs were clear to auscultation bilaterally [Normal Rate] : normal rate  [Regular Rhythm] : with a regular rhythm [de-identified] : right leg has hematoma and swelling and superficieal wounds with pedal edema [de-identified] : minimally red superficial wound

## 2022-08-10 NOTE — HISTORY OF PRESENT ILLNESS
[FreeTextEntry8] : Pt fell of a boat dock and scraped right lower leg.  went to Urgent care and was cleaned up  Net day couldn’t walk and went to er and started on 3 days of antibiotics and told to come see PCP  Just started to be able to walk this morning  NO fever  Finished antibiotics  Has been changing dressing himself.  Had tetanus less than 5 yrs ago  No blood work done recently on this DM2

## 2022-08-10 NOTE — PLAN
[FreeTextEntry1] : start bactrim  refer to dr greer dipesh knows him already for eval of wound   \par \par Pt to call if any fever or change in sensation of leg   wound precautions given

## 2022-08-11 LAB
ALBUMIN SERPL ELPH-MCNC: 4.3 G/DL
ALP BLD-CCNC: 71 U/L
ALT SERPL-CCNC: 42 U/L
ANION GAP SERPL CALC-SCNC: 12 MMOL/L
AST SERPL-CCNC: 28 U/L
BASOPHILS # BLD AUTO: 0.09 K/UL
BASOPHILS NFR BLD AUTO: 1.2 %
BILIRUB SERPL-MCNC: 0.7 MG/DL
BUN SERPL-MCNC: 13 MG/DL
CALCIUM SERPL-MCNC: 9.9 MG/DL
CHLORIDE SERPL-SCNC: 100 MMOL/L
CHOLEST SERPL-MCNC: 214 MG/DL
CO2 SERPL-SCNC: 25 MMOL/L
CREAT SERPL-MCNC: 0.8 MG/DL
EGFR: 93 ML/MIN/1.73M2
EOSINOPHIL # BLD AUTO: 0.62 K/UL
EOSINOPHIL NFR BLD AUTO: 8.4 %
ESTIMATED AVERAGE GLUCOSE: 154 MG/DL
GLUCOSE SERPL-MCNC: 181 MG/DL
HBA1C MFR BLD HPLC: 7 %
HCT VFR BLD CALC: 37.8 %
HDLC SERPL-MCNC: 53 MG/DL
HGB BLD-MCNC: 12.6 G/DL
IMM GRANULOCYTES NFR BLD AUTO: 0.3 %
LDLC SERPL CALC-MCNC: 148 MG/DL
LYMPHOCYTES # BLD AUTO: 2.1 K/UL
LYMPHOCYTES NFR BLD AUTO: 28.3 %
MAN DIFF?: NORMAL
MCHC RBC-ENTMCNC: 32.4 PG
MCHC RBC-ENTMCNC: 33.3 GM/DL
MCV RBC AUTO: 97.2 FL
MONOCYTES # BLD AUTO: 0.68 K/UL
MONOCYTES NFR BLD AUTO: 9.2 %
NEUTROPHILS # BLD AUTO: 3.9 K/UL
NEUTROPHILS NFR BLD AUTO: 52.6 %
NONHDLC SERPL-MCNC: 161 MG/DL
PLATELET # BLD AUTO: 320 K/UL
POTASSIUM SERPL-SCNC: 5 MMOL/L
PROT SERPL-MCNC: 7 G/DL
RBC # BLD: 3.89 M/UL
RBC # FLD: 12.9 %
SODIUM SERPL-SCNC: 137 MMOL/L
TRIGL SERPL-MCNC: 63 MG/DL
WBC # FLD AUTO: 7.41 K/UL

## 2022-08-12 ENCOUNTER — APPOINTMENT (OUTPATIENT)
Dept: PLASTIC SURGERY | Facility: CLINIC | Age: 72
End: 2022-08-12
Payer: MEDICARE

## 2022-08-12 ENCOUNTER — OUTPATIENT (OUTPATIENT)
Dept: OUTPATIENT SERVICES | Facility: HOSPITAL | Age: 72
LOS: 1 days | End: 2022-08-12
Payer: MEDICARE

## 2022-08-12 ENCOUNTER — APPOINTMENT (OUTPATIENT)
Dept: ULTRASOUND IMAGING | Facility: CLINIC | Age: 72
End: 2022-08-12

## 2022-08-12 DIAGNOSIS — Z98.890 OTHER SPECIFIED POSTPROCEDURAL STATES: Chronic | ICD-10-CM

## 2022-08-12 DIAGNOSIS — Z98.89 OTHER SPECIFIED POSTPROCEDURAL STATES: Chronic | ICD-10-CM

## 2022-08-12 DIAGNOSIS — S80.11XS CONTUSION OF RIGHT LOWER LEG, SEQUELA: ICD-10-CM

## 2022-08-12 DIAGNOSIS — Z95.818 PRESENCE OF OTHER CARDIAC IMPLANTS AND GRAFTS: Chronic | ICD-10-CM

## 2022-08-12 PROCEDURE — 93971 EXTREMITY STUDY: CPT | Mod: 26,RT

## 2022-08-12 PROCEDURE — 10140 I&D HMTMA SEROMA/FLUID COLLJ: CPT

## 2022-08-12 PROCEDURE — 93971 EXTREMITY STUDY: CPT

## 2022-08-15 ENCOUNTER — APPOINTMENT (OUTPATIENT)
Dept: PLASTIC SURGERY | Facility: CLINIC | Age: 72
End: 2022-08-15

## 2022-08-15 PROCEDURE — 99024 POSTOP FOLLOW-UP VISIT: CPT

## 2022-08-16 NOTE — PHYSICAL EXAM
[de-identified] : NAD, AxOX3  [de-identified] : nonlabored breathing  [de-identified] : RLE- swelling still present from knee to the foot. The superficial skin tears still present on anterior tibial area. Hematoma site has decreased in size since after evacuation. There is erythema noted today on the distal lower leg. No drainage.  [de-identified] : as above  [de-identified] : normal affect

## 2022-08-16 NOTE — PHYSICAL EXAM
[NI] : Normal [de-identified] : NAD, AxOx3  [de-identified] : nonlabored breathing  [de-identified] : RLE- there is significant swelling from the knee to the foot. There is some superficial skin tears to the anterior tibial area. There is no evidence of infection. There IS evidence of hematoma to the right lateral aspect of the leg. This area is very tender to touch. There is no erythema but there is appreciable and expected ecchymosis. +DP and +PT pulses appreciated. sensation is intact and foot is warm to touch. There is no evidence of vascular compromise.  [de-identified] : as above [de-identified] : normal affect

## 2022-08-16 NOTE — HISTORY OF PRESENT ILLNESS
[FreeTextEntry1] : Seth Perez is a 73 y/o male well known to the practice. He presents today complaining of a fall off a dock sustaining injury to his RLE one week ago.  He states he went to the local ED where xrays showed there was no fracture. He did not need any sutures to repair any lacerations. Patient states since that time he has had progressive swelling and pain to the RLE. He denies any fever, chills or any other constitutional complaints.

## 2022-08-16 NOTE — REVIEW OF SYSTEMS
[Fever] : no fever [Chills] : no chills [As Noted in HPI] : as noted in HPI [Negative] : Psychiatric [FreeTextEntry9] : RLE pain and swelling

## 2022-08-16 NOTE — HISTORY OF PRESENT ILLNESS
[FreeTextEntry1] : Seth Perez is a 73 y/o male s/p evacuation of hematoma from RLE on 8/12/22, after sustaining a fall a week prior. Patient states swelling at hematoma site has improved but he does still complain of persistent pain to the right leg. He is continuing with his oral antibiotics. Patient denies fever and chills

## 2022-08-18 NOTE — BEDSIDE PROCEDURE TIME OUT CHECKLIST - NSPRESEDATIONSD_GEN_ALL_CORE
FORMS FAXED.    
Harrison called back this morning to give the fax number for the forms to be faxed to him at [phone] call routed for the nurses pool.  
without sedation

## 2022-08-19 ENCOUNTER — APPOINTMENT (OUTPATIENT)
Dept: PLASTIC SURGERY | Facility: CLINIC | Age: 72
End: 2022-08-19

## 2022-08-23 NOTE — HISTORY OF PRESENT ILLNESS
[FreeTextEntry1] : Seth Perez is a 71 y/o male s/p evacuation of hematoma from RLE on 8/12/22 after sustaining a fall a week prior. Patient states pain, swelling and erythema has improved. He admits to some dark red/brown drainage from I&D site.  Otherwise he has no other complaints and is continuing with his oral antibiotics.

## 2022-08-23 NOTE — PHYSICAL EXAM
[de-identified] : NAD, AxOx3  [de-identified] : nonlabored breathing  [de-identified] : RLE- swelling significantly improved. Superficial skin tears still present on anterior tibial area but are clean and dry. There is some dark brown drainage from the I&D site. No purulence. Erythema has improved and is only localized to the right lateral foot. There is no increased warmth to this area. There is no tenderness, nor any fluctuance or induration to this area.   [de-identified] : as above [de-identified] : normal affect

## 2022-08-26 ENCOUNTER — APPOINTMENT (OUTPATIENT)
Dept: PLASTIC SURGERY | Facility: CLINIC | Age: 72
End: 2022-08-26

## 2022-08-26 PROCEDURE — 99213 OFFICE O/P EST LOW 20 MIN: CPT

## 2022-08-29 ENCOUNTER — RX RENEWAL (OUTPATIENT)
Age: 72
End: 2022-08-29

## 2022-08-30 NOTE — HISTORY OF PRESENT ILLNESS
[FreeTextEntry1] : Seth Perez is a 71 y/o male s/p evacuation of hematoma from RLE on 8/12/22 after sustaining as fall one week prior.  Patient states pain and erythema improving but states he still has some swelling, although improved.  He states there is less drainage from evacuation site. Patient continues to deny fever chills or any other constitutional complaints.,

## 2022-08-30 NOTE — PHYSICAL EXAM
[de-identified] : NAD, AxOx3  [de-identified] : nonlabored breathing  [de-identified] : RLE- swelling improved. Superficial skin tears still present on anterior tibial area, they are stable and clean and dry with some scabbing. There was more expressible hematoma from evacuation site. Erythema less pronounced on lateral foot but still slightly present. There is no increased warmth. +DP/PT pulses  [de-identified] : as above [de-identified] : normal affect

## 2022-09-09 ENCOUNTER — APPOINTMENT (OUTPATIENT)
Dept: PLASTIC SURGERY | Facility: CLINIC | Age: 72
End: 2022-09-09

## 2022-09-09 DIAGNOSIS — S81.801A UNSPECIFIED OPEN WOUND, RIGHT LOWER LEG, INITIAL ENCOUNTER: ICD-10-CM

## 2022-09-09 DIAGNOSIS — S80.11XS CONTUSION OF RIGHT LOWER LEG, SEQUELA: ICD-10-CM

## 2022-09-09 PROCEDURE — 99212 OFFICE O/P EST SF 10 MIN: CPT

## 2022-09-12 ENCOUNTER — NON-APPOINTMENT (OUTPATIENT)
Age: 72
End: 2022-09-12

## 2022-09-12 ENCOUNTER — APPOINTMENT (OUTPATIENT)
Dept: ELECTROPHYSIOLOGY | Facility: CLINIC | Age: 72
End: 2022-09-12

## 2022-09-12 PROCEDURE — G2066: CPT

## 2022-09-12 PROCEDURE — 93298 REM INTERROG DEV EVAL SCRMS: CPT

## 2022-09-13 PROBLEM — S80.11XS LEG HEMATOMA, RIGHT, SEQUELA: Status: ACTIVE | Noted: 2022-08-12

## 2022-09-13 PROBLEM — S81.801A LEG WOUND, RIGHT: Status: ACTIVE | Noted: 2022-08-10

## 2022-09-13 NOTE — PHYSICAL EXAM
[de-identified] : NAD, AxOx3  [de-identified] : nonlabored breathing  [de-identified] : RLE- swelling has resolved with minimal present.  Superficial skin tears still present on the anterior tibial area. There is some scabbing and eschar but they are clean and dry without any evidence of infection. No drainage from hematoma site. Erythema has resolved. DP./PT pulses intact.  [de-identified] : as above  [de-identified] : normal affect

## 2022-09-13 NOTE — HISTORY OF PRESENT ILLNESS
[FreeTextEntry1] : Seth Perez is a 71 y/o male s/p evacuation of hematoma from RLE on 8/12/22 after sustaining a fall one week prior.  Patient states leg has been improving, he will complete the course of antibiotics today.  Patient denies any further drainage or pain to the leg.

## 2022-10-17 ENCOUNTER — APPOINTMENT (OUTPATIENT)
Dept: ELECTROPHYSIOLOGY | Facility: CLINIC | Age: 72
End: 2022-10-17

## 2022-10-17 ENCOUNTER — NON-APPOINTMENT (OUTPATIENT)
Age: 72
End: 2022-10-17

## 2022-10-17 PROCEDURE — G2066: CPT

## 2022-10-17 PROCEDURE — 93298 REM INTERROG DEV EVAL SCRMS: CPT

## 2022-10-18 ENCOUNTER — RX RENEWAL (OUTPATIENT)
Age: 72
End: 2022-10-18

## 2022-11-22 ENCOUNTER — APPOINTMENT (OUTPATIENT)
Dept: ELECTROPHYSIOLOGY | Facility: CLINIC | Age: 72
End: 2022-11-22
Payer: MEDICARE

## 2022-11-22 VITALS
SYSTOLIC BLOOD PRESSURE: 163 MMHG | BODY MASS INDEX: 31.78 KG/M2 | RESPIRATION RATE: 16 BRPM | HEIGHT: 71 IN | DIASTOLIC BLOOD PRESSURE: 97 MMHG | WEIGHT: 227 LBS | HEART RATE: 68 BPM | OXYGEN SATURATION: 97 %

## 2022-11-22 PROCEDURE — 93285 PRGRMG DEV EVAL SCRMS IP: CPT

## 2022-11-22 PROCEDURE — 99214 OFFICE O/P EST MOD 30 MIN: CPT | Mod: 25

## 2022-11-22 RX ORDER — UBIDECARENONE 100 MG
100 CAPSULE ORAL
Refills: 0 | Status: DISCONTINUED | COMMUNITY
Start: 2020-07-06 | End: 2022-11-22

## 2022-11-22 NOTE — HISTORY OF PRESENT ILLNESS
[FreeTextEntry1] : This is a 69-year-old man, who presents for evaluation after recurrent episodes of syncope. He underwent a telemetry monitoring that demonstrated episodes of supraventricular tachycardia. YOKASTA MCCLELLAND  denies chest pain, chest pressure, shortness of breath. He however reports an episode of palpitations with lightheadedness. He reports compliance with his meds and reduction of alcohol intake.\par  He underwent an EP study that demonstrated midline conduction without dual AV nodes or AP. He had atrial tachycardia induced that appears to be left sided by p wave morphology.  \par \par Recently he had an episode of palpitation which subsided in few minutes, he did follow up with EP and had a Loop recorded placed 1 week ago. Site clean, dry and intact. No reoccurrence. Does complaint of orthostatic hypotension. \par Had recent lab works with PCP
No

## 2022-12-08 ENCOUNTER — NON-APPOINTMENT (OUTPATIENT)
Age: 72
End: 2022-12-08

## 2022-12-08 ENCOUNTER — APPOINTMENT (OUTPATIENT)
Dept: CARDIOLOGY | Facility: CLINIC | Age: 72
End: 2022-12-08

## 2022-12-08 VITALS
SYSTOLIC BLOOD PRESSURE: 162 MMHG | WEIGHT: 234.79 LBS | DIASTOLIC BLOOD PRESSURE: 92 MMHG | BODY MASS INDEX: 32.87 KG/M2 | HEIGHT: 71 IN

## 2022-12-08 PROCEDURE — 99214 OFFICE O/P EST MOD 30 MIN: CPT | Mod: 25

## 2022-12-08 PROCEDURE — 93000 ELECTROCARDIOGRAM COMPLETE: CPT

## 2022-12-08 RX ORDER — SULFAMETHOXAZOLE AND TRIMETHOPRIM 800; 160 MG/1; MG/1
800-160 TABLET ORAL TWICE DAILY
Qty: 14 | Refills: 0 | Status: DISCONTINUED | COMMUNITY
Start: 2022-08-31 | End: 2022-12-08

## 2022-12-08 RX ORDER — SULFAMETHOXAZOLE AND TRIMETHOPRIM 800; 160 MG/1; MG/1
800-160 TABLET ORAL TWICE DAILY
Qty: 14 | Refills: 0 | Status: DISCONTINUED | COMMUNITY
Start: 2022-08-19 | End: 2022-12-08

## 2022-12-08 RX ORDER — LIDOCAINE 5% 700 MG/1
5 PATCH TOPICAL
Qty: 30 | Refills: 1 | Status: DISCONTINUED | COMMUNITY
Start: 2022-05-17 | End: 2022-12-08

## 2022-12-08 RX ORDER — SULFAMETHOXAZOLE AND TRIMETHOPRIM 800; 160 MG/1; MG/1
800-160 TABLET ORAL TWICE DAILY
Qty: 20 | Refills: 0 | Status: DISCONTINUED | COMMUNITY
Start: 2022-01-24 | End: 2022-12-08

## 2022-12-08 RX ORDER — SULFAMETHOXAZOLE AND TRIMETHOPRIM 800; 160 MG/1; MG/1
800-160 TABLET ORAL TWICE DAILY
Qty: 14 | Refills: 0 | Status: DISCONTINUED | COMMUNITY
Start: 2022-08-26 | End: 2022-12-08

## 2022-12-08 RX ORDER — LISINOPRIL 10 MG/1
10 TABLET ORAL TWICE DAILY
Qty: 180 | Refills: 1 | Status: DISCONTINUED | COMMUNITY
Start: 2021-05-13 | End: 2022-12-08

## 2022-12-08 RX ORDER — CLOTRIMAZOLE AND BETAMETHASONE DIPROPIONATE 10; .5 MG/G; MG/G
1-0.05 CREAM TOPICAL TWICE DAILY
Qty: 1 | Refills: 1 | Status: DISCONTINUED | COMMUNITY
Start: 2022-04-13 | End: 2022-12-08

## 2022-12-08 RX ORDER — HYDROCORTISONE 25 MG/G
2.5 CREAM TOPICAL TWICE DAILY
Qty: 1 | Refills: 0 | Status: DISCONTINUED | COMMUNITY
Start: 2022-03-21 | End: 2022-12-08

## 2022-12-08 RX ORDER — SULFAMETHOXAZOLE AND TRIMETHOPRIM 800; 160 MG/1; MG/1
800-160 TABLET ORAL TWICE DAILY
Qty: 14 | Refills: 0 | Status: DISCONTINUED | COMMUNITY
Start: 2022-09-09 | End: 2022-12-08

## 2022-12-08 RX ORDER — OXYCODONE AND ACETAMINOPHEN 5; 325 MG/1; MG/1
5-325 TABLET ORAL
Qty: 12 | Refills: 0 | Status: DISCONTINUED | COMMUNITY
Start: 2022-08-15 | End: 2022-12-08

## 2022-12-08 RX ORDER — NYSTATIN 100000 [USP'U]/G
100000 CREAM TOPICAL 3 TIMES DAILY
Qty: 1 | Refills: 0 | Status: DISCONTINUED | COMMUNITY
Start: 2022-03-21 | End: 2022-12-08

## 2022-12-08 RX ORDER — SULFAMETHOXAZOLE AND TRIMETHOPRIM 800; 160 MG/1; MG/1
800-160 TABLET ORAL TWICE DAILY
Qty: 14 | Refills: 0 | Status: DISCONTINUED | COMMUNITY
Start: 2022-01-20 | End: 2022-12-08

## 2022-12-08 NOTE — PHYSICAL EXAM
[Well Developed] : well developed [Well Nourished] : well nourished [No Acute Distress] : no acute distress [Normal Venous Pressure] : normal venous pressure [No Carotid Bruit] : no carotid bruit [Normal S1, S2] : normal S1, S2 [No Rub] : no rub [No Gallop] : no gallop [Clear Lung Fields] : clear lung fields [Good Air Entry] : good air entry [No Respiratory Distress] : no respiratory distress  [Soft] : abdomen soft [Non Tender] : non-tender [No Masses/organomegaly] : no masses/organomegaly [Normal Bowel Sounds] : normal bowel sounds [Normal Gait] : normal gait [No Edema] : no edema [No Cyanosis] : no cyanosis [No Clubbing] : no clubbing [No Varicosities] : no varicosities [No Rash] : no rash [No Skin Lesions] : no skin lesions [Moves all extremities] : moves all extremities [No Focal Deficits] : no focal deficits [Normal Speech] : normal speech [Alert and Oriented] : alert and oriented [Normal memory] : normal memory [General Appearance - Well Developed] : well developed [Normal Appearance] : normal appearance [Well Groomed] : well groomed [General Appearance - Well Nourished] : well nourished [No Deformities] : no deformities [General Appearance - In No Acute Distress] : no acute distress [Normal Conjunctiva] : the conjunctiva exhibited no abnormalities [Eyelids - No Xanthelasma] : the eyelids demonstrated no xanthelasmas [Normal Oral Mucosa] : normal oral mucosa [No Oral Pallor] : no oral pallor [No Oral Cyanosis] : no oral cyanosis [Normal Jugular Venous A Waves Present] : normal jugular venous A waves present [Normal Jugular Venous V Waves Present] : normal jugular venous V waves present [No Jugular Venous Fitzgerald A Waves] : no jugular venous fitzgerald A waves [Respiration, Rhythm And Depth] : normal respiratory rhythm and effort [Exaggerated Use Of Accessory Muscles For Inspiration] : no accessory muscle use [Auscultation Breath Sounds / Voice Sounds] : lungs were clear to auscultation bilaterally [Abdomen Soft] : soft [Abdomen Tenderness] : non-tender [Abdomen Mass (___ Cm)] : no abdominal mass palpated [Abnormal Walk] : normal gait [Gait - Sufficient For Exercise Testing] : the gait was sufficient for exercise testing [Nail Clubbing] : no clubbing of the fingernails [Cyanosis, Localized] : no localized cyanosis [Petechial Hemorrhages (___cm)] : no petechial hemorrhages [Skin Color & Pigmentation] : normal skin color and pigmentation [] : no rash [No Venous Stasis] : no venous stasis [Skin Lesions] : no skin lesions [No Skin Ulcers] : no skin ulcer [No Xanthoma] : no  xanthoma was observed [Oriented To Time, Place, And Person] : oriented to person, place, and time [Affect] : the affect was normal [Mood] : the mood was normal [No Anxiety] : not feeling anxious [Normal Rate] : normal [Normal S1] : normal S1 [Normal S2] : normal S2 [S3] : no S3 [S4] : no S4 [No Murmur] : no murmurs heard [Right Carotid Bruit] : no bruit heard over the right carotid [Left Carotid Bruit] : no bruit heard over the left carotid [Right Femoral Bruit] : no bruit heard over the right femoral artery [Left Femoral Bruit] : no bruit heard over the left femoral artery [2+] : left 2+ [No Abnormalities] : the abdominal aorta was not enlarged and no bruit was heard [No Pitting Edema] : no pitting edema present

## 2022-12-08 NOTE — HISTORY OF PRESENT ILLNESS
[FreeTextEntry1] : This is a 72 year-old man, who presents for evaluation after recurrent episodes of syncope. He underwent a telemetry monitoring that demonstrated episodes of supraventricular tachycardia. He  denies chest pain, chest pressure, shortness of breath. H/O dilated aorta. EP notes were reviewed.

## 2022-12-08 NOTE — ASSESSMENT
[FreeTextEntry1] : This is a 72 -year-old gentleman with episodes of tachycardia. ECG was reviewed. Reports from EP were evaluated. Pt will follow up with Dr. Lugo. He will notify us if he experiences any symptoms \par Pt will follow up with repeat CT angio.

## 2022-12-13 NOTE — CARDIOLOGY SUMMARY
[de-identified] : 7/7/2020 : mild MR, mild , AI, mod Ao root dilatation, DHARA, Normal LV systolic function, mild diastolic dysfunction, Normal RV size and function.

## 2022-12-13 NOTE — HISTORY OF PRESENT ILLNESS
[FreeTextEntry1] : This is a 72 year old man with recurrent episodes of SVT. He is s/p ablation of a alejandro terminalis atrial tachycardia on April 29, 2021.  YOKASTA MCCLELLAND  denies chest pain, chest pressure, shortness of breath, lightheadedness, dizziness, palpitations, syncope, presyncope, orthopnea, PND, or edema.   He had a fall/syncope earlier this year,.

## 2022-12-14 ENCOUNTER — APPOINTMENT (OUTPATIENT)
Dept: CT IMAGING | Facility: CLINIC | Age: 72
End: 2022-12-14
Payer: MEDICARE

## 2022-12-14 PROCEDURE — 71275 CT ANGIOGRAPHY CHEST: CPT

## 2022-12-19 ENCOUNTER — NON-APPOINTMENT (OUTPATIENT)
Age: 72
End: 2022-12-19

## 2022-12-26 ENCOUNTER — APPOINTMENT (OUTPATIENT)
Dept: ELECTROPHYSIOLOGY | Facility: CLINIC | Age: 72
End: 2022-12-26
Payer: MEDICARE

## 2022-12-27 ENCOUNTER — NON-APPOINTMENT (OUTPATIENT)
Age: 72
End: 2022-12-27

## 2022-12-27 PROCEDURE — 93298 REM INTERROG DEV EVAL SCRMS: CPT

## 2022-12-27 PROCEDURE — G2066: CPT

## 2023-01-07 NOTE — DISCHARGE NOTE ADULT - CARE PROVIDER_API CALL
Bang Lugo), Cardiac Electrophysiology; Cardiovascular Disease  [address] [place] [address]  Phone: [phone]  Fax: [phone]

## 2023-01-09 ENCOUNTER — NON-APPOINTMENT (OUTPATIENT)
Age: 73
End: 2023-01-09

## 2023-01-13 ENCOUNTER — NON-APPOINTMENT (OUTPATIENT)
Age: 73
End: 2023-01-13

## 2023-01-25 ENCOUNTER — APPOINTMENT (OUTPATIENT)
Dept: INTERNAL MEDICINE | Facility: CLINIC | Age: 73
End: 2023-01-25
Payer: MEDICARE

## 2023-01-25 VITALS
WEIGHT: 231 LBS | RESPIRATION RATE: 16 BRPM | HEART RATE: 68 BPM | OXYGEN SATURATION: 94 % | HEIGHT: 71 IN | TEMPERATURE: 97.7 F | BODY MASS INDEX: 32.34 KG/M2

## 2023-01-25 VITALS — SYSTOLIC BLOOD PRESSURE: 140 MMHG | DIASTOLIC BLOOD PRESSURE: 82 MMHG

## 2023-01-25 PROCEDURE — 99214 OFFICE O/P EST MOD 30 MIN: CPT

## 2023-01-25 NOTE — REVIEW OF SYSTEMS
[Fever] : no fever [Chills] : no chills [Chest Pain] : no chest pain [Palpitations] : no palpitations [Lower Ext Edema] : no lower extremity edema [Shortness Of Breath] : no shortness of breath [Wheezing] : no wheezing [Cough] : no cough [Abdominal Pain] : no abdominal pain [Dizziness] : no dizziness [Depression] : no depression [Easy Bruising] : no easy bruising

## 2023-01-25 NOTE — HISTORY OF PRESENT ILLNESS
[de-identified] : Pt comes for episode of bronchitis for which he was on AUgmentin and prednisone  During that time he had episode of mild lightheaded ness which reminded him of his arrhythmia  He was told to go on ELiquis by terrance but didn’t because he didn’t feel bad anymore  Has felt well since that mild episode

## 2023-01-25 NOTE — PHYSICAL EXAM
[No Acute Distress] : no acute distress [Well Nourished] : well nourished [Normal Sclera/Conjunctiva] : normal sclera/conjunctiva [PERRL] : pupils equal round and reactive to light [Normal Outer Ear/Nose] : the outer ears and nose were normal in appearance [No JVD] : no jugular venous distention [No Lymphadenopathy] : no lymphadenopathy [No Respiratory Distress] : no respiratory distress  [No Accessory Muscle Use] : no accessory muscle use [Normal Rate] : normal rate  [Regular Rhythm] : with a regular rhythm [No Edema] : there was no peripheral edema [No Extremity Clubbing/Cyanosis] : no extremity clubbing/cyanosis [Soft] : abdomen soft [Non Tender] : non-tender [Non-distended] : non-distended [No Masses] : no abdominal mass palpated [Grossly Normal Strength/Tone] : grossly normal strength/tone [No Focal Deficits] : no focal deficits [Normal Gait] : normal gait [Normal Affect] : the affect was normal

## 2023-01-25 NOTE — PLAN
[FreeTextEntry1] : I advised complete blood work to asses for any pproblems  I told pt that eliquis is much better at stroke prevention rather than doubling baby asa and he should discuss his risk of stroke with his caridologist also

## 2023-01-31 ENCOUNTER — APPOINTMENT (OUTPATIENT)
Dept: ELECTROPHYSIOLOGY | Facility: CLINIC | Age: 73
End: 2023-01-31
Payer: MEDICARE

## 2023-01-31 ENCOUNTER — NON-APPOINTMENT (OUTPATIENT)
Age: 73
End: 2023-01-31

## 2023-01-31 PROCEDURE — G2066: CPT

## 2023-01-31 PROCEDURE — 93298 REM INTERROG DEV EVAL SCRMS: CPT

## 2023-03-06 ENCOUNTER — NON-APPOINTMENT (OUTPATIENT)
Age: 73
End: 2023-03-06

## 2023-03-06 ENCOUNTER — APPOINTMENT (OUTPATIENT)
Dept: ELECTROPHYSIOLOGY | Facility: CLINIC | Age: 73
End: 2023-03-06
Payer: MEDICARE

## 2023-03-06 PROCEDURE — G2066: CPT

## 2023-03-06 PROCEDURE — 93298 REM INTERROG DEV EVAL SCRMS: CPT

## 2023-03-07 NOTE — ASU DISCHARGE PLAN (ADULT/PEDIATRIC) - ASU DC REMOVE DRESSINGFT
Dr Escobedo came to bedside and discussed findings. NO N/V,  no abdominal pain, no GI bleeding, and vitals stable.  Pt discharged from unit.    48

## 2023-03-23 ENCOUNTER — RX RENEWAL (OUTPATIENT)
Age: 73
End: 2023-03-23

## 2023-03-23 RX ORDER — GLIPIZIDE AND METFORMIN HYDROCHLORIDE 2.5; 5 MG/1; MG/1
2.5-5 TABLET, FILM COATED ORAL
Qty: 180 | Refills: 1 | Status: ACTIVE | COMMUNITY
Start: 2019-09-09 | End: 1900-01-01

## 2023-03-26 NOTE — ASU PREOP CHECKLIST - MEDICAL/PEDIATRIC CLEARANCE ON MEDICAL RECORD
Problem: Discharge Planning  Goal: Discharge to home or other facility with appropriate resources  Outcome: Progressing     Problem: Skin/Tissue Integrity  Goal: Absence of new skin breakdown  Description: 1. Monitor for areas of redness and/or skin breakdown  2. Assess vascular access sites hourly  3. Every 4-6 hours minimum:  Change oxygen saturation probe site  4. Every 4-6 hours:  If on nasal continuous positive airway pressure, respiratory therapy assess nares and determine need for appliance change or resting period.   Outcome: Progressing     Problem: Safety - Adult  Goal: Free from fall injury  Outcome: Progressing medical/cardiac

## 2023-04-10 ENCOUNTER — APPOINTMENT (OUTPATIENT)
Dept: ELECTROPHYSIOLOGY | Facility: CLINIC | Age: 73
End: 2023-04-10
Payer: MEDICARE

## 2023-04-10 ENCOUNTER — NON-APPOINTMENT (OUTPATIENT)
Age: 73
End: 2023-04-10

## 2023-04-10 PROCEDURE — 93298 REM INTERROG DEV EVAL SCRMS: CPT

## 2023-04-10 PROCEDURE — G2066: CPT

## 2023-04-12 ENCOUNTER — APPOINTMENT (OUTPATIENT)
Dept: INTERNAL MEDICINE | Facility: CLINIC | Age: 73
End: 2023-04-12
Payer: MEDICARE

## 2023-04-12 VITALS
HEART RATE: 56 BPM | OXYGEN SATURATION: 96 % | WEIGHT: 226 LBS | BODY MASS INDEX: 31.64 KG/M2 | TEMPERATURE: 98 F | RESPIRATION RATE: 15 BRPM | HEIGHT: 71 IN

## 2023-04-12 VITALS — SYSTOLIC BLOOD PRESSURE: 140 MMHG | DIASTOLIC BLOOD PRESSURE: 82 MMHG

## 2023-04-12 DIAGNOSIS — I47.1 SUPRAVENTRICULAR TACHYCARDIA: ICD-10-CM

## 2023-04-12 DIAGNOSIS — R00.2 PALPITATIONS: ICD-10-CM

## 2023-04-12 PROCEDURE — 36415 COLL VENOUS BLD VENIPUNCTURE: CPT

## 2023-04-12 PROCEDURE — 99214 OFFICE O/P EST MOD 30 MIN: CPT | Mod: 25

## 2023-04-12 NOTE — PHYSICAL EXAM
[No Acute Distress] : no acute distress [Normal Sclera/Conjunctiva] : normal sclera/conjunctiva [Normal Outer Ear/Nose] : the outer ears and nose were normal in appearance [No JVD] : no jugular venous distention [No Respiratory Distress] : no respiratory distress  [No Accessory Muscle Use] : no accessory muscle use [Clear to Auscultation] : lungs were clear to auscultation bilaterally [Normal Rate] : normal rate  [Regular Rhythm] : with a regular rhythm [No Carotid Bruits] : no carotid bruits [No Edema] : there was no peripheral edema [No Extremity Clubbing/Cyanosis] : no extremity clubbing/cyanosis [Soft] : abdomen soft [Non Tender] : non-tender [Non-distended] : non-distended [Normal Bowel Sounds] : normal bowel sounds [No Focal Deficits] : no focal deficits [Memory Grossly Normal] : memory grossly normal [Normal Affect] : the affect was normal [Normal Insight/Judgement] : insight and judgment were intact

## 2023-04-12 NOTE — REVIEW OF SYSTEMS
[Joint Pain] : joint pain [Joint Stiffness] : joint stiffness [Fever] : no fever [Chills] : no chills [Chest Pain] : no chest pain [Palpitations] : no palpitations [Lower Ext Edema] : no lower extremity edema [Shortness Of Breath] : no shortness of breath [Wheezing] : no wheezing [Cough] : no cough [Abdominal Pain] : no abdominal pain [Diarrhea] : no diarrhea [Joint Swelling] : no joint swelling

## 2023-04-12 NOTE — HEALTH RISK ASSESSMENT
[0] : 2) Feeling down, depressed, or hopeless: Not at all (0) [PHQ-2 Negative - No further assessment needed] : PHQ-2 Negative - No further assessment needed [[id number]] : 0

## 2023-04-22 LAB
ALBUMIN SERPL ELPH-MCNC: 4.8 G/DL
ALP BLD-CCNC: 72 U/L
ALT SERPL-CCNC: 93 U/L
ANION GAP SERPL CALC-SCNC: 17 MMOL/L
AST SERPL-CCNC: 66 U/L
BASOPHILS # BLD AUTO: 0.09 K/UL
BASOPHILS NFR BLD AUTO: 1.5 %
BILIRUB SERPL-MCNC: 0.6 MG/DL
BUN SERPL-MCNC: 13 MG/DL
CALCIUM SERPL-MCNC: 9.9 MG/DL
CHLORIDE SERPL-SCNC: 95 MMOL/L
CHOLEST SERPL-MCNC: 255 MG/DL
CO2 SERPL-SCNC: 23 MMOL/L
CREAT SERPL-MCNC: 0.8 MG/DL
CREAT SPEC-SCNC: 125 MG/DL
EGFR: 93 ML/MIN/1.73M2
EOSINOPHIL # BLD AUTO: 0.54 K/UL
EOSINOPHIL NFR BLD AUTO: 9 %
ESTIMATED AVERAGE GLUCOSE: 146 MG/DL
GLUCOSE SERPL-MCNC: 179 MG/DL
HBA1C MFR BLD HPLC: 6.7 %
HCT VFR BLD CALC: 45.4 %
HDLC SERPL-MCNC: 66 MG/DL
HGB BLD-MCNC: 14.9 G/DL
IMM GRANULOCYTES NFR BLD AUTO: 0.2 %
LDLC SERPL CALC-MCNC: 177 MG/DL
LYMPHOCYTES # BLD AUTO: 2.43 K/UL
LYMPHOCYTES NFR BLD AUTO: 40.3 %
MAN DIFF?: NORMAL
MCHC RBC-ENTMCNC: 32.8 GM/DL
MCHC RBC-ENTMCNC: 33.5 PG
MCV RBC AUTO: 102 FL
MICROALBUMIN 24H UR DL<=1MG/L-MCNC: <1.2 MG/DL
MICROALBUMIN/CREAT 24H UR-RTO: NORMAL MG/G
MONOCYTES # BLD AUTO: 0.58 K/UL
MONOCYTES NFR BLD AUTO: 9.6 %
NEUTROPHILS # BLD AUTO: 2.38 K/UL
NEUTROPHILS NFR BLD AUTO: 39.4 %
NONHDLC SERPL-MCNC: 189 MG/DL
PLATELET # BLD AUTO: 284 K/UL
POTASSIUM SERPL-SCNC: 4.6 MMOL/L
PROT SERPL-MCNC: 7.8 G/DL
PSA SERPL-MCNC: 1.3 NG/ML
RBC # BLD: 4.45 M/UL
RBC # FLD: 13.2 %
SODIUM SERPL-SCNC: 134 MMOL/L
TRIGL SERPL-MCNC: 62 MG/DL
TSH SERPL-ACNC: 1.19 UIU/ML
WBC # FLD AUTO: 6.03 K/UL

## 2023-04-24 ENCOUNTER — NON-APPOINTMENT (OUTPATIENT)
Age: 73
End: 2023-04-24

## 2023-05-23 ENCOUNTER — APPOINTMENT (OUTPATIENT)
Dept: ELECTROPHYSIOLOGY | Facility: CLINIC | Age: 73
End: 2023-05-23
Payer: MEDICARE

## 2023-05-23 VITALS
RESPIRATION RATE: 16 BRPM | BODY MASS INDEX: 30.94 KG/M2 | DIASTOLIC BLOOD PRESSURE: 72 MMHG | OXYGEN SATURATION: 95 % | HEART RATE: 87 BPM | SYSTOLIC BLOOD PRESSURE: 106 MMHG | HEIGHT: 71 IN | WEIGHT: 221 LBS

## 2023-05-23 PROCEDURE — 99214 OFFICE O/P EST MOD 30 MIN: CPT

## 2023-05-23 PROCEDURE — 93285 PRGRMG DEV EVAL SCRMS IP: CPT

## 2023-05-23 RX ORDER — APIXABAN 5 MG/1
5 TABLET, FILM COATED ORAL
Qty: 60 | Refills: 2 | Status: DISCONTINUED | COMMUNITY
Start: 2022-12-16 | End: 2023-05-23

## 2023-05-23 RX ORDER — CEFADROXIL 500 MG/1
500 CAPSULE ORAL TWICE DAILY
Qty: 14 | Refills: 0 | Status: DISCONTINUED | COMMUNITY
Start: 2022-01-24 | End: 2023-05-23

## 2023-06-21 ENCOUNTER — EMERGENCY (EMERGENCY)
Facility: HOSPITAL | Age: 73
LOS: 1 days | Discharge: HOME | End: 2023-06-21
Attending: STUDENT IN AN ORGANIZED HEALTH CARE EDUCATION/TRAINING PROGRAM | Admitting: STUDENT IN AN ORGANIZED HEALTH CARE EDUCATION/TRAINING PROGRAM
Payer: MEDICARE

## 2023-06-21 ENCOUNTER — EMERGENCY (EMERGENCY)
Facility: HOSPITAL | Age: 73
LOS: 1 days | Discharge: HOME | End: 2023-06-21
Attending: EMERGENCY MEDICINE | Admitting: EMERGENCY MEDICINE
Payer: MEDICARE

## 2023-06-21 VITALS
HEIGHT: 71 IN | OXYGEN SATURATION: 99 % | DIASTOLIC BLOOD PRESSURE: 88 MMHG | WEIGHT: 220.02 LBS | SYSTOLIC BLOOD PRESSURE: 148 MMHG | HEART RATE: 72 BPM | RESPIRATION RATE: 18 BRPM | TEMPERATURE: 98 F

## 2023-06-21 VITALS
RESPIRATION RATE: 18 BRPM | DIASTOLIC BLOOD PRESSURE: 84 MMHG | OXYGEN SATURATION: 97 % | SYSTOLIC BLOOD PRESSURE: 127 MMHG | HEART RATE: 75 BPM | TEMPERATURE: 98 F

## 2023-06-21 VITALS
RESPIRATION RATE: 18 BRPM | WEIGHT: 230.82 LBS | OXYGEN SATURATION: 96 % | HEART RATE: 69 BPM | SYSTOLIC BLOOD PRESSURE: 135 MMHG | DIASTOLIC BLOOD PRESSURE: 84 MMHG | TEMPERATURE: 98 F

## 2023-06-21 DIAGNOSIS — Z95.818 PRESENCE OF OTHER CARDIAC IMPLANTS AND GRAFTS: Chronic | ICD-10-CM

## 2023-06-21 DIAGNOSIS — Z98.890 OTHER SPECIFIED POSTPROCEDURAL STATES: Chronic | ICD-10-CM

## 2023-06-21 DIAGNOSIS — Z98.89 OTHER SPECIFIED POSTPROCEDURAL STATES: Chronic | ICD-10-CM

## 2023-06-21 PROCEDURE — 30901 CONTROL OF NOSEBLEED: CPT | Mod: LT

## 2023-06-21 PROCEDURE — 99284 EMERGENCY DEPT VISIT MOD MDM: CPT

## 2023-06-21 PROCEDURE — 99283 EMERGENCY DEPT VISIT LOW MDM: CPT | Mod: 25

## 2023-06-21 PROCEDURE — 30903 CONTROL OF NOSEBLEED: CPT

## 2023-06-21 PROCEDURE — 30901 CONTROL OF NOSEBLEED: CPT

## 2023-06-21 PROCEDURE — 99283 EMERGENCY DEPT VISIT LOW MDM: CPT

## 2023-06-21 PROCEDURE — 99284 EMERGENCY DEPT VISIT MOD MDM: CPT | Mod: 25

## 2023-06-21 PROCEDURE — 30901 CONTROL OF NOSEBLEED: CPT | Mod: 59,LT

## 2023-06-21 RX ORDER — DULOXETINE HYDROCHLORIDE 30 MG/1
20 CAPSULE, DELAYED RELEASE ORAL
Qty: 0 | Refills: 0 | DISCHARGE

## 2023-06-21 RX ORDER — CEPHALEXIN 500 MG
500 CAPSULE ORAL ONCE
Refills: 0 | Status: COMPLETED | OUTPATIENT
Start: 2023-06-21 | End: 2023-06-21

## 2023-06-21 RX ORDER — GLIPIZIDE/METFORMIN HCL 2.5-500 MG
1 TABLET ORAL
Qty: 0 | Refills: 0 | DISCHARGE

## 2023-06-21 RX ORDER — FINASTERIDE 5 MG/1
1 TABLET, FILM COATED ORAL
Qty: 0 | Refills: 0 | DISCHARGE

## 2023-06-21 RX ORDER — CEPHALEXIN 500 MG
1 CAPSULE ORAL
Qty: 14 | Refills: 0
Start: 2023-06-21 | End: 2023-06-27

## 2023-06-21 RX ORDER — ALFUZOSIN HYDROCHLORIDE 10 MG/1
1 TABLET, EXTENDED RELEASE ORAL
Qty: 0 | Refills: 0 | DISCHARGE

## 2023-06-21 RX ORDER — MIRABEGRON 50 MG/1
1 TABLET, EXTENDED RELEASE ORAL
Qty: 0 | Refills: 0 | DISCHARGE

## 2023-06-21 RX ORDER — TRANEXAMIC ACID 100 MG/ML
5 INJECTION, SOLUTION INTRAVENOUS ONCE
Refills: 0 | Status: COMPLETED | OUTPATIENT
Start: 2023-06-21 | End: 2023-06-21

## 2023-06-21 RX ADMIN — TRANEXAMIC ACID 5 MILLILITER(S): 100 INJECTION, SOLUTION INTRAVENOUS at 05:45

## 2023-06-21 RX ADMIN — Medication 500 MILLIGRAM(S): at 06:28

## 2023-06-21 NOTE — ED PROVIDER NOTE - NSFOLLOWUPINSTRUCTIONS_ED_ALL_ED_FT
Please take the medication as prescribed and follow up with ENT and cardiology.  Continue taking your Xarelto. Do not forcefully blow your nose. Return to the ER for persistent bleeding, dizziness, nasal pain, headache, fever, or any other concerns.     Nosebleed, Adult    A nosebleed is when blood comes out of the nose. Nosebleeds are common. Usually, they are not a sign of a serious condition.    Nosebleeds can happen if a blood vessel in your nose starts to bleed or if the lining of your nose (mucous membrane) cracks. They are commonly caused by:    Allergies.  Colds.  Picking your nose.  Blowing your nose too hard.  An injury from sticking an object into your nose or getting hit in the nose.  Dry or cold air.    Less common causes of nosebleeds include:    Toxic fumes.  Something abnormal in the nose or in the air-filled spaces in the bones of the face (sinuses).  Growths in the nose, such as polyps.  Blood thinners or conditions that cause blood to clot slowly.  Certain illnesses or procedures that irritate or dry out the nasal passages.    Follow these instructions at home:      When you have a nosebleed:     Sit down and tilt your head slightly forward.  Use a clean towel or tissue to pinch your nostrils under the bony part of your nose. After 5 minutes, let go of your nose and see if bleeding starts again. Do not release pressure before that time. If there is still bleeding, repeat the pinching and holding for 5 minutes or until the bleeding stops.  Do not place tissues or gauze in the nose to stop the bleeding.  Avoid lying down and avoid tilting your head backward. That may make blood collect in the throat and cause gagging or coughing.  Use a nasal spray decongestant to help with a nosebleed as told by your health care provider.        After a nosebleed:    Avoid blowing your nose or sniffing for a number of hours.  Avoid straining, lifting, or bending at the waist for several days. You may go back to other normal activities as you are able.  If you are taking aspirin or blood thinners and you have nosebleeds, talk to your health care provider. These medicines make bleeding more likely.    Ask your health care provider if you should stop taking the medicines or if you should adjust the dose.  Do not stop taking medicines that your health care provider has recommended unless he or she tells you to stop taking them.  If your nosebleed was caused by dry mucous membranes, use over-the-counter saline nasal spray or gel and a humidifier as told by your health care provider. This will keep the mucous membranes moist and allow them to heal. If you need to use one of these products:    Choose one that is water-soluble.  Use only as much as you need and use it only as often as needed.  Do not lie down right after you use it.  If you get nosebleeds often, talk with your health care provider about medical treatments. Options may include:    Nasal cautery. This treatment stops and prevents nosebleeds by using a chemical swab or electrical device to lightly burn tiny blood vessels inside the nose.  Nasal packing. A gauze or other material is placed in the nose to keep constant pressure on the bleeding area.    Contact a health care provider if you:  Have a fever.  Get nosebleeds often or more often than usual.  Bruise very easily.  Have a nosebleed from having something stuck in your nose.  Have bleeding in your mouth.  Vomit or cough up brown material.  Have a nosebleed after you start a new medicine.    Get help right away if:  You have a nosebleed after a fall or a head injury.  Your nosebleed does not go away after 20 minutes.  You feel dizzy or weak.  You have unusual bleeding from other parts of your body.  You have unusual bruising on other parts of your body.  You become sweaty.  You vomit blood.    Summary  A nosebleed is when blood comes out of the nose. Common causes include allergies, an injury to the nose, or cold or dry air.  Initial treatment includes applying pressure for 5 minutes.   Moisturizing the nose with saline nasal spray or gel after a nosebleed may help prevent future bleeding.  Get help right away if your nosebleed does not go away after 20 minutes.    ADDITIONAL NOTES AND INSTRUCTIONS    Please follow up with your Primary MD in 24-48 hr.  Seek immediate medical care for any new/worsening signs or symptoms.

## 2023-06-21 NOTE — ED ADULT NURSE NOTE - ISOLATION TYPE:
Application Tool (Optional): Liquid Nitrogen Sprayer Render Note In Bullet Format When Appropriate: No Duration Of Freeze Thaw-Cycle (Seconds): 2 Post-Care Instructions: I reviewed with the patient in detail post-care instructions. Patient is to wear sunprotection, and avoid picking at any of the treated lesions. Pt may apply Vaseline to crusted or scabbing areas. Detail Level: Detailed Number Of Freeze-Thaw Cycles: 3 freeze-thaw cycles Show Applicator Variable?: Yes Consent: The patient's consent was obtained including but not limited to risks of crusting, scabbing, blistering, scarring, darker or lighter pigmentary change, recurrence, incomplete removal and infection. None

## 2023-06-21 NOTE — ED ADULT NURSE NOTE - CHPI ED NUR DURATION
4 PTA/hour(s) Was The Patient On Physician Recommended Anticoagulation Therapy?: Please Select the Appropriate Response

## 2023-06-21 NOTE — ED PROVIDER NOTE - PATIENT PORTAL LINK FT
You can access the FollowMyHealth Patient Portal offered by [facility] by registering at the following website: [email or web address]. By joining [employer]’s FollowMyHealth portal, you will also be able to view your health information using other applications (apps) compatible with our system.

## 2023-06-21 NOTE — ED PROVIDER NOTE - PROGRESS NOTE DETAILS
Patient tolerated Rhino Rocket insertion well.  Patient observed x1 hour.  No further episodes of bleeding and patient states he feels well.  Will give antibiotics.  Advised to follow-up with ENT and cardiology.  Patient told he must continue taking Xarelto and discuss any changes with cardiology.  Patient expressed understanding.  Daughter at bedside to take patient home.

## 2023-06-21 NOTE — ED PROVIDER NOTE - CLINICAL SUMMARY MEDICAL DECISION MAKING FREE TEXT BOX
73 year old male recently dx with A-fib started on Xarelto 3 weeks ago p/w left nostril epistaxis persistent x 4 hours.  TXA, nasal packing, observe, abx, ENT follow up

## 2023-06-21 NOTE — ED PROCEDURE NOTE - PROCEDURE NAME, MLM
HPI   2year-old female presents with mom and grandmother due to concern for vaginal irritation. Mom states patient was at her father's house and when she returned was complaining of rectal pain. They looked at her rectum they noticed a skin tag and also looked at her vaginal area and noticed some redness and irritation. Grandma reports green discharge from the vaginal area. No known injury or trauma. No history of bubble baths. Mom requesting forensic examination. Denies nausea, vomiting, abdominal pain, diarrhea, constipation, dysuria or hematuria. No recent illness. No recent medications. Past Medical History:   Diagnosis Date    Congenital blocked tear duct of left eye 2016    Eye drainage 2016    Hernia     Ill-defined condition     Chronic constipation    Infantile seborrheic dermatitis 2016       No past surgical history on file.       Family History:   Problem Relation Age of Onset    Anemia Mother         Copied from mother's history at birth   [employer] Asthma Mother         Copied from mother's history at birth   [employer] Eczema Mother     Eczema Father        Social History     Socioeconomic History    Marital status: SINGLE     Spouse name: Not on file    Number of children: Not on file    Years of education: Not on file    Highest education level: Not on file   Occupational History    Not on file   Social Needs    Financial resource strain: Not on file    Food insecurity     Worry: Not on file     Inability: Not on file    Transportation needs     Medical: Not on file     Non-medical: Not on file   Tobacco Use    Smoking status: Never Smoker   Substance and Sexual Activity    Alcohol use: Not on file    Drug use: Not on file    Sexual activity: Not on file   Lifestyle    Physical activity     Days per week: Not on file     Minutes per session: Not on file    Stress: Not on file   Relationships    Social connections     Talks on phone: Not on file     Gets together: Not on file     Attends Alevism service: Not on file     Active member of club or organization: Not on file     Attends meetings of clubs or organizations: Not on file     Relationship status: Not on file    Intimate partner violence     Fear of current or ex partner: Not on file     Emotionally abused: Not on file     Physically abused: Not on file     Forced sexual activity: Not on file   Other Topics Concern    Not on file   Social History Narrative    ** Merged History Encounter **              ALLERGIES: Peanut    Review of Systems   Constitutional: Negative for chills and fever. HENT: Negative for sore throat. Respiratory: Negative for cough. Gastrointestinal: Negative for abdominal pain, anal bleeding, blood in stool, constipation, diarrhea, nausea and vomiting. Genitourinary: Positive for vaginal discharge. Negative for dysuria, hematuria and vaginal bleeding. Skin: Positive for rash. All other systems reviewed and are negative. Vitals:    02/20/21 2309   BP: 125/81   Pulse: 98   Resp: 16   Temp: 98.2 °F (36.8 °C)   SpO2: 100%   Weight: 22.5 kg            Physical Exam   GEN:  Nontoxic child, alert, active, consolable. Appears well hydrated. SKIN:  Warm and dry, no rashes. No petechia. Good skin turgor. HEENT:  Normocephalic. Oral mucosa moist, pharynx clear; TM's clear. NECK:  Supple. No adenopathy. HEART:  Regular rate and rhythm for age, no murmur  LUNGS:  Normal inspiratory effort, lungs clear to auscultation bilaterally  ABD:  Normoactive bowel sounds. Soft, non-tender. : mild vaginal irritation with small amount of clear discharge, no perirectal rash-small skin tag 12 o'clock  EXT:  Moves all extremities well. No gross deformities  NEURO: Alert, interactive and age appropriate behavior. No gross neurological deficits.   MDM  Number of Diagnoses or Management Options     Amount and/or Complexity of Data Reviewed  Clinical lab tests: ordered and reviewed  Obtain history from someone other than the patient: yes (Mother, grandmother)  Discuss the patient with other providers: yes (Forensic nurse)    Patient Progress  Patient progress: stable         Procedures    Patient seen and examined by forensic nurse examiner. Will treat UTI with Keflex. Patient to follow-up with PCP or return to ED for worsening symptoms. Infectious test sent by forensic nurse. Okay for discharge home with mom and grandmother. Nasal Procedures

## 2023-06-21 NOTE — ED PROVIDER NOTE - PROVIDER TOKENS
PROVIDER:[TOKEN:[[id number]]],PROVIDER:[TOKEN:[[id number]]],PROVIDER:[TOKEN:[[id number]]]

## 2023-06-21 NOTE — ED ADULT NURSE NOTE - SKIN TEMPERATURE MOISTURE
Additional Notes: Dee curtis was not handling syringe well at all, biopsy shave removal will be done in two weeks when he comes back for his results. \\nLocation: left mid temple\\nSize: 0.6cm\\nR/O: IDN vs BN vs MM
Render Risk Assessment In Note?: no
Detail Level: Simple
warm

## 2023-06-21 NOTE — ED PROVIDER NOTE - CARE PROVIDER_API CALL
Fer Ca  Otolaryngology  [address] [place] [address], [address]  [place] [address]  Phone: [phone]  Fax: [phone]  Follow Up Time: 1-3 Days    Gerard Bailey  Otolaryngology  [address]  [place] [address]  Phone: [phone]  Fax: [phone]  Follow Up Time: 1-3 Days    Leonel Cortés  Otolaryngology  [address] [place] [address]  Phone: [phone]  Fax: [phone]  Follow Up Time: 1-3 Days    Tima Peters  Otolaryngology  [address], [address]  [place] [address]  Phone: [phone]  Fax: [phone]  Follow Up Time: 1-3 Days    Júnior Horne  Otolaryngology  [address] [place] [address]  Phone: [phone]  Fax: [phone]  Follow Up Time: 1-3 Days

## 2023-06-21 NOTE — ED PROCEDURE NOTE - TYPE OF NASAL PACKING
----- Message from Wanda Dooley sent at 2/21/2019  8:32 AM CST -----  Please call pt at [phone] (home)   She has an appt  03/01 ... And asking if there are other test that Miss Adams would like her to schedule / to go over at her appt / call [phone]    Rhino Rockharris

## 2023-06-21 NOTE — ED ADULT NURSE NOTE - NSFALLUNIVINTERV_ED_ALL_ED
Bed/Stretcher in lowest position, wheels locked, appropriate side rails in place/Call bell, personal items and telephone in reach/Instruct patient to call for assistance before getting out of bed/chair/stretcher/Non-slip footwear applied when patient is off stretcher/[place] to call system/Physically safe environment - no spills, clutter or unnecessary equipment/Purposeful proactive rounding/Room/bathroom lighting operational, light cord in reach

## 2023-06-21 NOTE — ED PROVIDER NOTE - OBJECTIVE STATEMENT
73 year old male with a history of BPH. HLD, HTN, SVT, DM, a-fib presents with left nostril epistaxis.  Patient states he was recently diagnosed with a-fib and started on Xarelto 3 weeks ago.  At 1am this morning, he developed a left nostril nosebleed that has been persistent despite applying pressure.  Denies pain, dizziness, fever, headache.  PMD Dr. Michael, Cardiology Dr. Link

## 2023-06-21 NOTE — ED PROVIDER NOTE - PROVIDER TOKENS
PROVIDER:[TOKEN:[[id number]],FOLLOWUP:[1-3 Days]],PROVIDER:[TOKEN:[[id number]],FOLLOWUP:[1-3 Days]],PROVIDER:[TOKEN:[[id number]],FOLLOWUP:[1-3 Days]],PROVIDER:[TOKEN:[[id number]],FOLLOWUP:[1-3 Days]],PROVIDER:[TOKEN:[[id number]],FOLLOWUP:[1-3 Days]]

## 2023-06-21 NOTE — ED PROVIDER NOTE - ENMT, MLM
Airway paten, Mouth with normal mucosa. Throat has no vesicles, no oropharyngeal exudates and uvula is midline.  Left nostril anterior nosebleed, persistent.  No nasal septal hematoma

## 2023-06-21 NOTE — ED ADULT NURSE NOTE - OBJECTIVE STATEMENT
Pt is alert and oriented. Pt states that he started to get a nose bleed at 1 am. Pt states that he came in and got it packed around 4am. Pt states that the packing fell out about an hour pta and he started bleeding again. Pt is on Xarelto for afib. Bleeding controlled with napkin. Pt denies sob, chest pain, nausea, vomiting, and dizziness. Pt resp are even and unlabored, skin color monica for race. Pt updated on plan of care.

## 2023-06-21 NOTE — ED PROVIDER NOTE - CLINICAL SUMMARY MEDICAL DECISION MAKING FREE TEXT BOX
Patient on Xarelto for A-fib complaining of epistaxis.  Patient relates he had spontaneous epistaxis approximately 1 this morning came to the ER had packing placed however it fell out 1 hour ago and he began to bleed.  Patient placed some tissues in his nose and bleeding has stopped.  Patient denies trauma fevers chills or any other complaints.  PMD Elmo ENT none    Plan replace packing refer for outpatient follow-up with ENT

## 2023-06-21 NOTE — ED ADULT NURSE NOTE - AS SC BRADEN SENSORY
Implemented All Universal Safety Interventions:  [place] to call system. Call bell, personal items and telephone within reach. Instruct patient to call for assistance. Room bathroom lighting operational. Non-slip footwear when patient is off stretcher. Physically safe environment: no spills, clutter or unnecessary equipment. Stretcher in lowest position, wheels locked, appropriate side rails in place.
(4) no impairment

## 2023-06-21 NOTE — ED PROVIDER NOTE - CARE PROVIDER_API CALL
Shaq Michael  Internal Medicine  [facility] [place] [address]  Phone: [phone]  Fax: [phone]  Follow Up Time:     Chaim Flores  Otolaryngology  [address] [facility], [address]  [place] [address]  Phone: [phone]  Fax: [phone]  Follow Up Time:     Bang Link  Cardiovascular Disease  [address] [facility] [address] [facility], [address]  [place] [address]  Phone: [phone]  Fax: [phone]  Follow Up Time:

## 2023-06-21 NOTE — ED PROVIDER NOTE - NSICDXPASTMEDICALHX_GEN_ALL_CORE_FT
PAST MEDICAL HISTORY:  2019 novel coronavirus disease (COVID-19) spring 2020, no pneumonia    COVID-19 vaccine series completed     Diabetes mellitus type 2 diabetes    GI bleed treated with medication, unknown etiology 7 years ago    Hemorrhoids s/p surgery    History of BPH     HTN (hypertension)     Hypercholesteremia     OA (osteoarthritis)     SVT (supraventricular tachycardia) 2009- 4/21       PAST MEDICAL HISTORY:  2019 novel coronavirus disease (COVID-19) spring 2020, no pneumonia    Atrial fibrillation     COVID-19 vaccine series completed     Diabetes mellitus type 2 diabetes    GI bleed treated with medication, unknown etiology 7 years ago    Hemorrhoids s/p surgery    History of BPH     HTN (hypertension)     Hypercholesteremia     OA (osteoarthritis)     SVT (supraventricular tachycardia) 2009- 4/21

## 2023-06-21 NOTE — ED PROVIDER NOTE - PATIENT PORTAL LINK FT
You can access the FollowMyHealth Patient Portal offered by [facility] by registering at the following website: [email or web address]. By joining [employer]’s FollowMyHealth portal, you will also be able to view your health information using other applications (apps) compatible with our system.

## 2023-06-21 NOTE — ED PROVIDER NOTE - OBJECTIVE STATEMENT
Patient on Xarelto for A-fib complaining of epistaxis.  Patient relates he had spontaneous epistaxis approximately 1 this morning came to the ER had packing placed however it fell out 1 hour ago and he began to bleed.  Patient placed some tissues in his nose and bleeding has stopped.  Patient denies trauma fevers chills or any other complaints.  PMD Fernanda ENT none

## 2023-06-21 NOTE — ED PROVIDER NOTE - CHPI ED SYMPTOMS NEG
no blurred vision/no fever/no loss of consciousness/no nausea/no syncope/no vomiting/no change in level of consciousness/no chills

## 2023-06-21 NOTE — ED ADULT NURSE NOTE - OBJECTIVE STATEMENT
Pt Pt c/o nosebleed since 1am, pt states " im on xarelto, for Afib" NAd. VSS. denies other medical complaints

## 2023-06-21 NOTE — ED PROVIDER NOTE - NSFOLLOWUPINSTRUCTIONS_ED_ALL_ED_FT
Nosebleed, Adult  A nosebleed is when blood comes out of the nose. Nosebleeds are common. Usually, they are not a sign of a serious condition.    Nosebleeds can happen if a blood vessel in your nose starts to bleed or if the lining of your nose (mucous membrane) cracks. They are commonly caused by:  Allergies.  Colds.  Picking your nose.  Blowing your nose too hard.  An injury from sticking an object into your nose or getting hit in the nose.  Dry or cold air.  Less common causes of nosebleeds include:  Toxic fumes.  Something abnormal in the nose or in the air-filled spaces in the bones of the face (sinuses).  Growths in the nose, such as polyps.  Blood thinners or conditions that cause blood to clot slowly.  Certain illnesses or procedures that irritate or dry out the nasal passages.  Follow these instructions at home:  When you have a nosebleed:      Sit down and tilt your head slightly forward.  Use a clean towel or tissue to pinch your nostrils under the bony part of your nose. After 5 minutes, let go of your nose and see if bleeding starts again. Do not release pressure before that time. If there is still bleeding, repeat the pinching and holding for 5 minutes or until the bleeding stops.  Do not place tissues or gauze in the nose to stop the bleeding.  Avoid lying down and avoid tilting your head backward. That may make blood collect in the throat and cause gagging or coughing.  Use a nasal spray decongestant to help with a nosebleed as told by your health care provider.  After a nosebleed:    Avoid blowing your nose or sniffing for a number of hours.  Avoid straining, lifting, or bending at the waist for several days. You may go back to other normal activities as you are able.  If you are taking aspirin or blood thinners and you have nosebleeds, talk to your health care provider. These medicines make bleeding more likely.  Ask your health care provider if you should stop taking the medicines or if you should adjust the dose.  Do not stop taking medicines that your health care provider has recommended unless he or she tells you to stop taking them.  If your nosebleed was caused by dry mucous membranes, use over-the-counter saline nasal spray or gel and a humidifier as told by your health care provider. This will keep the mucous membranes moist and allow them to heal. If you need to use one of these products:  Choose one that is water-soluble.  Use only as much as you need and use it only as often as needed.  Do not lie down right after you use it.  If you get nosebleeds often, talk with your health care provider about medical treatments. Options may include:  Nasal cautery. This treatment stops and prevents nosebleeds by using a chemical swab or electrical device to lightly burn tiny blood vessels inside the nose.  Nasal packing. A gauze or other material is placed in the nose to keep constant pressure on the bleeding area.  Contact a health care provider if you:  Have a fever.  Get nosebleeds often or more often than usual.  Bruise very easily.  Have a nosebleed from having something stuck in your nose.  Have bleeding in your mouth.  Vomit or cough up brown material.  Have a nosebleed after you start a new medicine.  Get help right away if:  You have a nosebleed after a fall or a head injury.  Your nosebleed does not go away after 20 minutes.  You feel dizzy or weak.  You have unusual bleeding from other parts of your body.  You have unusual bruising on other parts of your body.  You become sweaty.  You vomit blood.  Summary  A nosebleed is when blood comes out of the nose. Common causes include allergies, an injury to the nose, or cold or dry air.  Initial treatment includes applying pressure for 5 minutes.  Moisturizing the nose with saline nasal spray or gel after a nosebleed may help prevent future bleeding.  Get help right away if your nosebleed does not go away after 20 minutes.  This information is not intended to replace advice given to you by your health care provider. Make sure you discuss any questions you have with your health care provider.

## 2023-06-23 ENCOUNTER — NON-APPOINTMENT (OUTPATIENT)
Age: 73
End: 2023-06-23

## 2023-06-23 ENCOUNTER — APPOINTMENT (OUTPATIENT)
Dept: INTERNAL MEDICINE | Facility: CLINIC | Age: 73
End: 2023-06-23
Payer: MEDICARE

## 2023-06-23 VITALS
WEIGHT: 221 LBS | OXYGEN SATURATION: 96 % | TEMPERATURE: 98 F | HEIGHT: 71 IN | HEART RATE: 95 BPM | BODY MASS INDEX: 30.94 KG/M2

## 2023-06-23 VITALS — SYSTOLIC BLOOD PRESSURE: 110 MMHG | DIASTOLIC BLOOD PRESSURE: 70 MMHG

## 2023-06-23 DIAGNOSIS — Z01.818 ENCOUNTER FOR OTHER PREPROCEDURAL EXAMINATION: ICD-10-CM

## 2023-06-23 PROCEDURE — 99214 OFFICE O/P EST MOD 30 MIN: CPT | Mod: 25

## 2023-06-23 PROCEDURE — 93000 ELECTROCARDIOGRAM COMPLETE: CPT

## 2023-06-23 RX ORDER — MIRABEGRON 50 MG/1
50 TABLET, FILM COATED, EXTENDED RELEASE ORAL
Refills: 0 | Status: ACTIVE | COMMUNITY

## 2023-06-23 NOTE — PHYSICAL EXAM
[No Acute Distress] : no acute distress [Well Nourished] : well nourished [Normal Sclera/Conjunctiva] : normal sclera/conjunctiva [PERRL] : pupils equal round and reactive to light [Normal Outer Ear/Nose] : the outer ears and nose were normal in appearance [No JVD] : no jugular venous distention [No Lymphadenopathy] : no lymphadenopathy [Supple] : supple [No Respiratory Distress] : no respiratory distress  [No Accessory Muscle Use] : no accessory muscle use [Clear to Auscultation] : lungs were clear to auscultation bilaterally [Normal Rate] : normal rate  [Regular Rhythm] : with a regular rhythm [No Carotid Bruits] : no carotid bruits [No Edema] : there was no peripheral edema [No Extremity Clubbing/Cyanosis] : no extremity clubbing/cyanosis [Soft] : abdomen soft [Non Tender] : non-tender [Non-distended] : non-distended [No Masses] : no abdominal mass palpated [Normal Bowel Sounds] : normal bowel sounds [Grossly Normal Strength/Tone] : grossly normal strength/tone [Normal Affect] : the affect was normal

## 2023-06-27 ENCOUNTER — NON-APPOINTMENT (OUTPATIENT)
Age: 73
End: 2023-06-27

## 2023-06-27 ENCOUNTER — APPOINTMENT (OUTPATIENT)
Dept: ELECTROPHYSIOLOGY | Facility: CLINIC | Age: 73
End: 2023-06-27
Payer: MEDICARE

## 2023-06-27 PROBLEM — I48.91 UNSPECIFIED ATRIAL FIBRILLATION: Chronic | Status: ACTIVE | Noted: 2023-06-21

## 2023-06-27 PROCEDURE — G2066: CPT

## 2023-06-27 PROCEDURE — 93298 REM INTERROG DEV EVAL SCRMS: CPT

## 2023-07-13 NOTE — ASSESSMENT
[FreeTextEntry1] : This is a 71 year old man with paroxysmal atrial tachycardia s/p ablation. ILR demonstrates episode of AF in December. \par \par 1) SVT : s/p successful ablation.  \par 2) Hypertension: BP Controlled on Metoprolol 50 mg  and Lisinopril 10 mg BID will continue at this dose. \par 3) pre-sncope: no recurrent symptoms since ablation. \par 4) New onset of AF patient with one episode of AF in December subsequent epiosdodes on ILR/CM are APCs. Have recommended anticoagulation based on [id number] score. \par \par

## 2023-07-13 NOTE — CARDIOLOGY SUMMARY
[de-identified] : 7/7/2020 : mild MR, mild , AI, mod Ao root dilatation, DHARA, Normal LV systolic function, mild diastolic dysfunction, Normal RV size and function.

## 2023-07-13 NOTE — HISTORY OF PRESENT ILLNESS
[FreeTextEntry1] : This is a 73 year old man with recurrent episodes of SVT. He is s/p ablation of a alejandro terminalis atrial tachycardia on April 29, 2021. He had episodes of AF in december.  TEOFILOBHARTI STAS  denies chest pain, chest pressure, shortness of breath, lightheadedness, dizziness, palpitations, syncope, presyncope, orthopnea, PND, or edema.   He had a fall/syncope earlier this year,.

## 2023-08-01 ENCOUNTER — APPOINTMENT (OUTPATIENT)
Dept: ELECTROPHYSIOLOGY | Facility: CLINIC | Age: 73
End: 2023-08-01
Payer: MEDICARE

## 2023-08-01 ENCOUNTER — NON-APPOINTMENT (OUTPATIENT)
Age: 73
End: 2023-08-01

## 2023-08-01 PROCEDURE — 93298 REM INTERROG DEV EVAL SCRMS: CPT

## 2023-08-01 PROCEDURE — G2066: CPT

## 2023-08-31 NOTE — H&P PST ADULT - LAST PROSTATE EXAM
"Patient's spouse stopped by PT wound care office and reported that patient was seen last week at [facility], seen by Jasmina Campa DPM.  Spouse reports patient's right toe wound was debrided, and that provider also gave patient an injection \"to help straighten his toe\", states XRAYs were taken and did not show any infection in the bone, per spouse's report.  Spouse states they are going to be seen twice/week at DPM office, is asking if they can still attend PT wound care.  PT recommended patient discuss coordination of care with Sathya Babb MD at [facility] as patient is under his care for toe infection, but PT will also contact podiatrist to coordinate care.  PT called podiatry office and left a voicemail requesting a call back.  Karolyn Lomeli, PT 8/31/2023 13:33 EDT     " 2015

## 2023-09-05 ENCOUNTER — APPOINTMENT (OUTPATIENT)
Dept: ELECTROPHYSIOLOGY | Facility: CLINIC | Age: 73
End: 2023-09-05
Payer: MEDICARE

## 2023-09-05 ENCOUNTER — NON-APPOINTMENT (OUTPATIENT)
Age: 73
End: 2023-09-05

## 2023-09-05 PROCEDURE — 93298 REM INTERROG DEV EVAL SCRMS: CPT

## 2023-09-05 PROCEDURE — G2066: CPT

## 2023-09-25 ENCOUNTER — APPOINTMENT (OUTPATIENT)
Dept: CARDIOLOGY | Facility: CLINIC | Age: 73
End: 2023-09-25
Payer: MEDICARE

## 2023-09-25 VITALS
DIASTOLIC BLOOD PRESSURE: 84 MMHG | WEIGHT: 221 LBS | OXYGEN SATURATION: 95 % | HEART RATE: 80 BPM | BODY MASS INDEX: 30.82 KG/M2 | SYSTOLIC BLOOD PRESSURE: 132 MMHG

## 2023-09-25 DIAGNOSIS — I77.810 THORACIC AORTIC ECTASIA: ICD-10-CM

## 2023-09-25 PROCEDURE — 99214 OFFICE O/P EST MOD 30 MIN: CPT | Mod: 25

## 2023-09-25 PROCEDURE — 93000 ELECTROCARDIOGRAM COMPLETE: CPT

## 2023-09-25 NOTE — ED PROVIDER NOTE - IV ALTEPLASE EXCL ABS HIDDEN
Thank you for using [facility] for your urgent care.    You have been diagnosed with a respiratory infection.    You have been prescribed the inhaler Albuterol and the cough medication Tessalon. Also provided prescription for the antibiotic Amoxicillin to use if not improving over the next few days.    May take Tylenol for pain or fever as directed on the bottle for the next 3-5 days.  May use over the counter Zyrtec, Afrin, Flonase as directed on the packaging    Drink plenty of fluids like water, Pedialyte, or low sugar sports drinks. Avoid fluids with caffeine and/or alcohol. Goal is to have moist mouth and clear to light yellow urine.    Supportive treatments include:   Humidifiers/Vaporizers: Adds moisture to the air, eases coughing, and thins out mucus.  Adding essential oils to a diffuser is often helpful, try Eucalyptus and Tea Tree Oil.  Vicks VapoRub: Contains camphor, menthol, and eucalyptus for cough/congestion.  Warm Steam Showers/Baths: Relaxes airways, loosens mucus  Honey: Relieves cough and throat irritation. Use 1/2 to 2 teaspoons (tsp) per dose. Warning:  Do not give honey to children under one year old or if they have an allergy to honey.  Use Gravity: Elevating the head/shoulders decreases congestion from swollen nostrils.  Get a home pulse oximeter (found at pharmacies or through [employer]) to track your oxygen levels. If your oxygen is running less than 95% repeatedly, notify your doctor.    Please follow up with your primary doctor within the next 2-3 days ,or return to urgent care for recheck if not improving.     We can never be 100% sure something more dangerous will not develop over the next few hours to days. Go to the Emergency Room immediately if you develop: worsening or new symptoms, increased or new pain, new fever, significant vomiting or diarrhea, bloody stool or vomit, weakness or numbness, slurring of speech or vision change, abdominal pain, chest pain, difficulty  breathing, or any other concern.       Your blood pressure was elevated above normal today.  This can be damaging to your heart, brain, and kidney's. Please follow up with your primary care Doctor.  You should check your blood pressure at home or at a pharmacy 3 times/week, around the same time of day, for 2-3 weeks; write down the results each time.  [facility] offers free blood pressure checks.  Most major pharmacies ([email or web address], [employer]) have free blood pressure machine stations  If your numbers continue to run above normal, make an appointment to see a primary physician about blood pressure.      [email or web address]  See current wait times for [facility] in real-time  Reserve your waiting-room spot in line     [email or web address]  [place] for the patient portal  See test results and make virtual visits with your primary care provider        ----------------  Thank you for choosing [facility] today.  We hope you had a pleasant experience and we look forward to serving your future needs.    My goal is to provide excellent care and I hope I have meet your highest expectations. If you receive a survey in the mail about today's services, we hope that you will take a few minutes to let us know about your experience.    If you have any questions about your VISIT, please call [phone]    If you have any questions about your BILL, please call [phone]    If you need a copy of your MEDICAL RECORD, please call [phone] or email [email or web address] or use the [employer] kesha->My Record->Document Center->Download Medical Records->Send Medical Record Request.    ------------------------------------------------------------------------------------------------------------  UNLESS OTHERWISE INSTRUCTED BY YOUR URGENT CARE PROVIDER TODAY, all follow-up for your medical issues should be managed by your primary care provider. The  Urgent Care does not manage chronic medical issues or refill medications. You are responsible for scheduling and keeping any necessary follow-up visits with your primary care provider after this visit today.      show

## 2023-09-29 NOTE — ASU DISCHARGE PLAN (ADULT/PEDIATRIC) - MODE OF TRANSPORTATION
Lvm to call and to let us know what to schedule call patient from number on perfectserve [phone]  
Wheelchair/Stroller

## 2023-10-10 ENCOUNTER — APPOINTMENT (OUTPATIENT)
Dept: ELECTROPHYSIOLOGY | Facility: CLINIC | Age: 73
End: 2023-10-10
Payer: MEDICARE

## 2023-10-10 ENCOUNTER — NON-APPOINTMENT (OUTPATIENT)
Age: 73
End: 2023-10-10

## 2023-10-11 PROCEDURE — 93298 REM INTERROG DEV EVAL SCRMS: CPT

## 2023-10-11 PROCEDURE — G2066: CPT

## 2023-10-12 ENCOUNTER — APPOINTMENT (OUTPATIENT)
Dept: CARDIOLOGY | Facility: CLINIC | Age: 73
End: 2023-10-12
Payer: MEDICARE

## 2023-10-12 PROCEDURE — 93015 CV STRESS TEST SUPVJ I&R: CPT

## 2023-10-12 PROCEDURE — A9500: CPT

## 2023-10-12 PROCEDURE — 78452 HT MUSCLE IMAGE SPECT MULT: CPT

## 2023-10-19 ENCOUNTER — RX RENEWAL (OUTPATIENT)
Age: 73
End: 2023-10-19

## 2023-10-30 ENCOUNTER — APPOINTMENT (OUTPATIENT)
Dept: CARDIOLOGY | Facility: CLINIC | Age: 73
End: 2023-10-30
Payer: MEDICARE

## 2023-10-30 VITALS
BODY MASS INDEX: 29.96 KG/M2 | OXYGEN SATURATION: 96 % | DIASTOLIC BLOOD PRESSURE: 72 MMHG | SYSTOLIC BLOOD PRESSURE: 142 MMHG | HEART RATE: 68 BPM | HEIGHT: 71 IN | WEIGHT: 214 LBS

## 2023-10-30 DIAGNOSIS — E78.5 HYPERLIPIDEMIA, UNSPECIFIED: ICD-10-CM

## 2023-10-30 PROCEDURE — 93000 ELECTROCARDIOGRAM COMPLETE: CPT

## 2023-10-30 PROCEDURE — 99214 OFFICE O/P EST MOD 30 MIN: CPT | Mod: 25

## 2023-10-30 RX ORDER — LOPERAMIDE HYDROCHLORIDE 2 MG/1
TABLET ORAL
Refills: 0 | Status: ACTIVE | COMMUNITY

## 2023-11-09 ENCOUNTER — APPOINTMENT (OUTPATIENT)
Dept: ELECTROPHYSIOLOGY | Facility: CLINIC | Age: 73
End: 2023-11-09
Payer: MEDICARE

## 2023-11-09 DIAGNOSIS — Z98.890 OTHER SPECIFIED POSTPROCEDURAL STATES: ICD-10-CM

## 2023-11-09 PROCEDURE — 93285 PRGRMG DEV EVAL SCRMS IP: CPT

## 2023-11-09 PROCEDURE — 99214 OFFICE O/P EST MOD 30 MIN: CPT | Mod: 25

## 2023-12-07 RX ORDER — RIVAROXABAN 15 MG-20MG
1 KIT ORAL
Refills: 0 | DISCHARGE

## 2023-12-07 RX ORDER — RIVAROXABAN 15 MG-20MG
0 KIT ORAL
Refills: 0 | DISCHARGE

## 2023-12-07 NOTE — ASU PATIENT PROFILE, ADULT - FALL HARM RISK - UNIVERSAL INTERVENTIONS
acuteness of illness/anxiety Bed in lowest position, wheels locked, appropriate side rails in place/Call bell, personal items and telephone in reach/Instruct patient to call for assistance before getting out of bed or chair/Non-slip footwear when patient is out of bed/[place] to call system/Physically safe environment - no spills, clutter or unnecessary equipment/Purposeful Proactive Rounding/Room/bathroom lighting operational, light cord in reach Bed in lowest position, wheels locked, appropriate side rails in place/Call bell, personal items and telephone in reach/Instruct patient to call for assistance before getting out of bed or chair/Non-slip footwear when patient is out of bed/[place] to call system/Physically safe environment - no spills, clutter or unnecessary equipment/Purposeful Proactive Rounding/Room/bathroom lighting operational, light cord in reach

## 2023-12-07 NOTE — ASU PATIENT PROFILE, ADULT - NSICDXPASTMEDICALHX_GEN_ALL_CORE_FT
PAST MEDICAL HISTORY:  2019 novel coronavirus disease (COVID-19) spring 2020, no pneumonia    Atrial fibrillation     COVID-19 vaccine series completed     Diabetes mellitus type 2 diabetes    GI bleed treated with medication, unknown etiology 7 years ago    Hemorrhoids s/p surgery    History of BPH     HTN (hypertension)     Hypercholesteremia     OA (osteoarthritis)     SVT (supraventricular tachycardia) 2009- 4/21

## 2023-12-07 NOTE — ASU PATIENT PROFILE, ADULT - NSICDXPASTSURGICALHX_GEN_ALL_CORE_FT
PAST SURGICAL HISTORY:  H/O prior ablation treatment 4/21 for SVT    H/O thyroidectomy partial- remote history    History of knee replacement, total 2000 Right, 2003 LEFT    History of tonsillectomy 23 y/o    S/P hernia repair 6/2021    S/P wrist surgery ORIF - 02/2017    Status post placement of implantable loop recorder      PAST SURGICAL HISTORY:  H/O prior ablation treatment 4/21 for SVT    H/O thyroidectomy partial- remote history    History of knee replacement, total 2000 Right, 2003 LEFT    History of tonsillectomy 25 y/o    S/P hernia repair 6/2021    S/P wrist surgery ORIF - 02/2017    Status post placement of implantable loop recorder

## 2023-12-08 ENCOUNTER — OUTPATIENT (OUTPATIENT)
Dept: OUTPATIENT SERVICES | Facility: HOSPITAL | Age: 73
LOS: 1 days | Discharge: HOME | End: 2023-12-08
Payer: MEDICARE

## 2023-12-08 VITALS
HEART RATE: 80 BPM | DIASTOLIC BLOOD PRESSURE: 91 MMHG | RESPIRATION RATE: 18 BRPM | OXYGEN SATURATION: 95 % | SYSTOLIC BLOOD PRESSURE: 156 MMHG | TEMPERATURE: 97 F

## 2023-12-08 VITALS
DIASTOLIC BLOOD PRESSURE: 82 MMHG | OXYGEN SATURATION: 96 % | HEART RATE: 71 BPM | RESPIRATION RATE: 18 BRPM | SYSTOLIC BLOOD PRESSURE: 127 MMHG

## 2023-12-08 DIAGNOSIS — Z98.890 OTHER SPECIFIED POSTPROCEDURAL STATES: Chronic | ICD-10-CM

## 2023-12-08 DIAGNOSIS — Z98.89 OTHER SPECIFIED POSTPROCEDURAL STATES: Chronic | ICD-10-CM

## 2023-12-08 DIAGNOSIS — Z95.818 PRESENCE OF OTHER CARDIAC IMPLANTS AND GRAFTS: Chronic | ICD-10-CM

## 2023-12-08 DIAGNOSIS — I48.91 UNSPECIFIED ATRIAL FIBRILLATION: ICD-10-CM

## 2023-12-08 DIAGNOSIS — Z98.890 OTHER SPECIFIED POSTPROCEDURAL STATES: ICD-10-CM

## 2023-12-08 PROCEDURE — C1764: CPT

## 2023-12-08 PROCEDURE — 33286 RMVL SUBQ CAR RHYTHM MNTR: CPT

## 2023-12-08 PROCEDURE — 33286 RMVL SUBQ CAR RHYTHM MNTR: CPT | Mod: 59

## 2023-12-08 PROCEDURE — 33285 INSJ SUBQ CAR RHYTHM MNTR: CPT

## 2023-12-08 RX ORDER — DULOXETINE HYDROCHLORIDE 30 MG/1
1 CAPSULE, DELAYED RELEASE ORAL
Refills: 0 | DISCHARGE

## 2023-12-08 RX ORDER — GLIPIZIDE/METFORMIN HCL 2.5-500 MG
1 TABLET ORAL
Refills: 0 | DISCHARGE

## 2023-12-08 RX ORDER — FINASTERIDE 5 MG/1
1 TABLET, FILM COATED ORAL
Refills: 0 | DISCHARGE

## 2023-12-08 RX ORDER — MIRABEGRON 50 MG/1
1 TABLET, EXTENDED RELEASE ORAL
Refills: 0 | DISCHARGE

## 2023-12-08 RX ORDER — TELMISARTAN AND HYDROCHLOROTHIAZIDE 40; 12.5 MG/1; MG/1
1 TABLET ORAL
Refills: 0 | DISCHARGE

## 2023-12-08 RX ORDER — ALFUZOSIN HYDROCHLORIDE 10 MG/1
1 TABLET, EXTENDED RELEASE ORAL
Refills: 0 | DISCHARGE

## 2023-12-08 NOTE — PROCEDURAL SAFETY CHECKLIST WITH OR WITHOUT SEDATION - NSPOSTCOMMENTFT_GEN_ALL_CORE
Patient A/O x3, able to ENNIS with purpose. Patient left chest with dermabond in place. No redness swelling or pain noted at site.

## 2023-12-08 NOTE — ASU PREOP CHECKLIST - IDENTIFICATION BAND VERIFIED
Called patient to schedule a screening mammogram PATIENTPHONEMESSAGE: left message.-     Additional information     done

## 2023-12-08 NOTE — ASU PREOP CHECKLIST - SKIN PREP
Continued Stay Note  [facility]     Patient Name: Chaitanya Montesinos  MRN: [id number]  Today's Date: 9/6/2022    Admit Date: 8/25/2022     Discharge Plan     Row Name 09/06/22 1411       Plan    Plan Home w/ home health (referrals pending)    Plan Comments [facility] spoke with Cely, daughter at [facility] office to update d/c plan.  Pt ambulated 240feet with Physical therapy.  Cely mentioned that Pt spouse wanted him to go to a skilled rehab at discharge.  [facility] explained to talyather that Pt has [employer] insurance and he would not get precert for a sub acute rehab walking as far as he is.  Cely asked that home health referrals be sent for agencies that service both [place] and [place]  [facility]  [facility]/W [facility]  and she advised that their [place] office no longer accepts [employer].  [facility] sent referrals to [facility]mar[facility] .  CCP following........SS/RN CM               Discharge Codes    No documentation.               Expected Discharge Date and Time     Expected Discharge Date Expected Discharge Time    Sep 8, 2022             Missy Botello RN     n/a

## 2023-12-08 NOTE — PACU DISCHARGE NOTE - COMMENTS
Patient remains A/Ox3. Denies complaint of pain or discomfort. Patient with left chest with dermabond in place. No redness, swelling or pain noted at site. Patient instructed on home monitoring by Fabián Gao. Reviewed discharge instructions with patient, Patient offers no further questions or concerns.
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
## 2023-12-13 DIAGNOSIS — T82.111A BREAKDOWN (MECHANICAL) OF CARDIAC PULSE GENERATOR (BATTERY), INITIAL ENCOUNTER: ICD-10-CM

## 2023-12-21 ENCOUNTER — APPOINTMENT (OUTPATIENT)
Dept: ELECTROPHYSIOLOGY | Facility: CLINIC | Age: 73
End: 2023-12-21
Payer: MEDICARE

## 2023-12-21 VITALS
BODY MASS INDEX: 29.54 KG/M2 | SYSTOLIC BLOOD PRESSURE: 120 MMHG | OXYGEN SATURATION: 97 % | HEART RATE: 80 BPM | DIASTOLIC BLOOD PRESSURE: 70 MMHG | HEIGHT: 71 IN | WEIGHT: 211 LBS

## 2023-12-21 DIAGNOSIS — I48.91 UNSPECIFIED ATRIAL FIBRILLATION: ICD-10-CM

## 2023-12-21 DIAGNOSIS — Z95.818 PRESENCE OF OTHER CARDIAC IMPLANTS AND GRAFTS: ICD-10-CM

## 2023-12-21 PROBLEM — Z98.890 HISTORY OF LOOP RECORDER: Status: ACTIVE | Noted: 2021-06-15

## 2023-12-21 PROCEDURE — 93285 PRGRMG DEV EVAL SCRMS IP: CPT

## 2023-12-21 NOTE — ASSESSMENT
[FreeTextEntry1] : This is a 73-year-old man with paroxysmal atrial tachycardia s/p ablation. ILR demonstrates episode of AF in December.  1) SVT : s/p successful ablation.  2) Hypertension: BP Controlled (120/70) on Metoprolol 50 mg and Lisinopril 10 mg BID will continue at this dose.  3) pre-sncope: no recurrent symptoms since ablation.  4) New onset of AF patient with 8 episodes of AF. Have recommended anticoagulation based on [id number] score. 5) ILR is at EOS  Notes from Dr. Link, Nuc stress, and ECGs.

## 2023-12-21 NOTE — CARDIOLOGY SUMMARY
[de-identified] : 7/7/2020 : mild MR, mild , AI, mod Ao root dilatation, DHARA, Normal LV systolic function, mild diastolic dysfunction, Normal RV size and function.

## 2023-12-27 ENCOUNTER — RX RENEWAL (OUTPATIENT)
Age: 73
End: 2023-12-27

## 2023-12-28 RX ORDER — TELMISARTAN AND HYDROCHLOROTHIAZIDE 40; 12.5 MG/1; MG/1
40-12.5 TABLET ORAL
Qty: 90 | Refills: 1 | Status: ACTIVE | COMMUNITY
Start: 2022-12-08 | End: 1900-01-01

## 2024-01-19 ENCOUNTER — RX RENEWAL (OUTPATIENT)
Age: 74
End: 2024-01-19

## 2024-01-22 ENCOUNTER — APPOINTMENT (OUTPATIENT)
Dept: ELECTROPHYSIOLOGY | Facility: CLINIC | Age: 74
End: 2024-01-22
Payer: MEDICARE

## 2024-01-23 ENCOUNTER — NON-APPOINTMENT (OUTPATIENT)
Age: 74
End: 2024-01-23

## 2024-01-23 PROCEDURE — 93298 REM INTERROG DEV EVAL SCRMS: CPT

## 2024-02-16 ENCOUNTER — RX RENEWAL (OUTPATIENT)
Age: 74
End: 2024-02-16

## 2024-02-27 ENCOUNTER — NON-APPOINTMENT (OUTPATIENT)
Age: 74
End: 2024-02-27

## 2024-02-27 ENCOUNTER — APPOINTMENT (OUTPATIENT)
Dept: ELECTROPHYSIOLOGY | Facility: CLINIC | Age: 74
End: 2024-02-27
Payer: MEDICARE

## 2024-02-27 PROCEDURE — 93298 REM INTERROG DEV EVAL SCRMS: CPT

## 2024-03-14 RX ORDER — METOPROLOL SUCCINATE 50 MG/1
50 TABLET, EXTENDED RELEASE ORAL
Qty: 180 | Refills: 2 | Status: ACTIVE | COMMUNITY
Start: 2022-04-13 | End: 1900-01-01

## 2024-03-31 ENCOUNTER — APPOINTMENT (OUTPATIENT)
Dept: ELECTROPHYSIOLOGY | Facility: CLINIC | Age: 74
End: 2024-03-31
Payer: MEDICARE

## 2024-04-01 ENCOUNTER — NON-APPOINTMENT (OUTPATIENT)
Age: 74
End: 2024-04-01

## 2024-04-01 PROCEDURE — 93298 REM INTERROG DEV EVAL SCRMS: CPT

## 2024-05-03 ENCOUNTER — NON-APPOINTMENT (OUTPATIENT)
Age: 74
End: 2024-05-03

## 2024-05-03 ENCOUNTER — APPOINTMENT (OUTPATIENT)
Dept: ELECTROPHYSIOLOGY | Facility: CLINIC | Age: 74
End: 2024-05-03
Payer: MEDICARE

## 2024-05-03 PROCEDURE — 93298 REM INTERROG DEV EVAL SCRMS: CPT

## 2024-05-21 RX ORDER — DULOXETINE HYDROCHLORIDE 20 MG/1
20 CAPSULE, DELAYED RELEASE PELLETS ORAL
Qty: 90 | Refills: 0 | Status: ACTIVE | COMMUNITY
Start: 2019-09-09 | End: 1900-01-01

## 2024-05-28 ENCOUNTER — APPOINTMENT (OUTPATIENT)
Dept: INTERNAL MEDICINE | Facility: CLINIC | Age: 74
End: 2024-05-28
Payer: MEDICARE

## 2024-05-28 VITALS — SYSTOLIC BLOOD PRESSURE: 122 MMHG | DIASTOLIC BLOOD PRESSURE: 78 MMHG

## 2024-05-28 VITALS
RESPIRATION RATE: 16 BRPM | HEART RATE: 85 BPM | HEIGHT: 71 IN | WEIGHT: 210 LBS | OXYGEN SATURATION: 97 % | BODY MASS INDEX: 29.4 KG/M2

## 2024-05-28 DIAGNOSIS — I10 ESSENTIAL (PRIMARY) HYPERTENSION: ICD-10-CM

## 2024-05-28 DIAGNOSIS — E11.9 TYPE 2 DIABETES MELLITUS W/OUT COMPLICATIONS: ICD-10-CM

## 2024-05-28 DIAGNOSIS — I47.19 OTHER SUPRAVENTRICULAR TACHYCARDIA: ICD-10-CM

## 2024-05-28 DIAGNOSIS — R80.9 PROTEINURIA, UNSPECIFIED: ICD-10-CM

## 2024-05-28 PROCEDURE — 36415 COLL VENOUS BLD VENIPUNCTURE: CPT

## 2024-05-28 PROCEDURE — 99214 OFFICE O/P EST MOD 30 MIN: CPT

## 2024-05-28 PROCEDURE — G2211 COMPLEX E/M VISIT ADD ON: CPT

## 2024-05-28 RX ORDER — MULTIVITAMIN
TABLET ORAL DAILY
Refills: 0 | Status: DISCONTINUED | COMMUNITY
Start: 2020-07-06 | End: 2024-05-28

## 2024-05-28 RX ORDER — MULTIVIT-MIN/FOLIC/VIT K/LYCOP 400-300MCG
1000 TABLET ORAL DAILY
Refills: 0 | Status: DISCONTINUED | COMMUNITY
Start: 2020-07-06 | End: 2024-05-28

## 2024-05-28 RX ORDER — CHOLECALCIFEROL (VITAMIN D3) 125 MCG
125 MCG CAPSULE ORAL
Refills: 0 | Status: DISCONTINUED | COMMUNITY
Start: 2020-07-06 | End: 2024-05-28

## 2024-05-28 NOTE — HISTORY OF PRESENT ILLNESS
[de-identified] : Pt was asked to come in for BFW as he has not had any in almost a year  Pt feels well off xarelto  Has not had hgba1c in a year

## 2024-05-28 NOTE — PHYSICAL EXAM
[No Acute Distress] : no acute distress [Normal Sclera/Conjunctiva] : normal sclera/conjunctiva [Normal Outer Ear/Nose] : the outer ears and nose were normal in appearance [No JVD] : no jugular venous distention [No Respiratory Distress] : no respiratory distress  [No Accessory Muscle Use] : no accessory muscle use [Clear to Auscultation] : lungs were clear to auscultation bilaterally [Normal Rate] : normal rate  [Regular Rhythm] : with a regular rhythm [No Edema] : there was no peripheral edema [No Extremity Clubbing/Cyanosis] : no extremity clubbing/cyanosis [Soft] : abdomen soft [Non Tender] : non-tender [Non-distended] : non-distended [No Masses] : no abdominal mass palpated [No Focal Deficits] : no focal deficits

## 2024-05-28 NOTE — PLAN
[FreeTextEntry1] : labs performed  Adivseed at least every 6 months with BW and urine  Pt see eye doctor and cardio regularly

## 2024-05-28 NOTE — HEALTH RISK ASSESSMENT
[0] : 2) Feeling down, depressed, or hopeless: Not at all (0) [PHQ-2 Negative - No further assessment needed] : PHQ-2 Negative - No further assessment needed [[id number]] : 0

## 2024-06-02 ENCOUNTER — NON-APPOINTMENT (OUTPATIENT)
Age: 74
End: 2024-06-02

## 2024-06-03 LAB
ALBUMIN SERPL ELPH-MCNC: 4.4 G/DL
ALP BLD-CCNC: 97 U/L
ALT SERPL-CCNC: 36 U/L
ANION GAP SERPL CALC-SCNC: 16 MMOL/L
AST SERPL-CCNC: 31 U/L
BASOPHILS # BLD AUTO: 0.11 K/UL
BASOPHILS NFR BLD AUTO: 1.9 %
BILIRUB SERPL-MCNC: 0.6 MG/DL
BUN SERPL-MCNC: 14 MG/DL
CALCIUM SERPL-MCNC: 9.4 MG/DL
CHLORIDE SERPL-SCNC: 95 MMOL/L
CHOLEST SERPL-MCNC: 281 MG/DL
CO2 SERPL-SCNC: 24 MMOL/L
CREAT SERPL-MCNC: 0.82 MG/DL
CREAT SPEC-SCNC: 132 MG/DL
EGFR: 92 ML/MIN/1.73M2
EOSINOPHIL # BLD AUTO: 0.41 K/UL
EOSINOPHIL NFR BLD AUTO: 7.1 %
ESTIMATED AVERAGE GLUCOSE: 203 MG/DL
GLUCOSE SERPL-MCNC: 185 MG/DL
HBA1C MFR BLD HPLC: 8.7 %
HCT VFR BLD CALC: 45.9 %
HDLC SERPL-MCNC: 67 MG/DL
HGB BLD-MCNC: 15.6 G/DL
IMM GRANULOCYTES NFR BLD AUTO: 0.2 %
LDLC SERPL CALC-MCNC: 204 MG/DL
LYMPHOCYTES # BLD AUTO: 2.16 K/UL
LYMPHOCYTES NFR BLD AUTO: 37.2 %
MAN DIFF?: NORMAL
MCHC RBC-ENTMCNC: 32.2 PG
MCHC RBC-ENTMCNC: 34 GM/DL
MCV RBC AUTO: 94.8 FL
MICROALBUMIN 24H UR DL<=1MG/L-MCNC: <1.2 MG/DL
MICROALBUMIN/CREAT 24H UR-RTO: NORMAL MG/G
MONOCYTES # BLD AUTO: 0.68 K/UL
MONOCYTES NFR BLD AUTO: 11.7 %
NEUTROPHILS # BLD AUTO: 2.44 K/UL
NEUTROPHILS NFR BLD AUTO: 41.9 %
NONHDLC SERPL-MCNC: 214 MG/DL
PLATELET # BLD AUTO: 278 K/UL
POTASSIUM SERPL-SCNC: 4.7 MMOL/L
PROT SERPL-MCNC: 7.6 G/DL
RBC # BLD: 4.84 M/UL
RBC # FLD: 12.9 %
SODIUM SERPL-SCNC: 134 MMOL/L
TRIGL SERPL-MCNC: 62 MG/DL
TSH SERPL-ACNC: 1.48 UIU/ML
WBC # FLD AUTO: 5.81 K/UL

## 2024-06-03 RX ORDER — ROSUVASTATIN CALCIUM 10 MG/1
10 TABLET, FILM COATED ORAL
Qty: 90 | Refills: 0 | Status: ACTIVE | COMMUNITY
Start: 2024-06-03 | End: 1900-01-01

## 2024-06-07 ENCOUNTER — APPOINTMENT (OUTPATIENT)
Dept: ELECTROPHYSIOLOGY | Facility: CLINIC | Age: 74
End: 2024-06-07
Payer: MEDICARE

## 2024-06-07 ENCOUNTER — NON-APPOINTMENT (OUTPATIENT)
Age: 74
End: 2024-06-07

## 2024-06-07 PROCEDURE — 93298 REM INTERROG DEV EVAL SCRMS: CPT

## 2024-06-21 ENCOUNTER — NON-APPOINTMENT (OUTPATIENT)
Age: 74
End: 2024-06-21

## 2024-06-21 ENCOUNTER — APPOINTMENT (OUTPATIENT)
Dept: ELECTROPHYSIOLOGY | Facility: CLINIC | Age: 74
End: 2024-06-21
Payer: MEDICARE

## 2024-06-21 VITALS
WEIGHT: 210 LBS | OXYGEN SATURATION: 96 % | HEIGHT: 71 IN | SYSTOLIC BLOOD PRESSURE: 149 MMHG | BODY MASS INDEX: 29.4 KG/M2 | DIASTOLIC BLOOD PRESSURE: 82 MMHG | HEART RATE: 81 BPM

## 2024-06-21 VITALS — DIASTOLIC BLOOD PRESSURE: 77 MMHG | SYSTOLIC BLOOD PRESSURE: 127 MMHG

## 2024-06-21 PROCEDURE — 93285 PRGRMG DEV EVAL SCRMS IP: CPT

## 2024-06-21 NOTE — ASSESSMENT
[FreeTextEntry1] : ILR reveals NSR, false episodes of AFs which are consistent with frequent APCs and PVCs. Advised pt to use symptom activator the next time he experiences lightheadedness. If he develops more PAF will consider Watchman BRUCE occlusion device. Continue Metoprolol.

## 2024-06-21 NOTE — HISTORY OF PRESENT ILLNESS
[FreeTextEntry1] : This is a 74 year old man with recurrent episodes of SVT. He is s/p ablation of a alejandro terminalis atrial tachycardia on April 29, 2021. He had episodes of AF in december.  He had new ILR implant in Dec 2023.  He reports rare lightheadedness.

## 2024-07-26 ENCOUNTER — NON-APPOINTMENT (OUTPATIENT)
Age: 74
End: 2024-07-26

## 2024-07-26 ENCOUNTER — APPOINTMENT (OUTPATIENT)
Dept: ELECTROPHYSIOLOGY | Facility: CLINIC | Age: 74
End: 2024-07-26
Payer: MEDICARE

## 2024-07-26 PROCEDURE — 93298 REM INTERROG DEV EVAL SCRMS: CPT

## 2024-08-30 ENCOUNTER — NON-APPOINTMENT (OUTPATIENT)
Age: 74
End: 2024-08-30

## 2024-08-30 ENCOUNTER — APPOINTMENT (OUTPATIENT)
Dept: ELECTROPHYSIOLOGY | Facility: CLINIC | Age: 74
End: 2024-08-30
Payer: MEDICARE

## 2024-08-30 ENCOUNTER — RX RENEWAL (OUTPATIENT)
Age: 74
End: 2024-08-30

## 2024-08-30 PROCEDURE — 93298 REM INTERROG DEV EVAL SCRMS: CPT

## 2024-09-10 ENCOUNTER — APPOINTMENT (OUTPATIENT)
Dept: INTERNAL MEDICINE | Facility: CLINIC | Age: 74
End: 2024-09-10
Payer: MEDICARE

## 2024-09-10 VITALS
HEART RATE: 80 BPM | BODY MASS INDEX: 29.54 KG/M2 | OXYGEN SATURATION: 95 % | TEMPERATURE: 98.7 F | WEIGHT: 211 LBS | HEIGHT: 71 IN | RESPIRATION RATE: 16 BRPM

## 2024-09-10 VITALS — DIASTOLIC BLOOD PRESSURE: 78 MMHG | SYSTOLIC BLOOD PRESSURE: 128 MMHG

## 2024-09-10 DIAGNOSIS — I10 ESSENTIAL (PRIMARY) HYPERTENSION: ICD-10-CM

## 2024-09-10 DIAGNOSIS — E11.9 TYPE 2 DIABETES MELLITUS W/OUT COMPLICATIONS: ICD-10-CM

## 2024-09-10 DIAGNOSIS — R00.2 PALPITATIONS: ICD-10-CM

## 2024-09-10 DIAGNOSIS — E78.5 HYPERLIPIDEMIA, UNSPECIFIED: ICD-10-CM

## 2024-09-10 PROCEDURE — G2211 COMPLEX E/M VISIT ADD ON: CPT

## 2024-09-10 PROCEDURE — 36415 COLL VENOUS BLD VENIPUNCTURE: CPT

## 2024-09-10 PROCEDURE — 99214 OFFICE O/P EST MOD 30 MIN: CPT

## 2024-09-10 NOTE — HISTORY OF PRESENT ILLNESS
[de-identified] : Pt comes for FBW and med renewal  Not sure if he needs any med renewal  Has not been following wny diet. Pt sees Dr Link cardio  He did start the rosuvastatin that i order for high ldl  He is aware that hgba1c went from 6.7 to 8.7    Pt refusing vaccines currently

## 2024-09-10 NOTE — PLAN
[FreeTextEntry1] : labs obtained  advised diet  advised flu vaccine but pt refusing.  has not had colonoscopy in more than 5 yrs.  Pt also had 2 episodes of dizzziness when standing over past 3 months  will get neuro and carotid sono

## 2024-09-10 NOTE — PHYSICAL EXAM
[No Acute Distress] : no acute distress [Normal Sclera/Conjunctiva] : normal sclera/conjunctiva [Normal Outer Ear/Nose] : the outer ears and nose were normal in appearance [No JVD] : no jugular venous distention [No Respiratory Distress] : no respiratory distress  [No Accessory Muscle Use] : no accessory muscle use [Clear to Auscultation] : lungs were clear to auscultation bilaterally [Normal Rate] : normal rate  [Regular Rhythm] : with a regular rhythm [No Carotid Bruits] : no carotid bruits [No Edema] : there was no peripheral edema [No Extremity Clubbing/Cyanosis] : no extremity clubbing/cyanosis [Soft] : abdomen soft [Non Tender] : non-tender [Non-distended] : non-distended [No Masses] : no abdominal mass palpated

## 2024-09-10 NOTE — REVIEW OF SYSTEMS
[Fever] : no fever [Chills] : no chills [Chest Pain] : no chest pain [Palpitations] : no palpitations [Lower Ext Edema] : no lower extremity edema [Shortness Of Breath] : no shortness of breath [Wheezing] : no wheezing [Abdominal Pain] : no abdominal pain [Cough] : no cough

## 2024-09-13 LAB
ALBUMIN SERPL ELPH-MCNC: 4.5 G/DL
ALP BLD-CCNC: 94 U/L
ALT SERPL-CCNC: 44 U/L
ANION GAP SERPL CALC-SCNC: 14 MMOL/L
AST SERPL-CCNC: 35 U/L
BASOPHILS # BLD AUTO: 0.1 K/UL
BASOPHILS NFR BLD AUTO: 1.7 %
BILIRUB SERPL-MCNC: 0.5 MG/DL
BUN SERPL-MCNC: 13 MG/DL
CALCIUM SERPL-MCNC: 9.6 MG/DL
CHLORIDE SERPL-SCNC: 99 MMOL/L
CHOLEST SERPL-MCNC: 197 MG/DL
CO2 SERPL-SCNC: 22 MMOL/L
CREAT SERPL-MCNC: 0.7 MG/DL
EGFR: 96 ML/MIN/1.73M2
EOSINOPHIL # BLD AUTO: 0.81 K/UL
EOSINOPHIL NFR BLD AUTO: 13.9 %
ESTIMATED AVERAGE GLUCOSE: 226 MG/DL
GLUCOSE SERPL-MCNC: 234 MG/DL
HBA1C MFR BLD HPLC: 9.5 %
HCT VFR BLD CALC: 42.8 %
HDLC SERPL-MCNC: 69 MG/DL
HGB BLD-MCNC: 14.3 G/DL
IMM GRANULOCYTES NFR BLD AUTO: 0.5 %
LDLC SERPL CALC-MCNC: 117 MG/DL
LYMPHOCYTES # BLD AUTO: 2.14 K/UL
LYMPHOCYTES NFR BLD AUTO: 36.8 %
MAN DIFF?: NORMAL
MCHC RBC-ENTMCNC: 32.1 PG
MCHC RBC-ENTMCNC: 33.4 GM/DL
MCV RBC AUTO: 96.2 FL
MONOCYTES # BLD AUTO: 0.53 K/UL
MONOCYTES NFR BLD AUTO: 9.1 %
NEUTROPHILS # BLD AUTO: 2.2 K/UL
NEUTROPHILS NFR BLD AUTO: 38 %
NONHDLC SERPL-MCNC: 128 MG/DL
PLATELET # BLD AUTO: 265 K/UL
POTASSIUM SERPL-SCNC: 4.6 MMOL/L
PROT SERPL-MCNC: 7.5 G/DL
PSA SERPL-MCNC: 1.17 NG/ML
RBC # BLD: 4.45 M/UL
RBC # FLD: 13.2 %
SODIUM SERPL-SCNC: 134 MMOL/L
TRIGL SERPL-MCNC: 60 MG/DL
TSH SERPL-ACNC: 1.65 UIU/ML
WBC # FLD AUTO: 5.81 K/UL

## 2024-09-16 RX ORDER — DAPAGLIFLOZIN 10 MG/1
10 TABLET, FILM COATED ORAL
Qty: 90 | Refills: 0 | Status: ACTIVE | COMMUNITY
Start: 2024-09-13 | End: 1900-01-01

## 2024-09-17 ENCOUNTER — APPOINTMENT (OUTPATIENT)
Dept: ULTRASOUND IMAGING | Facility: CLINIC | Age: 74
End: 2024-09-17
Payer: MEDICARE

## 2024-09-17 PROCEDURE — 93880 EXTRACRANIAL BILAT STUDY: CPT

## 2024-10-04 ENCOUNTER — APPOINTMENT (OUTPATIENT)
Dept: ELECTROPHYSIOLOGY | Facility: CLINIC | Age: 74
End: 2024-10-04
Payer: MEDICARE

## 2024-10-04 ENCOUNTER — NON-APPOINTMENT (OUTPATIENT)
Age: 74
End: 2024-10-04

## 2024-10-04 PROCEDURE — 93298 REM INTERROG DEV EVAL SCRMS: CPT

## 2024-10-08 NOTE — ED PROVIDER NOTE - CPE EDP CARDIAC NORM
Duration Of Freeze Thaw-Cycle (Seconds): 0 Show Applicator Variable?: Yes Detail Level: Detailed Render Post-Care Instructions In Note?: no Post-Care Instructions: I reviewed with the patient in detail post-care instructions. Patient is to wear sunprotection, and avoid picking at any of the treated lesions. Pt may apply Vaseline to crusted or scabbing areas. Consent: The patient's consent was obtained including but not limited to risks of crusting, scabbing, blistering, scarring, darker or lighter pigmentary change, recurrence, incomplete removal and infection. normal...

## 2024-10-25 ENCOUNTER — APPOINTMENT (OUTPATIENT)
Dept: GASTROENTEROLOGY | Facility: CLINIC | Age: 74
End: 2024-10-25
Payer: MEDICARE

## 2024-10-25 VITALS
SYSTOLIC BLOOD PRESSURE: 103 MMHG | BODY MASS INDEX: 29.54 KG/M2 | OXYGEN SATURATION: 95 % | HEART RATE: 72 BPM | HEIGHT: 71 IN | DIASTOLIC BLOOD PRESSURE: 68 MMHG | WEIGHT: 211 LBS

## 2024-10-25 DIAGNOSIS — Z12.11 ENCOUNTER FOR SCREENING FOR MALIGNANT NEOPLASM OF COLON: ICD-10-CM

## 2024-10-25 DIAGNOSIS — R10.13 EPIGASTRIC PAIN: ICD-10-CM

## 2024-10-25 DIAGNOSIS — R19.4 CHANGE IN BOWEL HABIT: ICD-10-CM

## 2024-10-25 PROCEDURE — 99204 OFFICE O/P NEW MOD 45 MIN: CPT

## 2024-10-25 RX ORDER — SODIUM SULFATE, POTASSIUM SULFATE AND MAGNESIUM SULFATE 1.6; 3.13; 17.5 G/177ML; G/177ML; G/177ML
17.5-3.13-1.6 SOLUTION ORAL
Qty: 177 | Refills: 0 | Status: ACTIVE | COMMUNITY
Start: 2024-10-25 | End: 1900-01-01

## 2024-11-08 ENCOUNTER — APPOINTMENT (OUTPATIENT)
Dept: ELECTROPHYSIOLOGY | Facility: CLINIC | Age: 74
End: 2024-11-08
Payer: MEDICARE

## 2024-11-08 ENCOUNTER — NON-APPOINTMENT (OUTPATIENT)
Age: 74
End: 2024-11-08

## 2024-11-08 PROCEDURE — 93298 REM INTERROG DEV EVAL SCRMS: CPT

## 2024-11-12 NOTE — ASU PATIENT PROFILE, ADULT - FALL HARM RISK - UNIVERSAL INTERVENTIONS
Quality 431: Preventive Care And Screening: Unhealthy Alcohol Use - Screening: Patient not identified as an unhealthy alcohol user when screened for unhealthy alcohol use using a systematic screening method
Quality 130: Documentation Of Current Medications In The Medical Record: Current Medications Documented
Quality 47: Advance Care Plan: Advance Care Planning discussed and documented; advance care plan or surrogate decision maker documented in the medical record.
Detail Level: Detailed
Quality 226: Preventive Care And Screening: Tobacco Use: Screening And Cessation Intervention: Patient screened for tobacco use and is an ex/non-smoker
Bed in lowest position, wheels locked, appropriate side rails in place/Call bell, personal items and telephone in reach/Instruct patient to call for assistance before getting out of bed or chair/Non-slip footwear when patient is out of bed/[place] to call system/Physically safe environment - no spills, clutter or unnecessary equipment/Purposeful Proactive Rounding/Room/bathroom lighting operational, light cord in reach

## 2024-11-13 ENCOUNTER — RX RENEWAL (OUTPATIENT)
Age: 74
End: 2024-11-13

## 2024-11-26 ENCOUNTER — APPOINTMENT (OUTPATIENT)
Dept: INTERNAL MEDICINE | Facility: CLINIC | Age: 74
End: 2024-11-26
Payer: MEDICARE

## 2024-11-26 VITALS — SYSTOLIC BLOOD PRESSURE: 110 MMHG | DIASTOLIC BLOOD PRESSURE: 68 MMHG

## 2024-11-26 VITALS — OXYGEN SATURATION: 96 % | WEIGHT: 210 LBS | BODY MASS INDEX: 29.4 KG/M2 | HEIGHT: 71 IN | HEART RATE: 78 BPM

## 2024-11-26 DIAGNOSIS — E11.9 TYPE 2 DIABETES MELLITUS W/OUT COMPLICATIONS: ICD-10-CM

## 2024-11-26 DIAGNOSIS — E78.5 HYPERLIPIDEMIA, UNSPECIFIED: ICD-10-CM

## 2024-11-26 DIAGNOSIS — G57.91 UNSPECIFIED MONONEUROPATHY OF RIGHT LOWER LIMB: ICD-10-CM

## 2024-11-26 PROCEDURE — 36415 COLL VENOUS BLD VENIPUNCTURE: CPT

## 2024-11-26 PROCEDURE — G2211 COMPLEX E/M VISIT ADD ON: CPT

## 2024-11-26 PROCEDURE — 99214 OFFICE O/P EST MOD 30 MIN: CPT

## 2024-11-27 LAB
ALBUMIN SERPL ELPH-MCNC: 4.5 G/DL
ALP BLD-CCNC: 70 U/L
ALT SERPL-CCNC: 29 U/L
ANION GAP SERPL CALC-SCNC: 16 MMOL/L
AST SERPL-CCNC: 27 U/L
BASOPHILS # BLD AUTO: 0.09 K/UL
BASOPHILS NFR BLD AUTO: 1.5 %
BILIRUB SERPL-MCNC: 0.6 MG/DL
BUN SERPL-MCNC: 12 MG/DL
CALCIUM SERPL-MCNC: 9.6 MG/DL
CHLORIDE SERPL-SCNC: 96 MMOL/L
CHOLEST SERPL-MCNC: 170 MG/DL
CO2 SERPL-SCNC: 24 MMOL/L
CREAT SERPL-MCNC: 0.84 MG/DL
EGFR: 91 ML/MIN/1.73M2
EOSINOPHIL # BLD AUTO: 0.34 K/UL
EOSINOPHIL NFR BLD AUTO: 5.5 %
ESTIMATED AVERAGE GLUCOSE: 134 MG/DL
GLUCOSE SERPL-MCNC: 115 MG/DL
HBA1C MFR BLD HPLC: 6.3 %
HCT VFR BLD CALC: 42.5 %
HDLC SERPL-MCNC: 70 MG/DL
HGB BLD-MCNC: 14.3 G/DL
IMM GRANULOCYTES NFR BLD AUTO: 0.3 %
LDLC SERPL CALC-MCNC: 90 MG/DL
LYMPHOCYTES # BLD AUTO: 2.64 K/UL
LYMPHOCYTES NFR BLD AUTO: 42.7 %
MAN DIFF?: NORMAL
MCHC RBC-ENTMCNC: 32.2 PG
MCHC RBC-ENTMCNC: 33.6 G/DL
MCV RBC AUTO: 95.7 FL
MONOCYTES # BLD AUTO: 0.63 K/UL
MONOCYTES NFR BLD AUTO: 10.2 %
NEUTROPHILS # BLD AUTO: 2.46 K/UL
NEUTROPHILS NFR BLD AUTO: 39.8 %
NONHDLC SERPL-MCNC: 100 MG/DL
PLATELET # BLD AUTO: 244 K/UL
POTASSIUM SERPL-SCNC: 5 MMOL/L
PROT SERPL-MCNC: 7.5 G/DL
RBC # BLD: 4.44 M/UL
RBC # FLD: 13.3 %
SODIUM SERPL-SCNC: 136 MMOL/L
TRIGL SERPL-MCNC: 52 MG/DL
TSH SERPL-ACNC: 1.77 UIU/ML
WBC # FLD AUTO: 6.18 K/UL

## 2024-12-03 ENCOUNTER — APPOINTMENT (OUTPATIENT)
Dept: ELECTROPHYSIOLOGY | Facility: CLINIC | Age: 74
End: 2024-12-03

## 2024-12-03 VITALS
HEART RATE: 77 BPM | HEIGHT: 71 IN | BODY MASS INDEX: 28.98 KG/M2 | DIASTOLIC BLOOD PRESSURE: 75 MMHG | WEIGHT: 207 LBS | OXYGEN SATURATION: 96 % | SYSTOLIC BLOOD PRESSURE: 107 MMHG

## 2024-12-03 DIAGNOSIS — I47.10 SUPRAVENTRICULAR TACHYCARDIA, UNSPECIFIED: ICD-10-CM

## 2024-12-03 DIAGNOSIS — I48.91 UNSPECIFIED ATRIAL FIBRILLATION: ICD-10-CM

## 2024-12-03 DIAGNOSIS — I47.19 OTHER SUPRAVENTRICULAR TACHYCARDIA: ICD-10-CM

## 2024-12-03 DIAGNOSIS — I10 ESSENTIAL (PRIMARY) HYPERTENSION: ICD-10-CM

## 2024-12-03 PROCEDURE — G2211 COMPLEX E/M VISIT ADD ON: CPT

## 2024-12-03 PROCEDURE — 93000 ELECTROCARDIOGRAM COMPLETE: CPT | Mod: XU

## 2024-12-03 PROCEDURE — 93285 PRGRMG DEV EVAL SCRMS IP: CPT

## 2024-12-03 PROCEDURE — 99214 OFFICE O/P EST MOD 30 MIN: CPT | Mod: 25

## 2024-12-03 PROCEDURE — 93291 INTERROG DEV EVAL SCRMS IP: CPT

## 2024-12-03 RX ORDER — SILDENAFIL CITRATE 100 MG/1
100 TABLET, FILM COATED ORAL
Refills: 0 | Status: ACTIVE | COMMUNITY

## 2024-12-03 RX ORDER — NAPROXEN 250 MG/1
250 TABLET ORAL
Refills: 0 | Status: ACTIVE | COMMUNITY

## 2024-12-06 RX ORDER — SODIUM SULFATE, POTASSIUM SULFATE AND MAGNESIUM SULFATE 1.6; 3.13; 17.5 G/177ML; G/177ML; G/177ML
17.5-3.13-1.6 SOLUTION ORAL
Qty: 177 | Refills: 0 | Status: ACTIVE | COMMUNITY
Start: 2024-12-06 | End: 1900-01-01

## 2024-12-11 ENCOUNTER — RESULT REVIEW (OUTPATIENT)
Age: 74
End: 2024-12-11

## 2024-12-11 ENCOUNTER — APPOINTMENT (OUTPATIENT)
Dept: GASTROENTEROLOGY | Facility: GI CENTER | Age: 74
End: 2024-12-11
Payer: MEDICARE

## 2024-12-11 PROCEDURE — 45380 COLONOSCOPY AND BIOPSY: CPT

## 2024-12-23 ENCOUNTER — APPOINTMENT (OUTPATIENT)
Dept: GASTROENTEROLOGY | Facility: CLINIC | Age: 74
End: 2024-12-23

## 2025-01-02 ENCOUNTER — APPOINTMENT (OUTPATIENT)
Dept: CARDIOLOGY | Facility: CLINIC | Age: 75
End: 2025-01-02
Payer: MEDICARE

## 2025-01-02 VITALS
SYSTOLIC BLOOD PRESSURE: 114 MMHG | WEIGHT: 216 LBS | HEART RATE: 65 BPM | DIASTOLIC BLOOD PRESSURE: 72 MMHG | HEIGHT: 71 IN | BODY MASS INDEX: 30.24 KG/M2 | RESPIRATION RATE: 16 BRPM | OXYGEN SATURATION: 94 %

## 2025-01-02 DIAGNOSIS — R07.89 OTHER CHEST PAIN: ICD-10-CM

## 2025-01-02 DIAGNOSIS — I48.91 UNSPECIFIED ATRIAL FIBRILLATION: ICD-10-CM

## 2025-01-02 PROCEDURE — 99214 OFFICE O/P EST MOD 30 MIN: CPT

## 2025-01-02 PROCEDURE — G2211 COMPLEX E/M VISIT ADD ON: CPT

## 2025-01-02 PROCEDURE — 93000 ELECTROCARDIOGRAM COMPLETE: CPT

## 2025-01-02 RX ORDER — TELMISARTAN 40 MG/1
40 TABLET ORAL
Qty: 90 | Refills: 3 | Status: ACTIVE | COMMUNITY
Start: 2025-01-02 | End: 1900-01-01

## 2025-01-07 ENCOUNTER — APPOINTMENT (OUTPATIENT)
Dept: ELECTROPHYSIOLOGY | Facility: CLINIC | Age: 75
End: 2025-01-07
Payer: MEDICARE

## 2025-01-07 ENCOUNTER — NON-APPOINTMENT (OUTPATIENT)
Age: 75
End: 2025-01-07

## 2025-01-07 PROCEDURE — 93298 REM INTERROG DEV EVAL SCRMS: CPT

## 2025-01-13 ENCOUNTER — APPOINTMENT (OUTPATIENT)
Dept: UROLOGY | Facility: CLINIC | Age: 75
End: 2025-01-13

## 2025-01-30 ENCOUNTER — APPOINTMENT (OUTPATIENT)
Dept: CARDIOLOGY | Facility: CLINIC | Age: 75
End: 2025-01-30

## 2025-01-30 PROCEDURE — 93306 TTE W/DOPPLER COMPLETE: CPT

## 2025-02-03 ENCOUNTER — RX RENEWAL (OUTPATIENT)
Age: 75
End: 2025-02-03

## 2025-02-05 ENCOUNTER — APPOINTMENT (OUTPATIENT)
Dept: INTERNAL MEDICINE | Facility: CLINIC | Age: 75
End: 2025-02-05
Payer: MEDICARE

## 2025-02-05 VITALS — HEIGHT: 71 IN | OXYGEN SATURATION: 96 % | HEART RATE: 72 BPM | WEIGHT: 210 LBS | BODY MASS INDEX: 29.4 KG/M2

## 2025-02-05 VITALS — DIASTOLIC BLOOD PRESSURE: 72 MMHG | SYSTOLIC BLOOD PRESSURE: 122 MMHG

## 2025-02-05 DIAGNOSIS — I10 ESSENTIAL (PRIMARY) HYPERTENSION: ICD-10-CM

## 2025-02-05 DIAGNOSIS — E78.5 HYPERLIPIDEMIA, UNSPECIFIED: ICD-10-CM

## 2025-02-05 DIAGNOSIS — E11.9 TYPE 2 DIABETES MELLITUS W/OUT COMPLICATIONS: ICD-10-CM

## 2025-02-05 PROCEDURE — 99213 OFFICE O/P EST LOW 20 MIN: CPT

## 2025-02-05 PROCEDURE — G2211 COMPLEX E/M VISIT ADD ON: CPT

## 2025-02-11 ENCOUNTER — APPOINTMENT (OUTPATIENT)
Dept: ELECTROPHYSIOLOGY | Facility: CLINIC | Age: 75
End: 2025-02-11
Payer: MEDICARE

## 2025-02-11 ENCOUNTER — NON-APPOINTMENT (OUTPATIENT)
Age: 75
End: 2025-02-11

## 2025-02-11 PROCEDURE — 93298 REM INTERROG DEV EVAL SCRMS: CPT

## 2025-02-17 ENCOUNTER — APPOINTMENT (OUTPATIENT)
Dept: CT IMAGING | Facility: CLINIC | Age: 75
End: 2025-02-17
Payer: MEDICARE

## 2025-02-17 PROCEDURE — 75571 CT HRT W/O DYE W/CA TEST: CPT

## 2025-02-24 ENCOUNTER — NON-APPOINTMENT (OUTPATIENT)
Age: 75
End: 2025-02-24

## 2025-02-27 DIAGNOSIS — I10 ESSENTIAL (PRIMARY) HYPERTENSION: ICD-10-CM

## 2025-02-27 DIAGNOSIS — I77.810 THORACIC AORTIC ECTASIA: ICD-10-CM

## 2025-03-01 LAB
ALBUMIN SERPL ELPH-MCNC: 4.5 G/DL
ALP BLD-CCNC: 76 U/L
ALT SERPL-CCNC: 37 U/L
ANION GAP SERPL CALC-SCNC: 19 MMOL/L
APTT BLD: 35.2 SEC
AST SERPL-CCNC: 31 U/L
BILIRUB SERPL-MCNC: 0.6 MG/DL
BUN SERPL-MCNC: 15 MG/DL
CALCIUM SERPL-MCNC: 9.2 MG/DL
CHLORIDE SERPL-SCNC: 100 MMOL/L
CHOLEST SERPL-MCNC: 186 MG/DL
CO2 SERPL-SCNC: 21 MMOL/L
CREAT SERPL-MCNC: 0.74 MG/DL
EGFR: 94 ML/MIN/1.73M2
GLUCOSE SERPL-MCNC: 95 MG/DL
HCT VFR BLD CALC: 44.8 %
HDLC SERPL-MCNC: 59 MG/DL
HGB BLD-MCNC: 14.8 G/DL
INR PPP: 0.97 RATIO
LDLC SERPL CALC-MCNC: 115 MG/DL
MCHC RBC-ENTMCNC: 32.7 PG
MCHC RBC-ENTMCNC: 33 G/DL
MCV RBC AUTO: 98.9 FL
NONHDLC SERPL-MCNC: 127 MG/DL
PLATELET # BLD AUTO: 291 K/UL
POTASSIUM SERPL-SCNC: 4.4 MMOL/L
PROT SERPL-MCNC: 7.2 G/DL
PSA SERPL-MCNC: 1.38 NG/ML
PT BLD: 11.4 SEC
RBC # BLD: 4.53 M/UL
RBC # FLD: 13.4 %
SODIUM SERPL-SCNC: 140 MMOL/L
T3FREE SERPL-MCNC: 2.97 PG/ML
T4 FREE SERPL-MCNC: 1.2 NG/DL
TRIGL SERPL-MCNC: 68 MG/DL
TSH SERPL-ACNC: 2.4 UIU/ML
WBC # FLD AUTO: 6.43 K/UL

## 2025-03-07 NOTE — ASU PATIENT PROFILE, ADULT - FALL HARM RISK - UNIVERSAL INTERVENTIONS
Bed in lowest position, wheels locked, appropriate side rails in place/Call bell, personal items and telephone in reach/Instruct patient to call for assistance before getting out of bed or chair/Non-slip footwear when patient is out of bed/[place] to call system/Physically safe environment - no spills, clutter or unnecessary equipment/Purposeful Proactive Rounding/Room/bathroom lighting operational, light cord in reach

## 2025-03-10 ENCOUNTER — OUTPATIENT (OUTPATIENT)
Dept: OUTPATIENT SERVICES | Facility: HOSPITAL | Age: 75
LOS: 1 days | Discharge: HOME | End: 2025-03-10
Payer: MEDICARE

## 2025-03-10 VITALS
HEART RATE: 77 BPM | WEIGHT: 216.05 LBS | HEIGHT: 71 IN | RESPIRATION RATE: 18 BRPM | OXYGEN SATURATION: 95 % | TEMPERATURE: 98 F | SYSTOLIC BLOOD PRESSURE: 146 MMHG | DIASTOLIC BLOOD PRESSURE: 85 MMHG

## 2025-03-10 DIAGNOSIS — I77.810 THORACIC AORTIC ECTASIA: ICD-10-CM

## 2025-03-10 DIAGNOSIS — E78.5 HYPERLIPIDEMIA, UNSPECIFIED: ICD-10-CM

## 2025-03-10 DIAGNOSIS — Z95.818 PRESENCE OF OTHER CARDIAC IMPLANTS AND GRAFTS: Chronic | ICD-10-CM

## 2025-03-10 DIAGNOSIS — R94.39 ABNORMAL RESULT OF OTHER CARDIOVASCULAR FUNCTION STUDY: ICD-10-CM

## 2025-03-10 DIAGNOSIS — Z98.890 OTHER SPECIFIED POSTPROCEDURAL STATES: Chronic | ICD-10-CM

## 2025-03-10 DIAGNOSIS — I10 ESSENTIAL (PRIMARY) HYPERTENSION: ICD-10-CM

## 2025-03-10 DIAGNOSIS — Z98.89 OTHER SPECIFIED POSTPROCEDURAL STATES: Chronic | ICD-10-CM

## 2025-03-10 DIAGNOSIS — I25.10 ATHEROSCLEROTIC HEART DISEASE OF NATIVE CORONARY ARTERY WITHOUT ANGINA PECTORIS: ICD-10-CM

## 2025-03-10 DIAGNOSIS — R93.1 ABNORMAL FINDINGS ON DIAGNOSTIC IMAGING OF HEART AND CORONARY CIRCULATION: ICD-10-CM

## 2025-03-10 LAB — BLD GP AB SCN SERPL QL: SIGNIFICANT CHANGE UP

## 2025-03-10 PROCEDURE — 93458 L HRT ARTERY/VENTRICLE ANGIO: CPT | Mod: 59

## 2025-03-10 PROCEDURE — 86900 BLOOD TYPING SEROLOGIC ABO: CPT

## 2025-03-10 PROCEDURE — C1894: CPT

## 2025-03-10 PROCEDURE — 86901 BLOOD TYPING SEROLOGIC RH(D): CPT

## 2025-03-10 PROCEDURE — 36415 COLL VENOUS BLD VENIPUNCTURE: CPT

## 2025-03-10 PROCEDURE — 80048 BASIC METABOLIC PNL TOTAL CA: CPT

## 2025-03-10 PROCEDURE — 93005 ELECTROCARDIOGRAM TRACING: CPT

## 2025-03-10 PROCEDURE — 92978 ENDOLUMINL IVUS OCT C 1ST: CPT | Mod: RC

## 2025-03-10 PROCEDURE — 82962 GLUCOSE BLOOD TEST: CPT

## 2025-03-10 PROCEDURE — C1753: CPT

## 2025-03-10 PROCEDURE — 0523T NTRAPX C FFR W/3D FUNCJL MAP: CPT

## 2025-03-10 PROCEDURE — C1887: CPT

## 2025-03-10 PROCEDURE — 93010 ELECTROCARDIOGRAM REPORT: CPT

## 2025-03-10 PROCEDURE — 92928 PRQ TCAT PLMT NTRAC ST 1 LES: CPT | Mod: RC

## 2025-03-10 PROCEDURE — 85027 COMPLETE CBC AUTOMATED: CPT

## 2025-03-10 PROCEDURE — C1725: CPT

## 2025-03-10 PROCEDURE — 86850 RBC ANTIBODY SCREEN: CPT

## 2025-03-10 PROCEDURE — 92972 PERQ TRLUML CORONRY LITHOTRP: CPT

## 2025-03-10 PROCEDURE — C1761: CPT

## 2025-03-10 PROCEDURE — C1874: CPT

## 2025-03-10 PROCEDURE — 83036 HEMOGLOBIN GLYCOSYLATED A1C: CPT

## 2025-03-10 PROCEDURE — C1769: CPT

## 2025-03-10 RX ORDER — LOSARTAN POTASSIUM 100 MG/1
50 TABLET, FILM COATED ORAL DAILY
Refills: 0 | Status: DISCONTINUED | OUTPATIENT
Start: 2025-03-10 | End: 2025-03-11

## 2025-03-10 RX ORDER — DAPAGLIFLOZIN 5 MG/1
10 TABLET, FILM COATED ORAL DAILY
Refills: 0 | Status: DISCONTINUED | OUTPATIENT
Start: 2025-03-11 | End: 2025-03-11

## 2025-03-10 RX ORDER — ASPIRIN 325 MG
1 TABLET ORAL
Qty: 30 | Refills: 11
Start: 2025-03-10 | End: 2026-03-04

## 2025-03-10 RX ORDER — GLUCAGON 3 MG/1
1 POWDER NASAL ONCE
Refills: 0 | Status: DISCONTINUED | OUTPATIENT
Start: 2025-03-10 | End: 2025-03-11

## 2025-03-10 RX ORDER — METOPROLOL SUCCINATE 50 MG/1
50 TABLET, EXTENDED RELEASE ORAL EVERY 12 HOURS
Refills: 0 | Status: DISCONTINUED | OUTPATIENT
Start: 2025-03-10 | End: 2025-03-11

## 2025-03-10 RX ORDER — ASPIRIN 325 MG
81 TABLET ORAL DAILY
Refills: 0 | Status: DISCONTINUED | OUTPATIENT
Start: 2025-03-10 | End: 2025-03-11

## 2025-03-10 RX ORDER — ROSUVASTATIN CALCIUM 20 MG/1
40 TABLET, FILM COATED ORAL AT BEDTIME
Refills: 0 | Status: DISCONTINUED | OUTPATIENT
Start: 2025-03-10 | End: 2025-03-11

## 2025-03-10 RX ORDER — OXYCODONE HYDROCHLORIDE 30 MG/1
5 TABLET ORAL EVERY 8 HOURS
Refills: 0 | Status: DISCONTINUED | OUTPATIENT
Start: 2025-03-10 | End: 2025-03-11

## 2025-03-10 RX ORDER — DEXTROSE 50 % IN WATER 50 %
12.5 SYRINGE (ML) INTRAVENOUS ONCE
Refills: 0 | Status: DISCONTINUED | OUTPATIENT
Start: 2025-03-10 | End: 2025-03-11

## 2025-03-10 RX ORDER — CLOPIDOGREL BISULFATE 75 MG/1
75 TABLET, FILM COATED ORAL DAILY
Refills: 0 | Status: DISCONTINUED | OUTPATIENT
Start: 2025-03-11 | End: 2025-03-11

## 2025-03-10 RX ORDER — ACETAMINOPHEN 500 MG/5ML
650 LIQUID (ML) ORAL EVERY 6 HOURS
Refills: 0 | Status: DISCONTINUED | OUTPATIENT
Start: 2025-03-10 | End: 2025-03-11

## 2025-03-10 RX ORDER — DEXTROSE 50 % IN WATER 50 %
15 SYRINGE (ML) INTRAVENOUS ONCE
Refills: 0 | Status: DISCONTINUED | OUTPATIENT
Start: 2025-03-10 | End: 2025-03-11

## 2025-03-10 RX ORDER — DEXTROSE 50 % IN WATER 50 %
25 SYRINGE (ML) INTRAVENOUS ONCE
Refills: 0 | Status: DISCONTINUED | OUTPATIENT
Start: 2025-03-10 | End: 2025-03-11

## 2025-03-10 RX ORDER — ROSUVASTATIN CALCIUM 20 MG/1
1 TABLET, FILM COATED ORAL
Qty: 30 | Refills: 3
Start: 2025-03-10 | End: 2025-07-07

## 2025-03-10 RX ORDER — METOPROLOL SUCCINATE 50 MG/1
50 TABLET, EXTENDED RELEASE ORAL DAILY
Refills: 0 | Status: DISCONTINUED | OUTPATIENT
Start: 2025-03-10 | End: 2025-03-10

## 2025-03-10 RX ORDER — SODIUM CHLORIDE 9 G/1000ML
1000 INJECTION, SOLUTION INTRAVENOUS
Refills: 0 | Status: DISCONTINUED | OUTPATIENT
Start: 2025-03-10 | End: 2025-03-11

## 2025-03-10 RX ORDER — MAGNESIUM, ALUMINUM HYDROXIDE 200-200 MG
30 TABLET,CHEWABLE ORAL EVERY 6 HOURS
Refills: 0 | Status: DISCONTINUED | OUTPATIENT
Start: 2025-03-10 | End: 2025-03-11

## 2025-03-10 RX ORDER — FINASTERIDE 1 MG/1
5 TABLET, FILM COATED ORAL DAILY
Refills: 0 | Status: DISCONTINUED | OUTPATIENT
Start: 2025-03-10 | End: 2025-03-11

## 2025-03-10 RX ORDER — CLOPIDOGREL BISULFATE 75 MG/1
1 TABLET, FILM COATED ORAL
Qty: 30 | Refills: 11
Start: 2025-03-10 | End: 2026-03-04

## 2025-03-10 RX ORDER — INSULIN LISPRO 100 U/ML
INJECTION, SOLUTION INTRAVENOUS; SUBCUTANEOUS
Refills: 0 | Status: DISCONTINUED | OUTPATIENT
Start: 2025-03-10 | End: 2025-03-11

## 2025-03-10 RX ADMIN — Medication 250 MILLILITER(S): at 12:16

## 2025-03-10 RX ADMIN — Medication 250 MILLILITER(S): at 17:30

## 2025-03-10 NOTE — H&P ADULT - NSHPLABSRESULTS_GEN_ALL_CORE
3/10/25 EKG  NSR    2/17/25 Calcium Score  4859  Main 290      RCA 2787    1/30/25 TTE  EF 65%  Mild to moderate AR/MI

## 2025-03-10 NOTE — BRIEF OPERATIVE NOTE - OPERATION/FINDINGS
multiple areas of obstructive calcified plaque in RCA  Critical calcified plaque in D1  non obstructive CAD in LAD

## 2025-03-10 NOTE — H&P ADULT - HISTORY OF PRESENT ILLNESS
76 y/o with PMH DMII, prox Afib ( unable to tolerate DOAC), SVT ( 2022),  ILR, HLD, HTN and significant family hx of DM, CAD, MI presented to cardiology with c/o increasing shortness of breath. Pt had calcium score of 4859 ( main 290,, , RCA 2787) pt has EF 60-65% with mild to moderate AR/WY. Pt has been referred to interventional cardiology for further evaluation.

## 2025-03-10 NOTE — H&P ADULT - NSHPOUTPATIENTPROVIDERS_GEN_ALL_CORE
KIMBERLYN Link Consent: The patient's consent was obtained including but not limited to risks of crusting, scabbing, blistering, scarring, darker or lighter pigmentary change, recurrence, incomplete removal and infection.

## 2025-03-10 NOTE — H&P ADULT - ASSESSMENT
76 y/o with PMH DMII, prox Afib ( unable to tolerate DOAC), SVT ( 2022),  ILR, HLD, HTN and significant family hx of DM, CAD, MI presented to cardiology with c/o increasing shortness of breath. Pt had calcium score of 4859 ( main 290,, , RCA 2787) pt has EF 60-65% with mild to moderate AR/NV. Pt has been referred to interventional cardiology for further evaluation.     ASA class: II  Creatinine: 0.74  GFR: 94  Bleeding  Risk score: 1.0%  Wilfredo Score:  8 pts

## 2025-03-10 NOTE — PROGRESS NOTE ADULT - SUBJECTIVE AND OBJECTIVE BOX
Department of Cardiology                                                               Division of Interventional Cardiology                                                               [facility] /[address] [address]                                                                                 [phone]           Post Procedure Note    HPI:  76 y/o with PMH DMII, prox Afib ( unable to tolerate DOAC), SVT ( 2022),  ILR, HLD, HTN and significant family hx of DM, CAD, MI presented to cardiology with c/o increasing shortness of breath. Pt had calcium score of 4859 ( main 290,, , RCA 2787) pt has EF 60-65% with mild to moderate AR/WA. Pt has been referred to interventional cardiology for further evaluation.  (10 Mar 2025 12:47)    s/p LHC : CHENCHO x 2 with lithotripsy/oct/cathworks  to RCA     Pt denies chest pain/SOB/palpitations post cath.    Vital Signs  Vital Signs Last 24 Hrs  T(C): 36.1 (10 Mar 2025 17:25), Max: 36.5 (10 Mar 2025 11:53)  T(F): 97 (10 Mar 2025 17:25), Max: 97.7 (10 Mar 2025 11:53)  HR: 66 (10 Mar 2025 17:35) (66 - 77)  BP: 128/90 (10 Mar 2025 17:45) (128/90 - 157/79)  BP(mean): --  RR: 16 (10 Mar 2025 17:35) (16 - 18)  SpO2: 97% (10 Mar 2025 17:35) (95% - 97%)    Parameters below as of 10 Mar 2025 17:35  Patient On (Oxygen Delivery Method): room air    MEDICATIONS  (STANDING):  aspirin enteric coated 81 milliGRAM(s) Oral daily  dextrose 5%. 1000 milliLiter(s) (100 mL/Hr) IV Continuous <Continuous>  dextrose 5%. 1000 milliLiter(s) (50 mL/Hr) IV Continuous <Continuous>  dextrose 50% Injectable 25 Gram(s) IV Push once  dextrose 50% Injectable 12.5 Gram(s) IV Push once  dextrose 50% Injectable 25 Gram(s) IV Push once  finasteride 5 milliGRAM(s) Oral daily  glucagon  Injectable 1 milliGRAM(s) IntraMuscular once  insulin lispro (ADMELOG) corrective regimen sliding scale   SubCutaneous three times a day before meals  losartan 50 milliGRAM(s) Oral daily  metoprolol succinate ER 50 milliGRAM(s) Oral every 12 hours  rosuvastatin 40 milliGRAM(s) Oral at bedtime  sodium chloride 0.9%. 1000 milliLiter(s) (250 mL/Hr) IV Continuous <Continuous>      PHYSICAL EXAM  GENERAL: NAD, AAOx3  CHEST/LUNG: Clear to auscultation bilaterally; No wheeze  HEART: s1 s2 Regular rate and rhythm; No murmurs, rubs, or gallops  ABDOMEN: Soft, Nontender, Nondistended; Bowel sounds present X 4 quadrants   EXTREMITIES:  2+ Peripheral Pulses, No clubbing, cyanosis, or edema  Psych: normal affect and behavior, calm and cooperative   PROCEDURE SITE: RRA band to be removed two hours post placement ,Site is without hematoma or bleeding. Sensation and KELVIN intact. Distal pulses palpable 2+, capillary refill < 2 seconds. Patient denies pain, numbness, tingling, CP or SOB.      EKG: post pci  SR with first degree block    PROCEDURE RESULTS     ASSESSMENT : HPI:  76 y/o with PMH DMII, prox Afib ( unable to tolerate DOAC), SVT ( 2022),  ILR, HLD, HTN and significant family hx of DM, CAD, MI presented to cardiology with c/o increasing shortness of breath. Pt had calcium score of 4859 ( main 290,, , RCA 2787) pt has EF 60-65% with mild to moderate AR/WA. Pt has been referred to interventional cardiology for further evaluation.  (10 Mar 2025 12:47)    PLAN:  #CAD  - s/p CHENCHO x 2 with lithotripsy/oct/cathworks  to RCA   -Admit to tele for overnight observation   -band to be removed in CCU for recovery then transfer to floor 30 mins after if stable  -IV hydration per KRISHAN protocol   -VS, lab, diet and activity per PCI post orders  -continue BB  -Increase statin to 40mg from 10mg  -hold metformin for 48 hrs post cath- may resume on 10/13/25  -ISS while admitted  -Begin asa/plavix- scripts sent  -f/u EKG in AM, AM Labs  -Discussed therapeutic lifestyle changes to reduce risk factors such as following a cardiac diet, weight loss, maintaining a healthy weight, exercise, smoking cessation, medication compliance, and regular follow-up  with PCP/Cardioloigst  - stent card given to patient/family -----  -Qualified for cardiac rehab. Patient educated on cardiac rehab. Information sheet provided and left with patient  --Post cath instructions reviewed, post sedation instructions reviewed,  patient verbalizes and understands instructions  -Discussed plan with patient, family, RN, and Dr Lee

## 2025-03-10 NOTE — BRIEF OPERATIVE NOTE - COMMENTS
Pt had two stents placed with lithotripsy, OCT, cathworks for precision.   RRA band placed without complications  pt tolerated weel

## 2025-03-10 NOTE — BRIEF OPERATIVE NOTE - ESTIMATED BLOOD LOSS
5 Gabapentin Counseling: I discussed with the patient the risks of gabapentin including but not limited to dizziness, somnolence, fatigue and ataxia.

## 2025-03-10 NOTE — ASU PREOP CHECKLIST - PATIENT'S PERSONAL PROPERTY REMOVED
Assessment/Plan:    Class 2 obesity in adult  Actively working on losing weight  Status post medial meniscectomy of right knee  Right knee injury this weekend running after dog  Appointment Monday with ortho  Will use voltaren getl 1% 4g apply topically to right knee for pain relief  Severe obstructive sleep apnea  Continues to be compliant with CPAP despite recall  Emailed company and awaiting response    Hyperlipidemia  Repeat Lipid panel  Hesitant to start statins due to side effects  Discussed other rx options  Will get labwork and discuss future plan of care  Continue dietary modification       Diagnoses and all orders for this visit:    Class 2 severe obesity with serious comorbidity and body mass index (BMI) of 38 0 to 38 9 in adult, unspecified obesity type ([facility] [address] )    Screening for colon cancer  -     Ambulatory referral for colonoscopy; Future    CPAP (continuous positive airway pressure) dependence    BMI 38 0-38 9,adult    Mixed hyperlipidemia  -     Lipid panel; Future  -     Lipid panel    Benign essential hypertension    Severe obstructive sleep apnea    Status post medial meniscectomy of right knee  -     Diclofenac Sodium (VOLTAREN) 1 %; Apply 4 g topically 4 (four) times a day Apply 4g to right knee up to 4 times daily for pain relief  Subjective:      Patient ID: Prince Runner is a 60 y o  male  Had to zarina after the dog this weekend and felt the right knee give out  Shooting pain medial meniscal   Difficulty walking up steps  +Antalgic gait  Denies falls  Has appointment Monday with ortho  Got notice of CPAP recall, continuing to use device while awaiting email response  Recently retired 1 month ago and keeping busy  Lost 9lb intentionally to promote health and better cholesterol          The following portions of the patient's history were reviewed and updated as appropriate: allergies, current medications, past family history, past medical history, past social history, past surgical history and problem list     Review of Systems   Constitutional: Negative  Negative for activity change, appetite change, chills, fatigue and fever  HENT: Negative  Negative for congestion, ear pain, postnasal drip and sinus pain  Eyes: Negative  Respiratory: Negative  Negative for cough and shortness of breath  Cardiovascular: Negative  Negative for chest pain and leg swelling  Gastrointestinal: Negative  Negative for constipation and diarrhea  Endocrine: Negative  Genitourinary: Negative  Negative for dysuria  Musculoskeletal: Positive for arthralgias  Skin: Negative  Allergic/Immunologic: Negative  Negative for immunocompromised state  Neurological: Negative  Negative for dizziness and light-headedness  Hematological: Negative  Psychiatric/Behavioral: Negative  Objective:      /86   Pulse 61   Ht 5' 7" (1 702 m)   Wt 111 kg (244 lb)   SpO2 96%   BMI 38 22 kg/m²          Physical Exam  Vitals and nursing note reviewed  Constitutional:       Appearance: Normal appearance  He is obese  HENT:      Head: Normocephalic and atraumatic  Right Ear: Tympanic membrane, ear canal and external ear normal       Left Ear: Tympanic membrane, ear canal and external ear normal       Nose: Nose normal       Mouth/Throat:      Mouth: Mucous membranes are moist       Pharynx: Oropharynx is clear  Eyes:      Extraocular Movements: Extraocular movements intact  Conjunctiva/sclera: Conjunctivae normal       Pupils: Pupils are equal, round, and reactive to light  Cardiovascular:      Rate and Rhythm: Normal rate and regular rhythm  Pulses: Normal pulses  Heart sounds: Normal heart sounds  Pulmonary:      Effort: Pulmonary effort is normal       Breath sounds: Normal breath sounds  Abdominal:      General: Bowel sounds are normal       Palpations: Abdomen is soft     Musculoskeletal:         General: Tenderness and signs of injury present  Normal range of motion  Cervical back: Normal range of motion and neck supple  Comments: +tenderness medial meniscus   Skin:     General: Skin is warm and dry  Neurological:      General: No focal deficit present  Mental Status: He is alert and oriented to person, place, and time  Psychiatric:         Mood and Affect: Mood normal          Behavior: Behavior normal          Thought Content: Thought content normal          Judgment: Judgment normal          BMI Counseling: Body mass index is 38 22 kg/m²  The BMI is above normal  Nutrition recommendations include reducing portion sizes, 3-5 servings of fruits/vegetables daily, consuming healthier snacks and reducing intake of saturated fat and trans fat  Exercise recommendations include exercising 3-5 times per week and strength training exercises  glasses/hearing aids

## 2025-03-10 NOTE — PACU DISCHARGE NOTE - COMMENTS
pt verbalizes understanding of post procedure care education emotional support provided report given to Juany  ccu transfer with nurse and monitor maintained safety

## 2025-03-10 NOTE — H&P ADULT - PROBLEM SELECTOR PLAN 1
KRISHAN prehydration     LHC    -Consent obtained by interventional cardiologist for cardiac catheterization w/ coronary angiogram, possible sedation and/or analgesia and possible stent placement. Pt is competent, has capacity, and understands risks and benefits of procedure. Risks and benefits discussed. Risk discussed included, but not limited to MI, stroke, mortality, major bleeding, arrythmia, or infection. All questions answered

## 2025-03-11 ENCOUNTER — TRANSCRIPTION ENCOUNTER (OUTPATIENT)
Age: 75
End: 2025-03-11

## 2025-03-11 VITALS
HEART RATE: 73 BPM | SYSTOLIC BLOOD PRESSURE: 142 MMHG | DIASTOLIC BLOOD PRESSURE: 93 MMHG | OXYGEN SATURATION: 93 % | RESPIRATION RATE: 18 BRPM | TEMPERATURE: 98 F

## 2025-03-11 LAB
A1C WITH ESTIMATED AVERAGE GLUCOSE RESULT: 5.6 % — SIGNIFICANT CHANGE UP (ref 4–5.6)
ANION GAP SERPL CALC-SCNC: 6 MMOL/L — SIGNIFICANT CHANGE UP (ref 5–17)
BUN SERPL-MCNC: 15 MG/DL — SIGNIFICANT CHANGE UP (ref 7–23)
CALCIUM SERPL-MCNC: 9 MG/DL — SIGNIFICANT CHANGE UP (ref 8.5–10.1)
CHLORIDE SERPL-SCNC: 105 MMOL/L — SIGNIFICANT CHANGE UP (ref 96–108)
CO2 SERPL-SCNC: 23 MMOL/L — SIGNIFICANT CHANGE UP (ref 22–31)
CREAT SERPL-MCNC: 0.66 MG/DL — SIGNIFICANT CHANGE UP (ref 0.5–1.3)
EGFR: 97 ML/MIN/1.73M2 — SIGNIFICANT CHANGE UP
EGFR: 97 ML/MIN/1.73M2 — SIGNIFICANT CHANGE UP
ESTIMATED AVERAGE GLUCOSE: 114 MG/DL — SIGNIFICANT CHANGE UP (ref 68–114)
GLUCOSE BLDC GLUCOMTR-MCNC: 139 MG/DL — HIGH (ref 70–99)
GLUCOSE SERPL-MCNC: 139 MG/DL — HIGH (ref 70–99)
HCT VFR BLD CALC: 43.1 % — SIGNIFICANT CHANGE UP (ref 39–50)
HGB BLD-MCNC: 14.6 G/DL — SIGNIFICANT CHANGE UP (ref 13–17)
MCHC RBC-ENTMCNC: 32.7 PG — SIGNIFICANT CHANGE UP (ref 27–34)
MCHC RBC-ENTMCNC: 33.9 G/DL — SIGNIFICANT CHANGE UP (ref 32–36)
MCV RBC AUTO: 96.4 FL — SIGNIFICANT CHANGE UP (ref 80–100)
NRBC # BLD AUTO: 0 K/UL — SIGNIFICANT CHANGE UP (ref 0–0)
NRBC # FLD: 0 K/UL — SIGNIFICANT CHANGE UP (ref 0–0)
NRBC BLD AUTO-RTO: 0 /100 WBCS — SIGNIFICANT CHANGE UP (ref 0–0)
PLATELET # BLD AUTO: 249 K/UL — SIGNIFICANT CHANGE UP (ref 150–400)
PMV BLD: 9.7 FL — SIGNIFICANT CHANGE UP (ref 7–13)
POTASSIUM SERPL-MCNC: 4 MMOL/L — SIGNIFICANT CHANGE UP (ref 3.5–5.3)
POTASSIUM SERPL-SCNC: 4 MMOL/L — SIGNIFICANT CHANGE UP (ref 3.5–5.3)
RBC # BLD: 4.47 M/UL — SIGNIFICANT CHANGE UP (ref 4.2–5.8)
RBC # FLD: 12.8 % — SIGNIFICANT CHANGE UP (ref 10.3–14.5)
SODIUM SERPL-SCNC: 134 MMOL/L — LOW (ref 135–145)
WBC # BLD: 6.16 K/UL — SIGNIFICANT CHANGE UP (ref 3.8–10.5)
WBC # FLD AUTO: 6.16 K/UL — SIGNIFICANT CHANGE UP (ref 3.8–10.5)

## 2025-03-11 RX ADMIN — CLOPIDOGREL BISULFATE 75 MILLIGRAM(S): 75 TABLET, FILM COATED ORAL at 09:06

## 2025-03-11 RX ADMIN — METOPROLOL SUCCINATE 50 MILLIGRAM(S): 50 TABLET, EXTENDED RELEASE ORAL at 04:20

## 2025-03-11 RX ADMIN — Medication 81 MILLIGRAM(S): at 09:06

## 2025-03-11 RX ADMIN — DAPAGLIFLOZIN 10 MILLIGRAM(S): 5 TABLET, FILM COATED ORAL at 09:06

## 2025-03-11 RX ADMIN — METOPROLOL SUCCINATE 50 MILLIGRAM(S): 50 TABLET, EXTENDED RELEASE ORAL at 09:06

## 2025-03-11 RX ADMIN — FINASTERIDE 5 MILLIGRAM(S): 1 TABLET, FILM COATED ORAL at 09:05

## 2025-03-11 RX ADMIN — LOSARTAN POTASSIUM 50 MILLIGRAM(S): 100 TABLET, FILM COATED ORAL at 09:06

## 2025-03-11 RX ADMIN — ROSUVASTATIN CALCIUM 40 MILLIGRAM(S): 20 TABLET, FILM COATED ORAL at 04:20

## 2025-03-11 NOTE — DISCHARGE NOTE PROVIDER - HOSPITAL COURSE
76 y/o male presented to [facility] for cardiac cath after an elevated calcium score.  Left heart cath with prehydration  lithotripsy/OCT/Cathworks performed  CHENCHO x 2 to prox and mid RCA  Pt has moderate disease of the d1  non obstructive disease of the LAD  Observed on tele overnight  no adverse events overnight  Morning EKG/LABS  discharge to home on aspirin/plavix

## 2025-03-11 NOTE — DISCHARGE NOTE PROVIDER - NSDCCPCAREPLAN_GEN_ALL_CORE_FT
PRINCIPAL DISCHARGE DIAGNOSIS  Diagnosis: CAD, multiple vessel  Assessment and Plan of Treatment: PCI x 2 to RCA, medical management for CAD in other vessels

## 2025-03-11 NOTE — DISCHARGE NOTE NURSING/CASE MANAGEMENT/SOCIAL WORK - NSDCPEFALRISK_GEN_ALL_CORE
For information on Fall & Injury Prevention, visit: [email or web address] OR  [email or web address] OR  https://www.cdc.gov/steadi/patient.html

## 2025-03-11 NOTE — DISCHARGE NOTE PROVIDER - NSDCFUSCHEDAPPT_GEN_ALL_CORE_FT
[facility]  ELECTROPH [address]  Scheduled Appointment: 03/14/2025    Lyric Velazquez  [facility]  CARDIOLOGY [address]  Scheduled Appointment: 05/27/2025

## 2025-03-11 NOTE — DISCHARGE NOTE NURSING/CASE MANAGEMENT/SOCIAL WORK - FINANCIAL ASSISTANCE
[facility] provides services at a reduced cost to those who are determined to be eligible through [facility]’s financial assistance program. Information regarding [facility]’s financial assistance program can be found by going to [email or web address] or by calling [phone].

## 2025-03-11 NOTE — DISCHARGE NOTE NURSING/CASE MANAGEMENT/SOCIAL WORK - PATIENT PORTAL LINK FT
You can access the FollowMyHealth Patient Portal offered by [facility] by registering at the following website: [email or web address]. By joining [employer]’s FollowMyHealth portal, you will also be able to view your health information using other applications (apps) compatible with our system.

## 2025-03-11 NOTE — DISCHARGE NOTE PROVIDER - NSDCMRMEDTOKEN_GEN_ALL_CORE_FT
alfuzosin 10 mg oral tablet, extended release: 1 orally once a day  aspirin 81 mg oral delayed release tablet: 1 tab(s) orally once a day  Farxiga 10 mg oral tablet: 1 tab(s) orally once a day  finasteride 5 mg oral tablet: 1 orally once a day  glipizide-metformin 2.5 mg-500 mg oral tablet: 1 tablet orally 2 times a day may resume on 3/13/25  metoprolol succinate 50 mg oral tablet, extended release: 1 tab(s) orally 2 times a day  Myrbetriq 50 mg oral tablet, extended release: 1 orally once a day  naproxen 250 mg oral tablet: 1 tab(s) orally 2 times a day as needed for  moderate pain  Plavix 75 mg oral tablet: 1 tab(s) orally once a day  rosuvastatin 40 mg oral tablet: 1 tab(s) orally once a day (at bedtime)  telmisartan-hydrochlorothiazide 40 mg-12.5 mg oral tablet: 1 orally once a day

## 2025-03-11 NOTE — PROGRESS NOTE ADULT - SUBJECTIVE AND OBJECTIVE BOX
Department of Cardiology                                                               Division of Interventional Cardiology                                                               [facility] /[address] [address]                                                                                 [phone]                                                                                                                          Interventional Cardiology Nurse Practitioner Progress  Note    Patient is a 75y old  Male who presents with a chief complaint of LHC (10 Mar 2025 18:03)  74 y/o with PMH DMII, prox Afib ( unable to tolerate DOAC), SVT ( 2022),  ILR, HLD, HTN and significant family hx of DM, CAD, MI presented to cardiology with c/o increasing shortness of breath. Pt had calcium score of 4859 ( main 290,, , RCA 2787) pt has EF 60-65% with mild to moderate AR/CT. Pt has been referred to interventional cardiology for further evaluation.     Vital Signs Last 24 Hrs  T(C): 36.5 (11 Mar 2025 00:18), Max: 36.6 (10 Mar 2025 19:16)  T(F): 97.7 (11 Mar 2025 00:18), Max: 97.8 (10 Mar 2025 19:16)  HR: 77 (11 Mar 2025 00:18) (66 - 77)  BP: 136/72 (11 Mar 2025 00:18) (128/90 - 158/78)  BP(mean): 107 (10 Mar 2025 20:37) (101 - 107)  RR: 18 (11 Mar 2025 00:18) (15 - 19)  SpO2: 92% (11 Mar 2025 00:18) (92% - 99%)    Parameters below as of 10 Mar 2025 17:45  Patient On (Oxygen Delivery Method): room air        PHYSICAL EXAM:  NEURO: Non-focal, AxOx3.  No neuro deficits NECK: Supple, No JVD, No LAD  CHEST/LUNG: Clear to auscultation bilaterally; No wheeze  HEART: s1 s2 Regular rate and rhythm; No murmurs, rubs, or gallops  ABDOMEN: Soft, Nontender, Nondistended; Bowel sounds present X 4 quadrants   EXTREMITIES:  2+ Peripheral Pulses, No clubbing, cyanosis, or edema   VASCULAR: Peripheral pulses palpable 2+ bilaterally        MEDICATIONS  (STANDING):  aspirin enteric coated 81 milliGRAM(s) Oral daily  clopidogrel Tablet 75 milliGRAM(s) Oral daily  dapagliflozin 10 milliGRAM(s) Oral daily  dextrose 5%. 1000 milliLiter(s) (100 mL/Hr) IV Continuous <Continuous>  dextrose 5%. 1000 milliLiter(s) (50 mL/Hr) IV Continuous <Continuous>  dextrose 50% Injectable 25 Gram(s) IV Push once  dextrose 50% Injectable 12.5 Gram(s) IV Push once  dextrose 50% Injectable 25 Gram(s) IV Push once  finasteride 5 milliGRAM(s) Oral daily  glucagon  Injectable 1 milliGRAM(s) IntraMuscular once  hydrochlorothiazide 12.5 milliGRAM(s) Oral daily  insulin lispro (ADMELOG) corrective regimen sliding scale   SubCutaneous three times a day before meals  losartan 50 milliGRAM(s) Oral daily  metoprolol succinate ER 50 milliGRAM(s) Oral every 12 hours  rosuvastatin 40 milliGRAM(s) Oral at bedtime  sodium chloride 0.9%. 1000 milliLiter(s) (250 mL/Hr) IV Continuous <Continuous>      RADIOLOGY     EKG  post cath day 1  SR with 1st degree    Plan:  Discharge home today, follow up with Dr Link within two weeks  Resume Metformin on 3/13/25  Resume all other medications as prescribed  Begin Plavix/Asa - all scripts have been sent to [facility]  Stent card given to patient.   Cardiac rehab information provided to patient  education provided on post cardiac catheterization care, post sedation care,. pt verbalized understanding  Extensive education provided on the importance of refraining from alcohol, continuation of medication without interruption, and healthy lifestyle changes.   Department of Cardiology                                                               Division of Interventional Cardiology                                                               [facility] /[address] [address]                                                                                 [phone]                                                                                                                          Interventional Cardiology Nurse Practitioner Progress  Note    Patient is a 75y old  Male who presents with a chief complaint of LHC (10 Mar 2025 18:03)  76 y/o with PMH DMII, prox Afib ( unable to tolerate DOAC), SVT ( 2022),  ILR, HLD, HTN and significant family hx of DM, CAD, MI presented to cardiology with c/o increasing shortness of breath. Pt had calcium score of 4859 ( main 290,, , RCA 2787) pt has EF 60-65% with mild to moderate AR/[place]. Pt has been referred to interventional cardiology for further evaluation.     Vital Signs Last 24 Hrs  T(C): 36.5 (11 Mar 2025 00:18), Max: 36.6 (10 Mar 2025 19:16)  T(F): 97.7 (11 Mar 2025 00:18), Max: 97.8 (10 Mar 2025 19:16)  HR: 77 (11 Mar 2025 00:18) (66 - 77)  BP: 136/72 (11 Mar 2025 00:18) (128/90 - 158/78)  BP(mean): 107 (10 Mar 2025 20:37) (101 - 107)  RR: 18 (11 Mar 2025 00:18) (15 - 19)  SpO2: 92% (11 Mar 2025 00:18) (92% - 99%)    Parameters below as of 10 Mar 2025 17:45  Patient On (Oxygen Delivery Method): room air        PHYSICAL EXAM:  NEURO: Non-focal, AxOx3.  No neuro deficits NECK: Supple, No JVD, No LAD  CHEST/LUNG: Clear to auscultation bilaterally; No wheeze  HEART: s1 s2 Regular rate and rhythm; No murmurs, rubs, or gallops  ABDOMEN: Soft, Nontender, Nondistended; Bowel sounds present X 4 quadrants   EXTREMITIES:  2+ Peripheral Pulses, No clubbing, cyanosis, or edema   VASCULAR: Peripheral pulses palpable 2+ bilaterally        MEDICATIONS  (STANDING):  aspirin enteric coated 81 milliGRAM(s) Oral daily  clopidogrel Tablet 75 milliGRAM(s) Oral daily  dapagliflozin 10 milliGRAM(s) Oral daily  dextrose 5%. 1000 milliLiter(s) (100 mL/Hr) IV Continuous <Continuous>  dextrose 5%. 1000 milliLiter(s) (50 mL/Hr) IV Continuous <Continuous>  dextrose 50% Injectable 25 Gram(s) IV Push once  dextrose 50% Injectable 12.5 Gram(s) IV Push once  dextrose 50% Injectable 25 Gram(s) IV Push once  finasteride 5 milliGRAM(s) Oral daily  glucagon  Injectable 1 milliGRAM(s) IntraMuscular once  hydrochlorothiazide 12.5 milliGRAM(s) Oral daily  insulin lispro (ADMELOG) corrective regimen sliding scale   SubCutaneous three times a day before meals  losartan 50 milliGRAM(s) Oral daily  metoprolol succinate ER 50 milliGRAM(s) Oral every 12 hours  rosuvastatin 40 milliGRAM(s) Oral at bedtime  sodium chloride 0.9%. 1000 milliLiter(s) (250 mL/Hr) IV Continuous <Continuous>    LABS: All Labs Reviewed:                        14.6   6.16  )-----------( 249      ( 11 Mar 2025 07:20 )             43.1     03-11    134[L]  |  105  |  15  ----------------------------<  139[H]  4.0   |  23  |  0.66    Ca    9.0      11 Mar 2025 07:20  RADIOLOGY     EKG  post cath day 1  SR with 1st degree    Plan:  Discharge home today, follow up with Dr Link within two weeks  Resume Metformin on 3/13/25  Resume all other medications as prescribed  Begin Plavix/Asa - all scripts have been sent to [facility]  Stent card given to patient.   Cardiac rehab information provided to patient  education provided on post cardiac catheterization care, post sedation care,. pt verbalized understanding  Extensive education provided on the importance of refraining from alcohol, continuation of medication without interruption, and healthy lifestyle changes.

## 2025-03-11 NOTE — DISCHARGE NOTE PROVIDER - NSDCCPTREATMENT_GEN_ALL_CORE_FT
PRINCIPAL PROCEDURE  Procedure: Left heart cardiac cath  Findings and Treatment: medical management for CAD/ DAPT for PCI

## 2025-03-11 NOTE — DISCHARGE NOTE PROVIDER - NSDCFUADDINST_GEN_ALL_CORE_FT
No heavily lifting ( more than 10lbs) for 5 days, no submerging your right wrist in water ( no hot tubs, bath tubs, swimming) for five days. Do not begin strenuous exercise program until cleared by your cardiologist. Please call Dr Chavez office and follow up with him within two weeks.   Your scripts have been sent to [facility] , please pick them up in a timely manner, do not miss any doses of DAPT, do not discontinue any medication prior to speaking with your cardiologist.

## 2025-03-11 NOTE — DISCHARGE NOTE PROVIDER - CARE PROVIDER_API CALL
Bang Link  Cardiovascular Disease  [address], [address]  [place] [address]  Phone: [phone]  Fax: [phone]  Follow Up Time:

## 2025-03-13 RX ORDER — METOPROLOL SUCCINATE 50 MG/1
1 TABLET, EXTENDED RELEASE ORAL
Refills: 0 | DISCHARGE

## 2025-03-13 RX ORDER — ROSUVASTATIN CALCIUM 20 MG/1
1 TABLET, FILM COATED ORAL
Refills: 0 | DISCHARGE

## 2025-03-13 RX ORDER — DAPAGLIFLOZIN 5 MG/1
1 TABLET, FILM COATED ORAL
Refills: 0 | DISCHARGE

## 2025-03-13 RX ORDER — NAPROXEN SODIUM 275 MG
1 TABLET ORAL
Refills: 0 | DISCHARGE

## 2025-03-14 ENCOUNTER — APPOINTMENT (OUTPATIENT)
Dept: ELECTROPHYSIOLOGY | Facility: CLINIC | Age: 75
End: 2025-03-14
Payer: MEDICARE

## 2025-03-14 ENCOUNTER — NON-APPOINTMENT (OUTPATIENT)
Age: 75
End: 2025-03-14

## 2025-03-14 PROCEDURE — 93298 REM INTERROG DEV EVAL SCRMS: CPT

## 2025-03-18 NOTE — POST DISCHARGE NOTE - DETAILS:
Post procedure phone call completed; patient understood all discharge paperwork. No questions regarding medications or pain management. MD follow up appointment made. Patient was able to rest when they were discharged. Patient will recommend [facility], no complaints of hospital stay, satisfied with care. Instructed patient to contact provider with any further questions or concerns.

## 2025-03-20 ENCOUNTER — RX RENEWAL (OUTPATIENT)
Age: 75
End: 2025-03-20

## 2025-03-26 ENCOUNTER — APPOINTMENT (OUTPATIENT)
Dept: CARDIOLOGY | Facility: CLINIC | Age: 75
End: 2025-03-26
Payer: MEDICARE

## 2025-03-26 VITALS
HEART RATE: 76 BPM | BODY MASS INDEX: 29.82 KG/M2 | WEIGHT: 213 LBS | SYSTOLIC BLOOD PRESSURE: 118 MMHG | OXYGEN SATURATION: 97 % | HEIGHT: 71 IN | DIASTOLIC BLOOD PRESSURE: 76 MMHG

## 2025-03-26 DIAGNOSIS — E11.9 TYPE 2 DIABETES MELLITUS W/OUT COMPLICATIONS: ICD-10-CM

## 2025-03-26 DIAGNOSIS — I77.810 THORACIC AORTIC ECTASIA: ICD-10-CM

## 2025-03-26 PROCEDURE — 99214 OFFICE O/P EST MOD 30 MIN: CPT

## 2025-03-26 PROCEDURE — 93000 ELECTROCARDIOGRAM COMPLETE: CPT

## 2025-03-26 PROCEDURE — G2211 COMPLEX E/M VISIT ADD ON: CPT

## 2025-03-26 RX ORDER — CLOPIDOGREL BISULFATE 75 MG/1
75 TABLET, FILM COATED ORAL
Qty: 90 | Refills: 1 | Status: ACTIVE | COMMUNITY
Start: 2025-03-26

## 2025-03-27 ENCOUNTER — NON-APPOINTMENT (OUTPATIENT)
Age: 75
End: 2025-03-27

## 2025-03-28 NOTE — ED ADULT NURSE NOTE - NSFALLASSESSNEED_ED_ALL_ED
"2025     Elder Almeida MD  [address]  [place] [address]  [facility] [address]    Patient: Kashmir Moura   YOB: 1955   Date of Visit: 3/28/2025     Dear Elder Almeida MD:       Thank you for referring Kashmir Moura to me for evaluation. Below are the relevant portions of my assessment and plan of care.    If you have questions, please do not hesitate to call me. I look forward to following Kashmir along with you.         Sincerely,        Kemal Guevara MD        CC: MD Ismael Zurita Jamie D, MD  [phone]  Sign when Signing Visit  Date of Office Visit: 25  Encounter Provider: Kemal Guevara MD  Place of Service: [facility] CARDIOLOGY  Patient Name: Kashmir Moura  :1955    Chief Complaint   Patient presents with   • Irregular Heart Beat   • Chest Pain   :     HPI:     Mr. Moura is 69 y.o. and presents today for evaluation of chest pain.    He has a history of bladder cancer s/p resection and [place] pouch. He does not have a history of HTN. His last lipid panel showed an HDL of 52, , . His last A1C was 6.5%; he was started on metformin. His father had premature CAD. He had a CT in 2024 that reported coronary artery calcification, but did not quantify them. I cannot see those images as they were performed at [facility].    He is not terribly physically active. He does report fatigue and dyspnea with even mild exertion. Six weeks ago, shortly after waking up, he developed intense substernal chest pressure that was non-radiating. He was very short of breath and panicked. He was very nauseated and actually threw up twice; his pain improved after vomiting. The pain lasted ~ one hour. He did not take any medications at home like aspirin. He called EMS and they evaluated him and did an EKG; they did not administer any medications either. Once his pain subsided, they told him (in his words), \"you're not having a " "heart attack, we could take you to the hospital, but they're just going to do some labs and send you home.\"  He had a similar episode of pain two weeks later, although it was shorter and nowhere near as severe. He has not had any pain since then.     Past Medical History:   Diagnosis Date   • Anxiety    • Depression    • Incarcerated ventral hernia 2019   • Malignant neoplasm of bladder 2014    Formatting of this note might be different from the original.  2015 IMO UPDATE     • Seizures    • Type 2 diabetes mellitus        Past Surgical History:   Procedure Laterality Date   • HERNIA REPAIR  2020 [facility]   • [place] POUCH      [place] [facility]       Social History     Socioeconomic History   • Marital status:    Tobacco Use   • Smoking status: Former     Current packs/day: 0.00     Types: Cigarettes     Quit date: 2010     Years since quittin.3     Passive exposure: Never   • Smokeless tobacco: Never   Vaping Use   • Vaping status: Never Used   Substance and Sexual Activity   • Alcohol use: Yes     Alcohol/week: 10.0 standard drinks of alcohol     Types: 10 Shots of liquor per week   • Drug use: Yes     Types: Marijuana     Comment: twice weekly   • Sexual activity: Yes     Partners: Female     Comment: Lives with wife, Shwetha       Family History   Problem Relation Age of Onset   • Arthritis Mother    • Lung cancer Father    • Heart disease Father    • Hypertension Father        Review of Systems   Constitutional: Positive for malaise/fatigue.   Cardiovascular:  Positive for chest pain and dyspnea on exertion.       No Known Allergies      Current Outpatient Medications:   •  buPROPion XL (WELLBUTRIN XL) 150 MG 24 hr tablet, Take 150 mg by mouth Daily. (Patient taking differently: Take 2 tablets by mouth Daily.), Disp: , Rfl:   •  diazePAM (VALIUM) 10 MG tablet, TAKE 1 TABLET BY MOUTH EVERY DAY AS NEEDED FOR MUSCLE SPASM, Disp: , Rfl: " "  •  metFORMIN (GLUCOPHAGE) 1000 MG tablet, Take 1 tablet by mouth Daily., Disp: , Rfl:   •  aspirin 81 MG EC tablet, Take 1 tablet by mouth Daily., Disp: , Rfl:   •  atenolol (TENORMIN) 25 MG tablet, Take 1 tablet by mouth Daily., Disp: 90 tablet, Rfl: 1      Objective:     Vitals:    03/28/25 1000   BP: 138/88   BP Location: Left arm   Pulse: 82   Weight: 108 kg (238 lb 6.4 oz)   Height: 177.8 cm (70\")     Body mass index is 34.21 kg/m².    Vitals reviewed.   Constitutional:       Appearance: Not in distress.      Comments: Obese   Eyes:      Conjunctiva/sclera: Conjunctivae normal.   HENT:      Head: Normocephalic.      Nose: Nose normal.   Neck:      Vascular: JVD normal.   Pulmonary:      Effort: Pulmonary effort is normal.      Breath sounds: Normal breath sounds.   Cardiovascular:      Normal rate. Regular rhythm.      Murmurs: There is no murmur.   Pulses:     Intact distal pulses.   Edema:     Peripheral edema absent.   Abdominal:      Palpations: Abdomen is soft.      Tenderness: There is no abdominal tenderness.      Comments: Obesity limits abdominal exam   Musculoskeletal: Normal range of motion.      Cervical back: Normal range of motion. Skin:     General: Skin is warm and dry.   Neurological:      Mental Status: Alert and oriented to person, place, and time.      Cranial Nerves: No cranial nerve deficit.   Psychiatric:         Behavior: Behavior normal.         Thought Content: Thought content normal.         Judgment: Judgment normal.           ECG 12 Lead    Date/Time: 3/28/2025 4:18 PM  Performed by: Kemal Guevara MD    Authorized by: Kemal Guevara MD  Comparison: not compared with previous ECG   Previous ECG: no previous ECG available  Rhythm: sinus rhythm  Rate: normal  Conduction: right bundle branch block and left anterior fascicular block  QRS axis: left  Other: no other findings    Clinical impression: abnormal EKG          Assessment:       Diagnosis Plan   1. Precordial pain  Stress Test " With PET Myocardial Perfusion    Adult Transthoracic Echo Complete W/ Cont if Necessary Per Protocol      2. Dyspnea on exertion  Stress Test With PET Myocardial Perfusion    Adult Transthoracic Echo Complete W/ Cont if Necessary Per Protocol      3. Bifascicular block  Stress Test With PET Myocardial Perfusion    Adult Transthoracic Echo Complete W/ Cont if Necessary Per Protocol      4. Type 2 diabetes mellitus without complication, without long-term current use of insulin        5. Family history of coronary arteriosclerosis          Plan:       Mr Moura is a 70yo man with several risk factors for CAD (age, male sex, family history, former cigarette smoking, hypertriglyceridemia, T2DM, known coronary artery calcification) who had an episode of chest discomfort several weeks ago followed by a milder episode that I am concerned was actually an infarct. While it may have been something innocuous like an esophageal spasm or an atypical representation of biliary colic, my primary concern lies with his heart.    He has a bifascicular block on EKG; I have no EKGs to review but I can find a old study in Epic that reported a LAFB. The RBBB is new since 2020 but I'm not sure when it developed. He denies any symptoms of advanced conduction disease.    I have recommended an echo and a perfusion stress test for starters. While we are completing his workup, I asked him to take low dose aspirin (81mg daily) and atenolol 25mg daily (as an anti-anginal and to help with his mildly elevated BP).       Sincerely,       Kemal Guevara MD                   no

## 2025-04-09 ENCOUNTER — APPOINTMENT (OUTPATIENT)
Dept: INTERNAL MEDICINE | Facility: CLINIC | Age: 75
End: 2025-04-09
Payer: MEDICARE

## 2025-04-09 VITALS — WEIGHT: 213 LBS | OXYGEN SATURATION: 96 % | HEIGHT: 71 IN | HEART RATE: 73 BPM | BODY MASS INDEX: 29.82 KG/M2

## 2025-04-09 VITALS — DIASTOLIC BLOOD PRESSURE: 76 MMHG | SYSTOLIC BLOOD PRESSURE: 118 MMHG

## 2025-04-09 DIAGNOSIS — E11.9 TYPE 2 DIABETES MELLITUS W/OUT COMPLICATIONS: ICD-10-CM

## 2025-04-09 DIAGNOSIS — S97.81XS CRUSHING INJURY OF RIGHT FOOT, SEQUELA: ICD-10-CM

## 2025-04-09 DIAGNOSIS — I25.10 ATHEROSCLEROTIC HEART DISEASE OF NATIVE CORONARY ARTERY W/OUT ANGINA PECTORIS: ICD-10-CM

## 2025-04-09 PROCEDURE — G2211 COMPLEX E/M VISIT ADD ON: CPT

## 2025-04-09 PROCEDURE — 99213 OFFICE O/P EST LOW 20 MIN: CPT

## 2025-04-09 RX ORDER — CELECOXIB 200 MG/1
200 CAPSULE ORAL
Qty: 180 | Refills: 0 | Status: ACTIVE | COMMUNITY
Start: 2025-04-09 | End: 1900-01-01

## 2025-04-16 ENCOUNTER — RX RENEWAL (OUTPATIENT)
Age: 75
End: 2025-04-16

## 2025-04-18 ENCOUNTER — APPOINTMENT (OUTPATIENT)
Dept: ELECTROPHYSIOLOGY | Facility: CLINIC | Age: 75
End: 2025-04-18
Payer: MEDICARE

## 2025-04-18 ENCOUNTER — NON-APPOINTMENT (OUTPATIENT)
Age: 75
End: 2025-04-18

## 2025-04-18 PROCEDURE — 93298 REM INTERROG DEV EVAL SCRMS: CPT

## 2025-05-23 ENCOUNTER — NON-APPOINTMENT (OUTPATIENT)
Age: 75
End: 2025-05-23

## 2025-05-23 ENCOUNTER — APPOINTMENT (OUTPATIENT)
Dept: ELECTROPHYSIOLOGY | Facility: CLINIC | Age: 75
End: 2025-05-23

## 2025-05-23 PROCEDURE — 93298 REM INTERROG DEV EVAL SCRMS: CPT

## 2025-05-27 ENCOUNTER — RX RENEWAL (OUTPATIENT)
Age: 75
End: 2025-05-27

## 2025-05-27 ENCOUNTER — APPOINTMENT (OUTPATIENT)
Dept: CARDIOLOGY | Facility: CLINIC | Age: 75
End: 2025-05-27

## 2025-06-05 ENCOUNTER — APPOINTMENT (OUTPATIENT)
Dept: CARDIOLOGY | Facility: CLINIC | Age: 75
End: 2025-06-05

## 2025-06-10 ENCOUNTER — NON-APPOINTMENT (OUTPATIENT)
Age: 75
End: 2025-06-10

## 2025-06-10 ENCOUNTER — APPOINTMENT (OUTPATIENT)
Dept: ELECTROPHYSIOLOGY | Facility: CLINIC | Age: 75
End: 2025-06-10
Payer: MEDICARE

## 2025-06-10 VITALS
SYSTOLIC BLOOD PRESSURE: 114 MMHG | BODY MASS INDEX: 28.98 KG/M2 | OXYGEN SATURATION: 98 % | RESPIRATION RATE: 16 BRPM | DIASTOLIC BLOOD PRESSURE: 72 MMHG | HEART RATE: 66 BPM | HEIGHT: 71 IN | WEIGHT: 207 LBS

## 2025-06-10 PROCEDURE — 99204 OFFICE O/P NEW MOD 45 MIN: CPT

## 2025-06-10 PROCEDURE — 93285 PRGRMG DEV EVAL SCRMS IP: CPT

## 2025-06-10 RX ORDER — ASPIRIN 81 MG/1
81 TABLET, COATED ORAL
Refills: 0 | Status: ACTIVE | COMMUNITY

## 2025-06-11 PROBLEM — R42 DIZZINESS: Status: ACTIVE | Noted: 2025-06-11

## 2025-06-25 ENCOUNTER — APPOINTMENT (OUTPATIENT)
Dept: CARDIOLOGY | Facility: CLINIC | Age: 75
End: 2025-06-25
Payer: MEDICARE

## 2025-06-25 VITALS
SYSTOLIC BLOOD PRESSURE: 118 MMHG | DIASTOLIC BLOOD PRESSURE: 64 MMHG | HEART RATE: 65 BPM | OXYGEN SATURATION: 98 % | BODY MASS INDEX: 29.4 KG/M2 | WEIGHT: 210 LBS | HEIGHT: 71 IN

## 2025-06-25 PROCEDURE — 93000 ELECTROCARDIOGRAM COMPLETE: CPT

## 2025-06-25 PROCEDURE — 99214 OFFICE O/P EST MOD 30 MIN: CPT | Mod: 25

## 2025-06-26 ENCOUNTER — NON-APPOINTMENT (OUTPATIENT)
Age: 75
End: 2025-06-26

## 2025-07-01 ENCOUNTER — RX RENEWAL (OUTPATIENT)
Age: 75
End: 2025-07-01

## 2025-07-09 ENCOUNTER — APPOINTMENT (OUTPATIENT)
Dept: INTERNAL MEDICINE | Facility: CLINIC | Age: 75
End: 2025-07-09

## 2025-07-09 NOTE — ASSESSMENT
[FreeTextEntry1] : This is a 71 year old man with paroxysmal atrial tachycardia s/p ablation. ILR demonstrates no tachycardia since procedure. \par \par 1) SVT : s/p successful ablation.  \par 2) Hypertension: BP Controlled on Metoprolol 50 mg  and Lisinopril 10 mg BID will continue at this dose. \par 3) pre-sncope: no recurrent symptoms since ablation. \par \par  
Statement Selected

## 2025-07-11 ENCOUNTER — APPOINTMENT (OUTPATIENT)
Dept: ELECTROPHYSIOLOGY | Facility: CLINIC | Age: 75
End: 2025-07-11
Payer: MEDICARE

## 2025-07-11 ENCOUNTER — NON-APPOINTMENT (OUTPATIENT)
Age: 75
End: 2025-07-11

## 2025-07-11 PROCEDURE — 93298 REM INTERROG DEV EVAL SCRMS: CPT

## 2025-07-21 ENCOUNTER — APPOINTMENT (OUTPATIENT)
Dept: ELECTROPHYSIOLOGY | Facility: CLINIC | Age: 75
End: 2025-07-21
Payer: MEDICARE

## 2025-07-21 VITALS
WEIGHT: 210 LBS | BODY MASS INDEX: 29.4 KG/M2 | DIASTOLIC BLOOD PRESSURE: 70 MMHG | HEART RATE: 65 BPM | HEIGHT: 71 IN | SYSTOLIC BLOOD PRESSURE: 107 MMHG | OXYGEN SATURATION: 96 %

## 2025-07-21 DIAGNOSIS — I48.0 PAROXYSMAL ATRIAL FIBRILLATION: ICD-10-CM

## 2025-07-21 PROCEDURE — 99214 OFFICE O/P EST MOD 30 MIN: CPT | Mod: 25

## 2025-07-21 PROCEDURE — 93000 ELECTROCARDIOGRAM COMPLETE: CPT

## 2025-07-31 ENCOUNTER — RX RENEWAL (OUTPATIENT)
Age: 75
End: 2025-07-31

## 2025-08-20 ENCOUNTER — APPOINTMENT (OUTPATIENT)
Dept: CT IMAGING | Facility: CLINIC | Age: 75
End: 2025-08-20
Payer: MEDICARE

## 2025-08-20 PROCEDURE — 75572 CT HRT W/3D IMAGE: CPT

## 2025-08-25 ENCOUNTER — APPOINTMENT (OUTPATIENT)
Dept: ELECTROPHYSIOLOGY | Facility: CLINIC | Age: 75
End: 2025-08-25
Payer: MEDICARE

## 2025-08-25 ENCOUNTER — NON-APPOINTMENT (OUTPATIENT)
Age: 75
End: 2025-08-25

## 2025-08-25 PROCEDURE — 93298 REM INTERROG DEV EVAL SCRMS: CPT

## 2025-09-25 PROBLEM — I35.2 AORTIC VALVE STENOSIS WITH INSUFFICIENCY, UNSPECIFIED ETIOLOGY: Status: ACTIVE | Noted: 2017-02-03
